# Patient Record
Sex: FEMALE | Race: WHITE | Employment: OTHER | URBAN - METROPOLITAN AREA
[De-identification: names, ages, dates, MRNs, and addresses within clinical notes are randomized per-mention and may not be internally consistent; named-entity substitution may affect disease eponyms.]

---

## 2017-06-07 PROBLEM — G45.1 TIA INVOLVING LEFT INTERNAL CAROTID ARTERY: Status: ACTIVE | Noted: 2017-06-07

## 2017-06-07 PROBLEM — R55 NEAR SYNCOPE: Status: ACTIVE | Noted: 2017-06-07

## 2019-02-12 ENCOUNTER — HOSPITAL ENCOUNTER (EMERGENCY)
Age: 44
Discharge: HOME | End: 2019-02-12
Payer: OTHER GOVERNMENT

## 2019-02-12 VITALS
RESPIRATION RATE: 16 BRPM | BODY MASS INDEX: 53.92 KG/M2 | HEART RATE: 90 BPM | WEIGHT: 293 LBS | SYSTOLIC BLOOD PRESSURE: 163 MMHG | DIASTOLIC BLOOD PRESSURE: 97 MMHG | TEMPERATURE: 99.2 F | OXYGEN SATURATION: 96 % | HEIGHT: 62 IN

## 2019-02-12 DIAGNOSIS — M23.91 INTERNAL DERANGEMENT OF RIGHT KNEE: Primary | ICD-10-CM

## 2019-02-12 DIAGNOSIS — M25.561 ACUTE PAIN OF RIGHT KNEE: ICD-10-CM

## 2019-02-12 PROCEDURE — 6370000000 HC RX 637 (ALT 250 FOR IP): Performed by: NURSE PRACTITIONER

## 2019-02-12 PROCEDURE — 99283 EMERGENCY DEPT VISIT LOW MDM: CPT

## 2019-02-12 RX ORDER — ONDANSETRON 4 MG/1
4 TABLET, ORALLY DISINTEGRATING ORAL ONCE
Status: COMPLETED | OUTPATIENT
Start: 2019-02-12 | End: 2019-02-12

## 2019-02-12 RX ORDER — HYDROCODONE BITARTRATE AND ACETAMINOPHEN 5; 325 MG/1; MG/1
1 TABLET ORAL ONCE
Status: COMPLETED | OUTPATIENT
Start: 2019-02-12 | End: 2019-02-12

## 2019-02-12 RX ADMIN — HYDROCODONE BITARTRATE AND ACETAMINOPHEN 1 TABLET: 5; 325 TABLET ORAL at 22:45

## 2019-02-12 RX ADMIN — ONDANSETRON 4 MG: 4 TABLET, ORALLY DISINTEGRATING ORAL at 22:45

## 2019-02-12 ASSESSMENT — PAIN SCALES - GENERAL
PAINLEVEL_OUTOF10: 8
PAINLEVEL_OUTOF10: 8

## 2019-10-02 ENCOUNTER — HOSPITAL ENCOUNTER (OUTPATIENT)
Age: 44
Discharge: HOME | End: 2019-10-04
Payer: OTHER GOVERNMENT

## 2019-10-02 PROCEDURE — G0123 SCREEN CERV/VAG THIN LAYER: HCPCS

## 2019-10-02 PROCEDURE — 87624 HPV HI-RISK TYP POOLED RSLT: CPT

## 2019-10-08 LAB
HPV SAMPLE: NORMAL
HPV TYPE 16: NOT DETECTED
HPV TYPE 18: NOT DETECTED
HPV, HIGH RISK OTHER: NOT DETECTED
INTERPRETATION: NORMAL
SOURCE: NORMAL

## 2020-07-24 ENCOUNTER — HOSPITAL ENCOUNTER (EMERGENCY)
Age: 45
Discharge: HOME | End: 2020-07-24
Attending: STUDENT IN AN ORGANIZED HEALTH CARE EDUCATION/TRAINING PROGRAM
Payer: OTHER GOVERNMENT

## 2020-07-24 VITALS
WEIGHT: 293 LBS | HEIGHT: 62 IN | RESPIRATION RATE: 18 BRPM | OXYGEN SATURATION: 95 % | BODY MASS INDEX: 53.92 KG/M2 | DIASTOLIC BLOOD PRESSURE: 65 MMHG | SYSTOLIC BLOOD PRESSURE: 142 MMHG | TEMPERATURE: 96.9 F | HEART RATE: 76 BPM

## 2020-07-24 PROCEDURE — 96374 THER/PROPH/DIAG INJ IV PUSH: CPT

## 2020-07-24 PROCEDURE — 6360000002 HC RX W HCPCS: Performed by: STUDENT IN AN ORGANIZED HEALTH CARE EDUCATION/TRAINING PROGRAM

## 2020-07-24 PROCEDURE — 2580000003 HC RX 258: Performed by: STUDENT IN AN ORGANIZED HEALTH CARE EDUCATION/TRAINING PROGRAM

## 2020-07-24 PROCEDURE — 96375 TX/PRO/DX INJ NEW DRUG ADDON: CPT

## 2020-07-24 PROCEDURE — 6370000000 HC RX 637 (ALT 250 FOR IP): Performed by: STUDENT IN AN ORGANIZED HEALTH CARE EDUCATION/TRAINING PROGRAM

## 2020-07-24 PROCEDURE — 99283 EMERGENCY DEPT VISIT LOW MDM: CPT

## 2020-07-24 RX ORDER — ACETAMINOPHEN 325 MG/1
650 TABLET ORAL ONCE
Status: COMPLETED | OUTPATIENT
Start: 2020-07-24 | End: 2020-07-24

## 2020-07-24 RX ORDER — METOCLOPRAMIDE 10 MG/1
10 TABLET ORAL 4 TIMES DAILY
Qty: 20 TABLET | Refills: 0 | Status: SHIPPED | OUTPATIENT
Start: 2020-07-24 | End: 2020-09-15 | Stop reason: ALTCHOICE

## 2020-07-24 RX ORDER — METHYLPREDNISOLONE SODIUM SUCCINATE 125 MG/2ML
80 INJECTION, POWDER, LYOPHILIZED, FOR SOLUTION INTRAMUSCULAR; INTRAVENOUS ONCE
Status: COMPLETED | OUTPATIENT
Start: 2020-07-24 | End: 2020-07-24

## 2020-07-24 RX ORDER — METOCLOPRAMIDE HYDROCHLORIDE 5 MG/ML
10 INJECTION INTRAMUSCULAR; INTRAVENOUS ONCE
Status: COMPLETED | OUTPATIENT
Start: 2020-07-24 | End: 2020-07-24

## 2020-07-24 RX ORDER — 0.9 % SODIUM CHLORIDE 0.9 %
1000 INTRAVENOUS SOLUTION INTRAVENOUS ONCE
Status: COMPLETED | OUTPATIENT
Start: 2020-07-24 | End: 2020-07-24

## 2020-07-24 RX ADMIN — ACETAMINOPHEN 650 MG: 325 TABLET, FILM COATED ORAL at 16:31

## 2020-07-24 RX ADMIN — METOCLOPRAMIDE HYDROCHLORIDE 10 MG: 5 INJECTION INTRAMUSCULAR; INTRAVENOUS at 16:31

## 2020-07-24 RX ADMIN — METHYLPREDNISOLONE SODIUM SUCCINATE 80 MG: 125 INJECTION, POWDER, FOR SOLUTION INTRAMUSCULAR; INTRAVENOUS at 15:42

## 2020-07-24 RX ADMIN — SODIUM CHLORIDE 1000 ML: 9 INJECTION, SOLUTION INTRAVENOUS at 15:41

## 2020-07-24 ASSESSMENT — PAIN SCALES - GENERAL: PAINLEVEL_OUTOF10: 4

## 2020-07-24 NOTE — ED PROVIDER NOTES
HPI:  7/24/20, Time: 3:28 PM EDT         Boby Holguin is a 50 y.o. female presenting to the ED for allergic reaction, beginning earlier today. The complaint has been intermittent, mild in severity, and worsened by nothing. Patient states she recently started Lyrica for fibromyalgia and back pain. This medicine was started on Wednesday. States that today she was at [facility] when she started to have a sensation of her throat closing and some shortness of breath. She became very concerned. She talked to the pharmacist and they recommended she come the emergency department for evaluation. She tried to call her doctor but they closed their office. States that symptoms are passing now that she is in the emergency department. She denies any fevers, chest pain. States that she did have an episode of emesis. She denies any nausea at this time. Patient has no abdominal pain at this time. Vitals are within normal limits. She does not have any other concerns at this time. Patient states that she is allergic to Benadryl and Pepcid. She tried to take an Allegra at the pharmacy but she threw it up. No other complaints. Patient denies fever/chills, cough, congestion, chest pain, shortness of breath, edema, headache, visual disturbances, focal paresthesias, focal weakness, abdominal pain, nausea, vomiting, diarrhea, constipation, dysuria, hematuria, trauma, neck or back pain or other complaints. ROS:   Pertinent positives and negatives are stated within HPI, all other systems reviewed and are negative.      --------------------------------------------- PAST HISTORY ---------------------------------------------  Past Medical History:  has a past medical history of Asthma, Cervical stenosis (uterine cervix), Depression, Fibromyalgia, High cholesterol, Irritable bowel, Nausea & vomiting, Prediabetes, and Spinal stenosis.     Past Surgical History:  has a past surgical history that includes Hysterectomy; laparoscopy; Dilatation, esophagus; and Colonoscopy. Social History:  reports that she has never smoked. She has never used smokeless tobacco. She reports that she does not drink alcohol or use drugs. Family History: family history includes Diabetes in her father; Heart Disease in her mother; High Blood Pressure in her mother. The patients home medications have been reviewed. Allergies: Aspirin; Benadryl [diphenhydramine hcl]; Codeine; Demerol; Famotidine; and Phenergan [promethazine hcl]        ---------------------------------------------------PHYSICAL EXAM--------------------------------------    Constitutional:  Well developed, well nourished, no acute distress, non-toxic appearance   Eyes:  PERRL, conjunctiva normal, EOMI  HENT:  Atraumatic, external ears normal, nose normal, oropharynx moist. Neck- normal range of motion, no tenderness, supple   Respiratory:  No respiratory distress, normal breath sounds, no rales, no wheezing   Cardiovascular:  Normal rate, normal rhythm, no murmurs, no gallops, no rubs. Radial and DP pulses 2+ bilaterally. GI:  Soft, nondistended, normal bowel sounds, nontender, no organomegaly, no mass, no rebound, no guarding   :  No costovertebral angle tenderness   Musculoskeletal:  No edema, no tenderness, no deformities. Back- no tenderness  Integument:  Well hydrated, no rash. Adequate perfusion. Lymphatic:  No lymphadenopathy noted   Neurologic:  Alert & oriented x 3, CN 2-12 normal, normal motor function, normal sensory function, no focal deficits noted. Psychiatric:  Speech and behavior appropriate       -------------------------------------------------- RESULTS -------------------------------------------------  I have personally reviewed all laboratory and imaging results for this patient. Results are listed below. LABS:  No results found for this visit on 07/24/20.     RADIOLOGY:  Interpreted by Radiologist.  No orders to display ------------------------- NURSING NOTES AND VITALS REVIEWED ---------------------------   The nursing notes within the ED encounter and vital signs as below have been reviewed by myself. BP (!) 142/65   Pulse 76   Temp 96.9 °F (36.1 °C) (Infrared)   Resp 18   Ht 5' 2\" (1.575 m)   Wt 299 lb (135.6 kg)   LMP  (LMP Unknown)   SpO2 95%   BMI 54.69 kg/m²   Oxygen Saturation Interpretation: Normal    The patients available past medical records and past encounters were reviewed. ------------------------------ ED COURSE/MEDICAL DECISION MAKING----------------------  Medications   methylPREDNISolone sodium (SOLU-MEDROL) injection 80 mg (80 mg Intravenous Given 7/24/20 1542)   0.9 % sodium chloride bolus (0 mLs Intravenous Stopped 7/24/20 1734)   metoclopramide (REGLAN) injection 10 mg (10 mg Intravenous Given 7/24/20 1631)   acetaminophen (TYLENOL) tablet 650 mg (650 mg Oral Given 7/24/20 1631)         Medical Decision Making:    Patient was observed for several hours in emergency department. She was given steroids. Patient was unable to have any other antiallergy medications due to her allergies. Did not give her Benadryl or Pepcid. Patient did have some nausea while in the department. She was given some fluids as well as Reglan for her symptoms. She states that she had no further concerns for shortness of breath or that her throat was bothering her. States that she felt back to 100% and normal.  Provided her prescription for continued steroid use. Instructed her to follow-up with her primary care physician for reassessment and to return the emergency department symptoms worsen. Also instructed her to not use the medication Lyrica until cleared by her primary care physician. Patient agreed with this plan and was discharged home.     This patient's ED course included: a personal history and physicial examination, re-evaluation prior to disposition, multiple bedside re-evaluations, IV

## 2020-09-08 ENCOUNTER — INITIAL CONSULT (OUTPATIENT)
Dept: NEUROSURGERY | Age: 45
End: 2020-09-08
Payer: OTHER GOVERNMENT

## 2020-09-08 VITALS
DIASTOLIC BLOOD PRESSURE: 93 MMHG | SYSTOLIC BLOOD PRESSURE: 143 MMHG | WEIGHT: 293 LBS | HEART RATE: 86 BPM | HEIGHT: 62 IN | BODY MASS INDEX: 53.92 KG/M2

## 2020-09-08 PROCEDURE — 99203 OFFICE O/P NEW LOW 30 MIN: CPT | Performed by: PHYSICIAN ASSISTANT

## 2020-09-08 ASSESSMENT — ENCOUNTER SYMPTOMS
GASTROINTESTINAL NEGATIVE: 1
RESPIRATORY NEGATIVE: 1
ALLERGIC/IMMUNOLOGIC NEGATIVE: 1
BACK PAIN: 1
EYES NEGATIVE: 1

## 2020-09-08 NOTE — PROGRESS NOTES
Subjective:      Patient ID: Rhonda Thao is a 50 y.o. female. Back Pain   This is a chronic problem. Episode onset: over 25 years. The problem occurs constantly. The problem has been gradually worsening since onset. The pain is present in the lumbar spine (and bilateral leg pain ). The quality of the pain is described as aching. The pain is at a severity of 10/10. The symptoms are aggravated by twisting, standing and bending. Risk factors: fibromyalgia. Treatments tried: gabapentin, motrin, PT. The treatment provided mild relief. Review of Systems   Constitutional: Negative. HENT: Negative. Eyes: Negative. Respiratory: Negative. Cardiovascular: Negative. Gastrointestinal: Negative. Endocrine: Negative. Genitourinary: Negative. Musculoskeletal: Positive for back pain. Skin: Negative. Allergic/Immunologic: Negative. Neurological: Negative. Hematological: Negative. Psychiatric/Behavioral: Negative. Objective:   Physical Exam  Constitutional:       Appearance: Normal appearance. HENT:      Head: Normocephalic and atraumatic. Nose: Nose normal.   Eyes:      Pupils: Pupils are equal, round, and reactive to light. Pulmonary:      Effort: Pulmonary effort is normal.   Abdominal:      General: There is no distension. Musculoskeletal:      Comments: Pain with ROM and palpation of the lumbar spine. Skin:     General: Skin is warm and dry. Neurological:      Mental Status: She is alert. GCS: GCS eye subscore is 4. GCS verbal subscore is 5. GCS motor subscore is 6. Sensory: Sensation is intact. Motor: Motor function is intact. Gait: Gait is intact. Deep Tendon Reflexes: Babinski sign absent on the right side. Babinski sign absent on the left side. Reflex Scores:       Tricep reflexes are 3+ on the right side and 3+ on the left side. Bicep reflexes are 3+ on the right side and 3+ on the left side.        Brachioradialis reflexes are 3+ on the right side and 3+ on the left side. Patellar reflexes are 3+ on the right side and 3+ on the left side. Achilles reflexes are 3+ on the right side and 3+ on the left side. Comments: + hoffmans sign    Psychiatric:         Mood and Affect: Mood normal.         Assessment:      50year old female with low back and bilateral leg pain for over 25 years. She does have a history of fibromyalgia. She does have a myelopathic history and exam.      Plan: We will order lumbar and cervical MRI.         JOE Curiel

## 2020-09-15 ENCOUNTER — HOSPITAL ENCOUNTER (OUTPATIENT)
Age: 45
Discharge: HOME | End: 2020-09-17
Payer: OTHER GOVERNMENT

## 2020-09-15 PROCEDURE — U0003 INFECTIOUS AGENT DETECTION BY NUCLEIC ACID (DNA OR RNA); SEVERE ACUTE RESPIRATORY SYNDROME CORONAVIRUS 2 (SARS-COV-2) (CORONAVIRUS DISEASE [COVID-19]), AMPLIFIED PROBE TECHNIQUE, MAKING USE OF HIGH THROUGHPUT TECHNOLOGIES AS DESCRIBED BY CMS-2020-01-R: HCPCS

## 2020-09-15 RX ORDER — COVID-19 ANTIGEN TEST
KIT MISCELLANEOUS
Status: ON HOLD | COMMUNITY
End: 2020-09-18 | Stop reason: HOSPADM

## 2020-09-15 RX ORDER — ALBUTEROL SULFATE 90 UG/1
2 AEROSOL, METERED RESPIRATORY (INHALATION) EVERY 6 HOURS PRN
COMMUNITY
End: 2022-06-23

## 2020-09-15 RX ORDER — DOCUSATE SODIUM 100 MG/1
100 CAPSULE, LIQUID FILLED ORAL DAILY
COMMUNITY

## 2020-09-15 RX ORDER — M-VIT,TX,IRON,MINS/CALC/FOLIC 27MG-0.4MG
1 TABLET ORAL DAILY
COMMUNITY
End: 2022-06-23

## 2020-09-15 NOTE — PROGRESS NOTES
Have you been tested for COVID  Yes    Tested on 9-15-20 for OR scheduled 9-18-20        Have you been told you were positive for COVID No  Have you had any known exposure to someone that is positive for COVID No  Do you have a cough                   No              Do you have shortness of breath No                 Do you have a sore throat            No                Are you having chills                    No                Are you having muscle aches. No                    Please come to the hospital wearing a mask and have your significant other wear a mask as well. Both of you should check your temperature before leaving to come here,  if it is 100 or higher please call [phone] for instruction. [facility] PRE-ADMISSION TESTING INSTRUCTIONS    The Preadmission Testing patient is instructed accordingly using the following criteria (check applicable):    ARRIVAL INSTRUCTIONS:  [x] Parking the day of Surgery is located in the Main Entrance lot. Upon entering the door, make an immediate right to the surgery reception desk    [] [id number] is available Monday through Friday 6 am to 6 pm    [x] Bring photo ID and insurance card    [] Bring in a copy of Living will or Durable Power of  papers.     [x] Please be sure to arrange for responsible adult to provide transportation to and from the hospital    [x] Please arrange for responsible adult to be with you for the 24 hour period post procedure due to having anesthesia      GENERAL INSTRUCTIONS:    [x] Nothing by mouth after midnight, including gum, candy, mints or water    [x] You may brush your teeth, but do not swallow any water    [x] Take medications as instructed with 1-2 oz of water    [x] Stop herbal supplements and vitamins 5 days prior to procedure    [x] Follow preop dosing of blood thinners per physician instructions    [] Take 1/2 dose of evening insulin, but no insulin after midnight    [] No oral diabetic medications after midnight    [] If diabetic and have low blood sugar or feel symptomatic, take 1-2oz apple juice only    [] Bring inhalers day of surgery    [] Bring C-PAP/ Bi-Pap day of surgery    [] Bring urine specimen day of surgery    [x] Shower or bath with soap, lather and rinse well, AM of Surgery, no lotion, powders or creams to surgical site    [] Follow bowel prep as instructed per surgeon    [x] No tobacco products within 24 hours of surgery     [x] No alcohol or illegal drug use within 24 hours of surgery.     [x] Jewelry, body piercing's, eyeglasses, contact lenses and dentures are not permitted into surgery (bring cases)      [x] Please do not wear any nail polish, make up or hair products on the day of surgery    [x] If not already done, you can expect a call from registration    [x] You can expect a call the business day prior to procedure to notify you if your arrival time changes    [x] If you receive a survey after surgery we would greatly appreciate your comments    [] Parent/guardian of a minor must accompany their child and remain on the premises  the entire time they are under our care     [] Pediatric patients may bring favorite toy, blanket or comfort item with them    [] A caregiver or family member must remain with the patient during their stay if they are mentally handicapped, have dementia, disoriented or unable to use a call light or would be a safety concern if left unattended    [x] Please notify surgeon if you develop any illness between now and time of surgery (cold, cough, sore throat, fever, nausea, vomiting) or any signs of infections  including skin, wounds, and dental.    [x]  The Outpatient Pharmacy is available to fill your prescription here on your day of surgery, ask your preop nurse for details    [x] Other instructions  EDUCATIONAL MATERIALS PROVIDED:    [] PAT Preoperative Education Packet/Booklet     [] Medication List    [] Fluoroscopy

## 2020-09-17 LAB
SARS-COV-2: NOT DETECTED
SOURCE: NORMAL

## 2020-09-18 ENCOUNTER — ANESTHESIA EVENT (OUTPATIENT)
Dept: OPERATING ROOM | Age: 45
End: 2020-09-18
Payer: OTHER GOVERNMENT

## 2020-09-18 ENCOUNTER — HOSPITAL ENCOUNTER (OUTPATIENT)
Age: 45
Setting detail: OUTPATIENT SURGERY
Discharge: HOME | End: 2020-09-18
Attending: OBSTETRICS & GYNECOLOGY | Admitting: OBSTETRICS & GYNECOLOGY
Payer: OTHER GOVERNMENT

## 2020-09-18 ENCOUNTER — ANESTHESIA (OUTPATIENT)
Dept: OPERATING ROOM | Age: 45
End: 2020-09-18
Payer: OTHER GOVERNMENT

## 2020-09-18 VITALS
RESPIRATION RATE: 6 BRPM | OXYGEN SATURATION: 95 % | DIASTOLIC BLOOD PRESSURE: 87 MMHG | SYSTOLIC BLOOD PRESSURE: 195 MMHG | TEMPERATURE: 73.2 F

## 2020-09-18 VITALS
TEMPERATURE: 98.2 F | OXYGEN SATURATION: 94 % | SYSTOLIC BLOOD PRESSURE: 135 MMHG | HEIGHT: 62 IN | RESPIRATION RATE: 14 BRPM | HEART RATE: 76 BPM | WEIGHT: 293 LBS | BODY MASS INDEX: 53.92 KG/M2 | DIASTOLIC BLOOD PRESSURE: 78 MMHG

## 2020-09-18 PROCEDURE — 2500000003 HC RX 250 WO HCPCS: Performed by: NURSE ANESTHETIST, CERTIFIED REGISTERED

## 2020-09-18 PROCEDURE — 2580000003 HC RX 258: Performed by: OBSTETRICS & GYNECOLOGY

## 2020-09-18 PROCEDURE — 7100000001 HC PACU RECOVERY - ADDTL 15 MIN: Performed by: OBSTETRICS & GYNECOLOGY

## 2020-09-18 PROCEDURE — 6360000002 HC RX W HCPCS: Performed by: NURSE ANESTHETIST, CERTIFIED REGISTERED

## 2020-09-18 PROCEDURE — 6360000002 HC RX W HCPCS: Performed by: OBSTETRICS & GYNECOLOGY

## 2020-09-18 PROCEDURE — 3700000001 HC ADD 15 MINUTES (ANESTHESIA): Performed by: OBSTETRICS & GYNECOLOGY

## 2020-09-18 PROCEDURE — 7100000000 HC PACU RECOVERY - FIRST 15 MIN: Performed by: OBSTETRICS & GYNECOLOGY

## 2020-09-18 PROCEDURE — 7100000010 HC PHASE II RECOVERY - FIRST 15 MIN: Performed by: OBSTETRICS & GYNECOLOGY

## 2020-09-18 PROCEDURE — 3600000002 HC SURGERY LEVEL 2 BASE: Performed by: OBSTETRICS & GYNECOLOGY

## 2020-09-18 PROCEDURE — 3600000012 HC SURGERY LEVEL 2 ADDTL 15MIN: Performed by: OBSTETRICS & GYNECOLOGY

## 2020-09-18 PROCEDURE — 7100000011 HC PHASE II RECOVERY - ADDTL 15 MIN: Performed by: OBSTETRICS & GYNECOLOGY

## 2020-09-18 PROCEDURE — 2580000003 HC RX 258: Performed by: NURSE ANESTHETIST, CERTIFIED REGISTERED

## 2020-09-18 PROCEDURE — 3700000000 HC ANESTHESIA ATTENDED CARE: Performed by: OBSTETRICS & GYNECOLOGY

## 2020-09-18 PROCEDURE — 2709999900 HC NON-CHARGEABLE SUPPLY: Performed by: OBSTETRICS & GYNECOLOGY

## 2020-09-18 RX ORDER — SODIUM CHLORIDE 0.9 % (FLUSH) 0.9 %
SYRINGE (ML) INJECTION PRN
Status: DISCONTINUED | OUTPATIENT
Start: 2020-09-18 | End: 2020-09-18 | Stop reason: ALTCHOICE

## 2020-09-18 RX ORDER — PROMETHAZINE HYDROCHLORIDE 25 MG/ML
6.25 INJECTION, SOLUTION INTRAMUSCULAR; INTRAVENOUS
Status: DISCONTINUED | OUTPATIENT
Start: 2020-09-18 | End: 2020-09-18 | Stop reason: HOSPADM

## 2020-09-18 RX ORDER — FENTANYL CITRATE 50 UG/ML
50 INJECTION, SOLUTION INTRAMUSCULAR; INTRAVENOUS EVERY 5 MIN PRN
Status: DISCONTINUED | OUTPATIENT
Start: 2020-09-18 | End: 2020-09-18 | Stop reason: HOSPADM

## 2020-09-18 RX ORDER — FENTANYL CITRATE 50 UG/ML
INJECTION, SOLUTION INTRAMUSCULAR; INTRAVENOUS PRN
Status: DISCONTINUED | OUTPATIENT
Start: 2020-09-18 | End: 2020-09-18 | Stop reason: SDUPTHER

## 2020-09-18 RX ORDER — SODIUM CHLORIDE, SODIUM LACTATE, POTASSIUM CHLORIDE, CALCIUM CHLORIDE 600; 310; 30; 20 MG/100ML; MG/100ML; MG/100ML; MG/100ML
INJECTION, SOLUTION INTRAVENOUS CONTINUOUS PRN
Status: DISCONTINUED | OUTPATIENT
Start: 2020-09-18 | End: 2020-09-18 | Stop reason: SDUPTHER

## 2020-09-18 RX ORDER — ONDANSETRON 2 MG/ML
INJECTION INTRAMUSCULAR; INTRAVENOUS PRN
Status: DISCONTINUED | OUTPATIENT
Start: 2020-09-18 | End: 2020-09-18 | Stop reason: SDUPTHER

## 2020-09-18 RX ORDER — DEXAMETHASONE SODIUM PHOSPHATE 4 MG/ML
INJECTION, SOLUTION INTRA-ARTICULAR; INTRALESIONAL; INTRAMUSCULAR; INTRAVENOUS; SOFT TISSUE PRN
Status: DISCONTINUED | OUTPATIENT
Start: 2020-09-18 | End: 2020-09-18 | Stop reason: SDUPTHER

## 2020-09-18 RX ORDER — LABETALOL HYDROCHLORIDE 5 MG/ML
INJECTION, SOLUTION INTRAVENOUS PRN
Status: DISCONTINUED | OUTPATIENT
Start: 2020-09-18 | End: 2020-09-18 | Stop reason: SDUPTHER

## 2020-09-18 RX ORDER — SODIUM CHLORIDE, SODIUM LACTATE, POTASSIUM CHLORIDE, CALCIUM CHLORIDE 600; 310; 30; 20 MG/100ML; MG/100ML; MG/100ML; MG/100ML
INJECTION, SOLUTION INTRAVENOUS CONTINUOUS
Status: DISCONTINUED | OUTPATIENT
Start: 2020-09-18 | End: 2020-09-18 | Stop reason: HOSPADM

## 2020-09-18 RX ORDER — ICOSAPENT ETHYL 1000 MG/1
1 CAPSULE ORAL DAILY
Status: ON HOLD | COMMUNITY
End: 2021-04-28 | Stop reason: HOSPADM

## 2020-09-18 RX ORDER — ROCURONIUM BROMIDE 10 MG/ML
INJECTION, SOLUTION INTRAVENOUS PRN
Status: DISCONTINUED | OUTPATIENT
Start: 2020-09-18 | End: 2020-09-18 | Stop reason: SDUPTHER

## 2020-09-18 RX ORDER — LIDOCAINE HYDROCHLORIDE 20 MG/ML
INJECTION, SOLUTION EPIDURAL; INFILTRATION; INTRACAUDAL; PERINEURAL PRN
Status: DISCONTINUED | OUTPATIENT
Start: 2020-09-18 | End: 2020-09-18 | Stop reason: SDUPTHER

## 2020-09-18 RX ORDER — MIDAZOLAM HYDROCHLORIDE 1 MG/ML
INJECTION INTRAMUSCULAR; INTRAVENOUS PRN
Status: DISCONTINUED | OUTPATIENT
Start: 2020-09-18 | End: 2020-09-18 | Stop reason: SDUPTHER

## 2020-09-18 RX ORDER — SUCCINYLCHOLINE/SOD CL,ISO/PF 200MG/10ML
SYRINGE (ML) INTRAVENOUS PRN
Status: DISCONTINUED | OUTPATIENT
Start: 2020-09-18 | End: 2020-09-18 | Stop reason: SDUPTHER

## 2020-09-18 RX ORDER — SODIUM CHLORIDE 0.9 % (FLUSH) 0.9 %
10 SYRINGE (ML) INJECTION EVERY 12 HOURS SCHEDULED
Status: DISCONTINUED | OUTPATIENT
Start: 2020-09-18 | End: 2020-09-18 | Stop reason: HOSPADM

## 2020-09-18 RX ORDER — PROPOFOL 10 MG/ML
INJECTION, EMULSION INTRAVENOUS PRN
Status: DISCONTINUED | OUTPATIENT
Start: 2020-09-18 | End: 2020-09-18 | Stop reason: SDUPTHER

## 2020-09-18 RX ORDER — SODIUM CHLORIDE 0.9 % (FLUSH) 0.9 %
10 SYRINGE (ML) INJECTION PRN
Status: DISCONTINUED | OUTPATIENT
Start: 2020-09-18 | End: 2020-09-18 | Stop reason: HOSPADM

## 2020-09-18 RX ADMIN — SODIUM CHLORIDE, POTASSIUM CHLORIDE, SODIUM LACTATE AND CALCIUM CHLORIDE: 600; 310; 30; 20 INJECTION, SOLUTION INTRAVENOUS at 12:06

## 2020-09-18 RX ADMIN — PROPOFOL 200 MG: 10 INJECTION, EMULSION INTRAVENOUS at 12:14

## 2020-09-18 RX ADMIN — ROCURONIUM BROMIDE 30 MG: 10 INJECTION, SOLUTION INTRAVENOUS at 12:19

## 2020-09-18 RX ADMIN — Medication 160 MG: at 12:14

## 2020-09-18 RX ADMIN — CEFAZOLIN 3 G: 10 INJECTION, POWDER, FOR SOLUTION INTRAVENOUS at 12:11

## 2020-09-18 RX ADMIN — MIDAZOLAM 2 MG: 1 INJECTION INTRAMUSCULAR; INTRAVENOUS at 12:08

## 2020-09-18 RX ADMIN — LIDOCAINE HYDROCHLORIDE 80 MG: 20 INJECTION, SOLUTION EPIDURAL; INFILTRATION; INTRACAUDAL; PERINEURAL at 12:14

## 2020-09-18 RX ADMIN — FENTANYL CITRATE 50 MCG: 50 INJECTION, SOLUTION INTRAMUSCULAR; INTRAVENOUS at 12:14

## 2020-09-18 RX ADMIN — DEXAMETHASONE SODIUM PHOSPHATE 10 MG: 4 INJECTION, SOLUTION INTRAMUSCULAR; INTRAVENOUS at 12:19

## 2020-09-18 RX ADMIN — SODIUM CHLORIDE, POTASSIUM CHLORIDE, SODIUM LACTATE AND CALCIUM CHLORIDE: 600; 310; 30; 20 INJECTION, SOLUTION INTRAVENOUS at 10:48

## 2020-09-18 RX ADMIN — ONDANSETRON 4 MG: 2 INJECTION INTRAMUSCULAR; INTRAVENOUS at 12:19

## 2020-09-18 RX ADMIN — SUGAMMADEX 500 MG: 100 INJECTION, SOLUTION INTRAVENOUS at 12:37

## 2020-09-18 RX ADMIN — LABETALOL HYDROCHLORIDE 5 MG: 5 INJECTION INTRAVENOUS at 12:33

## 2020-09-18 ASSESSMENT — PULMONARY FUNCTION TESTS
PIF_VALUE: 40
PIF_VALUE: 15
PIF_VALUE: 29
PIF_VALUE: 3
PIF_VALUE: 27
PIF_VALUE: 19
PIF_VALUE: 3
PIF_VALUE: 28
PIF_VALUE: 31
PIF_VALUE: 38
PIF_VALUE: 18
PIF_VALUE: 27
PIF_VALUE: 30
PIF_VALUE: 39
PIF_VALUE: 18
PIF_VALUE: 18
PIF_VALUE: 26
PIF_VALUE: 28
PIF_VALUE: 38
PIF_VALUE: 36
PIF_VALUE: 0
PIF_VALUE: 5
PIF_VALUE: 5
PIF_VALUE: 2
PIF_VALUE: 27
PIF_VALUE: 5
PIF_VALUE: 8
PIF_VALUE: 41
PIF_VALUE: 27
PIF_VALUE: 1
PIF_VALUE: 28
PIF_VALUE: 27
PIF_VALUE: 27
PIF_VALUE: 2

## 2020-09-18 ASSESSMENT — PAIN SCALES - GENERAL
PAINLEVEL_OUTOF10: 0

## 2020-09-18 ASSESSMENT — LIFESTYLE VARIABLES: SMOKING_STATUS: 0

## 2020-09-18 ASSESSMENT — ENCOUNTER SYMPTOMS: SHORTNESS OF BREATH: 0

## 2020-09-18 ASSESSMENT — PAIN - FUNCTIONAL ASSESSMENT: PAIN_FUNCTIONAL_ASSESSMENT: 0-10

## 2020-09-18 ASSESSMENT — PAIN DESCRIPTION - DESCRIPTORS: DESCRIPTORS: ACHING

## 2020-09-18 NOTE — H&P
Attends Anabaptist service: Not on file     Active member of club or organization: Not on file     Attends meetings of clubs or organizations: Not on file     Relationship status: Not on file    Intimate partner violence     Fear of current or ex partner: Not on file     Emotionally abused: Not on file     Physically abused: Not on file     Forced sexual activity: Not on file   Other Topics Concern    Not on file   Social History Narrative    Not on file     Family History:       Problem Relation Age of Onset    Heart Disease Mother     High Blood Pressure Mother     Diabetes Father      Medications Prior to Admission:  No medications prior to admission. REVIEW OF SYSTEMS:    CONSTITUTIONAL:  negative  RESPIRATORY:  negative  CARDIOVASCULAR:  negative  GASTROINTESTINAL:  negative  ALLERGIC/IMMUNOLOGIC:  negative  NEUROLOGICAL:  negative  BEHAVIOR/PSYCH:  negative       PHYSICAL EXAM:  Vitals:    09/15/20 1213   Weight: (!) 320 lb (145.2 kg)   Height: 5' 2\" (1.575 m)      Neuro:  Awake, alert, oriented to name, place and time. HEENT: NC/AT, no thyromegaly  Cardiac: RRR  Lungs:  CTA b/l  Abdomen:  Soft, non tender    Pelvic: No prolapse     ASSESSMENT:   Refractory over active bladder  Small bladder capacity  Urinary urge incontinence    PLAN:  Cystoscopy with bladder distention  Intra-detrusor Botox injections  Electronically signed by Neeraj Zhong MD on 9/18/2020 at 8:16 AM     Update History & Physical    The patient's History and Physical was reviewed with the patient and there were no significant changes. I examined the patient and there were no significant changes from the previous History and Physical.    Plan: The risk, benefits, expected outcome, and alternative to the recommended procedure have been discussed with the patient. Patient understands and wants to proceed with the procedure.     Electronically signed by Neeraj Zhong MD  on 9/18/2020 at 11:07 AM

## 2020-09-18 NOTE — ANESTHESIA PRE PROCEDURE
Department of Anesthesiology  Preprocedure Note       Name:  Michael Mas   Age:  50 y.o.  :  1975                                          MRN:  [id number]         Date:  2020      Surgeon: Hari Argueta): David Hashimoto, MD    Procedure: Procedure(s):  CYSTOSCOPY WITH BOTOX INJECTION OF BLADDER 100 UNITS, BLADDER DILATION  (PHARMACY NOTIFIED)++LATEX ALLERGY++    Medications prior to admission:   Prior to Admission medications    Medication Sig Start Date End Date Taking?  Authorizing Provider   Icosapent Ethyl (VASCEPA) 1 g CAPS capsule Take 1 capsule by mouth daily   Yes Historical Provider, MD   Multiple Vitamins-Minerals (THERAPEUTIC MULTIVITAMIN-MINERALS) tablet Take 1 tablet by mouth daily   Yes Historical Provider, MD   Naproxen Sodium (ALEVE) 220 MG CAPS Take by mouth LD 20   Yes Historical Provider, MD   albuterol sulfate HFA (VENTOLIN HFA) 108 (90 Base) MCG/ACT inhaler Inhale 2 puffs into the lungs every 6 hours as needed for Wheezing   Yes Historical Provider, MD   mirabegron (MYRBETRIQ) 25 MG TB24 Take 25 mg by mouth daily   Yes Historical Provider, MD   docusate sodium (COLACE) 100 MG capsule Take 100 mg by mouth 2 times daily   Yes Historical Provider, MD   ibuprofen (ADVIL;MOTRIN) 800 MG tablet Take 1 tablet by mouth every 8 hours as needed for Pain 17  Yes Greg Powell MD   DULoxetine (CYMBALTA) 30 MG extended release capsule Take 1 capsule by mouth daily  Patient taking differently: Take 90 mg by mouth daily  17  Yes Greg Powell MD   Cholecalciferol (VITAMIN D) 2000 UNITS CAPS capsule Take by mouth   Yes Historical Provider, MD   Levothyroxine Sodium (SYNTHROID PO) Take 0.05 mg by mouth daily Unsure of dose   Yes Historical Provider, MD   montelukast (SINGULAIR) 10 MG tablet Take 10 mg by mouth daily    Yes Historical Provider, MD       Current medications:    Current Facility-Administered Medications   Medication Dose Route Frequency Provider Last Rate Last Dose    Use    Smoking status: Never Smoker    Smokeless tobacco: Never Used   Substance Use Topics    Alcohol use: No                                Counseling given: Not Answered      Vital Signs (Current):   Vitals:    09/15/20 1213 09/18/20 1016   BP:  (!) 174/92   Pulse:  75   Resp:  20   Temp:  96 °F (35.6 °C)   TempSrc:  Temporal   SpO2:  98%   Weight: (!) 320 lb (145.2 kg) (!) 320 lb (145.2 kg)   Height: 5' 2\" (1.575 m) 5' 2\" (1.575 m)                                              BP Readings from Last 3 Encounters:   09/18/20 (!) 174/92   09/08/20 (!) 143/93   07/24/20 (!) 142/65       NPO Status:                                                                                 BMI:   Wt Readings from Last 3 Encounters:   09/18/20 (!) 320 lb (145.2 kg)   09/08/20 (!) 312 lb (141.5 kg)   07/24/20 299 lb (135.6 kg)     Body mass index is 58.53 kg/m². CBC:   Lab Results   Component Value Date    WBC 8.8 12/07/2017    RBC 4.14 12/07/2017    HGB 13.0 12/07/2017    HCT 39.3 12/07/2017    MCV 94.9 12/07/2017    RDW 14.0 12/07/2017     12/07/2017       CMP:   Lab Results   Component Value Date     12/07/2017    K 3.8 12/07/2017     12/07/2017    CO2 25 12/07/2017    BUN 17 12/07/2017    CREATININE 0.8 12/07/2017    GFRAA >60 12/07/2017    LABGLOM >60 12/07/2017    GLUCOSE 103 12/07/2017    GLUCOSE 116 10/21/2011    PROT 7.6 12/07/2017    CALCIUM 9.7 12/07/2017    BILITOT 0.3 12/07/2017    ALKPHOS 77 12/07/2017    AST 18 12/07/2017    ALT 20 12/07/2017       POC Tests: No results for input(s): POCGLU, POCNA, POCK, POCCL, POCBUN, POCHEMO, POCHCT in the last 72 hours. Coags:   Lab Results   Component Value Date    PROTIME 11.2 05/12/2015    INR 1.0 05/12/2015    APTT 27.4 05/12/2015       HCG (If Applicable):   Lab Results   Component Value Date    PREGTESTUR NEGATIVE 06/08/2017        ABGs: No results found for: PHART, PO2ART, YCN4IPU, EEL9RGV, BEART, H3XCTTJY     Type & Screen (If Applicable):   No results found for: Shaina Tran    Drug/Infectious Status (If Applicable):  No results found for: HIV, HEPCAB    COVID-19 Screening (If Applicable):   Lab Results   Component Value Date    COVID19 Not Detected 09/15/2020         Anesthesia Evaluation  Patient summary reviewed and Nursing notes reviewed   history of anesthetic complications: PONV. Airway: Mallampati: III  TM distance: >3 FB   Neck ROM: full  Mouth opening: > = 3 FB Dental: normal exam         Pulmonary:   (+) sleep apnea:  decreased breath sounds,  asthma:     (-) shortness of breath and not a current smoker                           Cardiovascular:  Exercise tolerance: poor (<4 METS),   (+) valvular problems/murmurs: MVP,         Rhythm: regular  Rate: normal           Beta Blocker:  Not on Beta Blocker         Neuro/Psych:   (+) neuromuscular disease:, TIA, psychiatric history:            GI/Hepatic/Renal:   (+) morbid obesity          Endo/Other:    (+) hypothyroidism: arthritis:., .                 Abdominal:           Vascular: negative vascular ROS. Anesthesia Plan      general     ASA 3       Induction: intravenous. Anesthetic plan and risks discussed with patient and spouse.                     Julio Cesar Grant MD   9/18/2020

## 2020-09-18 NOTE — ANESTHESIA POSTPROCEDURE EVALUATION
Department of Anesthesiology  Postprocedure Note    Patient: Villa Damon  MRN: [id number]  YOB: 1975  Date of evaluation: 9/18/2020  Time:  3:42 PM     Procedure Summary     Date:  09/18/20 Room / Location:  [facility] 09 / [facility]    Anesthesia Start:  1209 Anesthesia Stop:  8546    Procedure:  CYSTOSCOPY WITH BOTOX INJECTION OF BLADDER 100 UNITS, BLADDER DILATION (N/A Bladder) Diagnosis:  (URINARY URGE [address], SMALL BLADDER CAPACITY)    Surgeon:  Neeraj Zhong MD Responsible Provider:  Jaden Maloney MD    Anesthesia Type:  general ASA Status:  3          Anesthesia Type: general    Sosa Phase I: Sosa Score: 10    Sosa Phase II: Sosa Score: 10    Last vitals: Reviewed and per EMR flowsheets.        Anesthesia Post Evaluation    Patient location during evaluation: PACU  Patient participation: complete - patient participated  Level of consciousness: awake and alert  Airway patency: patent  Nausea & Vomiting: no vomiting and no nausea  Complications: no  Cardiovascular status: blood pressure returned to baseline  Respiratory status: acceptable  Hydration status: euvolemic

## 2020-09-18 NOTE — OP NOTE
Operative Note      Patient: Ellen Stewart  YOB: 1975  MRN: [id number]    Date of Procedure: 9/18/2020    Pre-Op Diagnosis: URINARY URGE INCONTINENCE  DECREASED BLADDER CAPACITY    Post-Op Diagnosis: Same       Procedure(s):  CYSTOSCOPY WITH BOTOX INJECTION OF BLADDER 100 UNITS, BLADDER DILATION  (PHARMACY NOTIFIED)++LATEX ALLERGY++    Surgeon(s):   Rajendra Bates MD    Anesthesia: General    Estimated Blood Loss (mL): NONE    Complications: None      Drains: NONE    Findings: Normal bladder trabeculae    Condition:  Stable    Detailed Description of Procedure:   [id number]    Electronically signed by Rajendra Bates MD on 9/18/2020 at 11:07 AM

## 2020-09-18 NOTE — OP NOTE
[address] [address]                                OPERATIVE REPORT    PATIENT NAME: Keely Glynn                   :        1975  MED REC NO:   [id number]                            ROOM:  ACCOUNT NO:   [de-identified]                           ADMIT DATE: 2020  PROVIDER:     Gaurang Sharp MD    DATE OF PROCEDURE:  2020    PREOPERATIVE DIAGNOSES:  1. Urinary urge incontinence. 2.  Decreased bladder capacity. POSTOPERATIVE DIAGNOSES:  1. Urinary urge incontinence. 2.  Decreased bladder capacity. PROCEDURE PERFORMED:  Cystoscopy with Botox injection in the bladder and  bladder dilation. SURGEON:  Gaurang Sharp MD.    ANESTHESIA:  General.    FLUIDS:  IV crystalloid. COMPLICATIONS:  None. ESTIMATED BLOOD LOSS:  None. DRAINS:  None. FINDINGS:  Normal bladder trabeculae. CONDITION:  Stable. DESCRIPTION OF PROCEDURE:  The patient was brought to the main OR. She  was then prepped and draped in the usual fashion in dorsal lithotomy  position. A 30-degree hysteroscope was assembled and inserted. Visualization of the bladder revealed normal bladder mucosa. The  bladder was distended to 300 mL. Injection needle was introduced with  100 units of Botox. These were injected in four rows of 0.5 mL aliquots  making a submucosal bleb. The bladder was then drained. The patient  was transferred to Recovery having tolerated the procedure in stable  condition.         Benito Rea MD    D: 2020 12:47:13       T: 2020 14:00:53     [id number]  Job#: [id number]     Doc#: [id number]    CC:

## 2020-09-29 ENCOUNTER — HOSPITAL ENCOUNTER (OUTPATIENT)
Age: 45
Discharge: HOME | End: 2020-10-01
Payer: OTHER GOVERNMENT

## 2020-09-29 LAB
BACTERIA: ABNORMAL /HPF
BILIRUBIN URINE: NEGATIVE
BLOOD, URINE: ABNORMAL
CLARITY: ABNORMAL
COLOR: YELLOW
EPITHELIAL CELLS, UA: ABNORMAL /HPF
GLUCOSE URINE: NEGATIVE MG/DL
KETONES, URINE: NEGATIVE MG/DL
LEUKOCYTE ESTERASE, URINE: ABNORMAL
NITRITE, URINE: POSITIVE
PH UA: 6 (ref 5–9)
PROTEIN UA: NEGATIVE MG/DL
RBC UA: ABNORMAL /HPF (ref 0–2)
SPECIFIC GRAVITY UA: 1.02 (ref 1–1.03)
UROBILINOGEN, URINE: 0.2 E.U./DL
WBC UA: >20 /HPF (ref 0–5)

## 2020-09-29 PROCEDURE — 81001 URINALYSIS AUTO W/SCOPE: CPT

## 2020-09-29 PROCEDURE — 87088 URINE BACTERIA CULTURE: CPT

## 2020-09-29 PROCEDURE — 87186 SC STD MICRODIL/AGAR DIL: CPT

## 2020-10-01 LAB
ORGANISM: ABNORMAL
URINE CULTURE, ROUTINE: ABNORMAL

## 2021-03-26 ENCOUNTER — IMMUNIZATION (OUTPATIENT)
Dept: PRIMARY CARE CLINIC | Age: 46
End: 2021-03-26
Payer: OTHER GOVERNMENT

## 2021-03-26 PROCEDURE — 0001A COVID-19, PFIZER VACCINE 30MCG/0.3ML DOSE: CPT | Performed by: PHYSICIAN ASSISTANT

## 2021-03-26 PROCEDURE — 91300 COVID-19, PFIZER VACCINE 30MCG/0.3ML DOSE: CPT | Performed by: PHYSICIAN ASSISTANT

## 2021-04-09 ENCOUNTER — HOSPITAL ENCOUNTER (EMERGENCY)
Age: 46
Discharge: HOME | End: 2021-04-10
Attending: EMERGENCY MEDICINE
Payer: OTHER GOVERNMENT

## 2021-04-09 ENCOUNTER — APPOINTMENT (OUTPATIENT)
Dept: GENERAL RADIOLOGY | Age: 46
End: 2021-04-09
Payer: OTHER GOVERNMENT

## 2021-04-09 VITALS
SYSTOLIC BLOOD PRESSURE: 146 MMHG | HEIGHT: 63 IN | HEART RATE: 90 BPM | OXYGEN SATURATION: 97 % | WEIGHT: 293 LBS | DIASTOLIC BLOOD PRESSURE: 90 MMHG | TEMPERATURE: 97.1 F | BODY MASS INDEX: 51.91 KG/M2 | RESPIRATION RATE: 16 BRPM

## 2021-04-09 DIAGNOSIS — S80.12XA CONTUSION OF LEFT LOWER EXTREMITY, INITIAL ENCOUNTER: Primary | ICD-10-CM

## 2021-04-09 DIAGNOSIS — L03.90 CELLULITIS, UNSPECIFIED CELLULITIS SITE: ICD-10-CM

## 2021-04-09 LAB — D DIMER: 304 NG/ML DDU

## 2021-04-09 PROCEDURE — 6360000002 HC RX W HCPCS

## 2021-04-09 PROCEDURE — 6370000000 HC RX 637 (ALT 250 FOR IP): Performed by: EMERGENCY MEDICINE

## 2021-04-09 PROCEDURE — 73590 X-RAY EXAM OF LOWER LEG: CPT

## 2021-04-09 PROCEDURE — 99284 EMERGENCY DEPT VISIT MOD MDM: CPT

## 2021-04-09 PROCEDURE — 36415 COLL VENOUS BLD VENIPUNCTURE: CPT

## 2021-04-09 PROCEDURE — 96372 THER/PROPH/DIAG INJ SC/IM: CPT

## 2021-04-09 PROCEDURE — 85378 FIBRIN DEGRADE SEMIQUANT: CPT

## 2021-04-09 RX ORDER — TRAMADOL HYDROCHLORIDE 50 MG/1
50 TABLET ORAL EVERY 6 HOURS PRN
Qty: 15 TABLET | Refills: 0 | Status: SHIPPED | OUTPATIENT
Start: 2021-04-09 | End: 2021-04-19 | Stop reason: HOSPADM

## 2021-04-09 RX ORDER — CEPHALEXIN 500 MG/1
500 CAPSULE ORAL 3 TIMES DAILY
Qty: 21 CAPSULE | Refills: 0 | Status: ON HOLD | OUTPATIENT
Start: 2021-04-09 | End: 2021-04-15

## 2021-04-09 RX ORDER — CEPHALEXIN 500 MG/1
500 CAPSULE ORAL ONCE
Status: COMPLETED | OUTPATIENT
Start: 2021-04-09 | End: 2021-04-09

## 2021-04-09 RX ORDER — TRAMADOL HYDROCHLORIDE 50 MG/1
100 TABLET ORAL ONCE
Status: COMPLETED | OUTPATIENT
Start: 2021-04-09 | End: 2021-04-09

## 2021-04-09 RX ORDER — BACITRACIN, NEOMYCIN, POLYMYXIN B 400; 3.5; 5 [USP'U]/G; MG/G; [USP'U]/G
OINTMENT TOPICAL
Qty: 30 G | Refills: 0 | Status: ON HOLD | OUTPATIENT
Start: 2021-04-09 | End: 2021-04-19 | Stop reason: HOSPADM

## 2021-04-09 RX ADMIN — ENOXAPARIN SODIUM 150 MG: 120 INJECTION SUBCUTANEOUS at 23:53

## 2021-04-09 RX ADMIN — ENOXAPARIN SODIUM 30 MG: 40 INJECTION SUBCUTANEOUS at 23:54

## 2021-04-09 RX ADMIN — TRAMADOL HYDROCHLORIDE 100 MG: 50 TABLET, FILM COATED ORAL at 22:52

## 2021-04-09 RX ADMIN — CEPHALEXIN 500 MG: 500 CAPSULE ORAL at 22:52

## 2021-04-09 ASSESSMENT — PAIN DESCRIPTION - PAIN TYPE: TYPE: ACUTE PAIN

## 2021-04-10 ENCOUNTER — HOSPITAL ENCOUNTER (OUTPATIENT)
Dept: ULTRASOUND IMAGING | Age: 46
Discharge: HOME | End: 2021-04-12
Payer: OTHER GOVERNMENT

## 2021-04-10 ENCOUNTER — HOSPITAL ENCOUNTER (OUTPATIENT)
Age: 46
Discharge: HOME | End: 2021-04-12
Payer: OTHER GOVERNMENT

## 2021-04-10 DIAGNOSIS — R60.9 SWELLING: ICD-10-CM

## 2021-04-10 DIAGNOSIS — R52 PAIN: ICD-10-CM

## 2021-04-10 PROCEDURE — 93971 EXTREMITY STUDY: CPT | Performed by: RADIOLOGY

## 2021-04-10 PROCEDURE — 93971 EXTREMITY STUDY: CPT

## 2021-04-10 NOTE — ED NOTES
Spoke to pharmacistHitesh at main campus regarding Lovenox. Ok per Hitesh  to give 140 mg which consists of 100mg syringe and 40mg syringe.        Tiffany Hayden RN  04/09/21 7309

## 2021-04-10 NOTE — ED PROVIDER NOTES
smoked. She has never used smokeless tobacco. She reports that she does not drink alcohol or use drugs. Family History: family history includes Diabetes in her father; Heart Disease in her mother; High Blood Pressure in her mother. The patients home medications have been reviewed. Allergies: Latex, Adhesive tape, Aspirin, Benadryl [diphenhydramine hcl], Codeine, Demerol, Famotidine, and Phenergan [promethazine hcl]    -------------------------------------------------- RESULTS -------------------------------------------------  All laboratory and radiology results have been personally reviewed by myself   LABS:  Results for orders placed or performed during the hospital encounter of 04/09/21   D-Dimer, Quantitative   Result Value Ref Range    D-Dimer, Quant 304 ng/mL DDU       RADIOLOGY:  Interpreted by Radiologist.  XR TIBIA FIBULA LEFT (2 VIEWS)   Final Result   No acute bony abnormality.             ------------------------- NURSING NOTES AND VITALS REVIEWED ---------------------------    The nursing notes within the ED encounter and vital signs as below have been reviewed. BP (!) 146/90   Pulse 90   Temp 97.1 °F (36.2 °C) (Temporal)   Resp 16   Ht 5' 3\" (1.6 m)   Wt (!) 324 lb 3.2 oz (147.1 kg)   LMP  (LMP Unknown)   SpO2 97%   BMI 57.43 kg/m²   Oxygen Saturation Interpretation: Normal      ---------------------------------------------------PHYSICAL EXAM--------------------------------------      Constitutional/General: Alert and oriented x3, well appearing, non toxic in NAD  Head: Normocephalic and atraumatic  Eyes: PERRL, EOMI  Mouth: Oropharynx clear, handling secretions, no trismus  Neck: Supple, full ROM, no stridor no dysphonia. Trachea midline  Pulmonary: Lungs clear to auscultation bilaterally, no wheezes, rales, or rhonchi. Not in respiratory distress  Cardiovascular:  Regular rate and rhythm, no murmurs, gallops, or rubs.  2+ distal pulses  Abdomen: Soft, non tender, non distended, if positive she will go to the [facility] ED to be initiated on long-term anticoagulation. Patient does not have any soft tissue gas on her x-ray nor any pain out of proportion to her exam findings to suggest necrotizing fasciitis. She does have a nonpurulent cellulitis and will be started on Keflex for treatment of this. Patient understands she is to get immediate medical attention if any new or worsening symptoms develop. Counseling: The emergency provider has spoken with the patient and discussed todays results, in addition to providing specific details for the plan of care and counseling regarding the diagnosis and prognosis. Questions are answered at this time and they are agreeable with the plan. Controlled Substance Monitoring:  Acute and Chronic Pain Monitoring:   RX Monitoring 6/14/2017   Periodic Controlled Substance Monitoring Existing medication contract       --------------------------------- IMPRESSION AND DISPOSITION ---------------------------------    IMPRESSION  1. Contusion of left lower extremity, initial encounter    2. Cellulitis, unspecified cellulitis site        DISPOSITION  Disposition: Discharge to home  Patient condition is stable      NOTE: This report was transcribed using voice recognition software.  Every effort was made to ensure accuracy; however, inadvertent computerized transcription errors may be present        Jenni Prescott MD  04/09/21 [id number]

## 2021-04-14 ENCOUNTER — HOSPITAL ENCOUNTER (INPATIENT)
Age: 46
LOS: 5 days | Discharge: HOME | DRG: 581 | End: 2021-04-19
Attending: EMERGENCY MEDICINE | Admitting: FAMILY MEDICINE
Payer: OTHER GOVERNMENT

## 2021-04-14 ENCOUNTER — APPOINTMENT (OUTPATIENT)
Dept: GENERAL RADIOLOGY | Age: 46
DRG: 581 | End: 2021-04-14
Payer: OTHER GOVERNMENT

## 2021-04-14 DIAGNOSIS — L08.9 WOUND INFECTION: ICD-10-CM

## 2021-04-14 DIAGNOSIS — T14.8XXA WOUND INFECTION: ICD-10-CM

## 2021-04-14 DIAGNOSIS — L03.116 CELLULITIS OF LEFT LOWER EXTREMITY: Primary | ICD-10-CM

## 2021-04-14 LAB
ALBUMIN SERPL-MCNC: 4.1 G/DL (ref 3.5–5.2)
ALP BLD-CCNC: 90 U/L (ref 35–104)
ALT SERPL-CCNC: 29 U/L (ref 0–32)
ANION GAP SERPL CALCULATED.3IONS-SCNC: 14 MMOL/L (ref 7–16)
ANTISTREPTOLYSIN-O: 24 IU/ML (ref 0–200)
AST SERPL-CCNC: 31 U/L (ref 0–31)
BASOPHILS ABSOLUTE: 0.05 E9/L (ref 0–0.2)
BASOPHILS RELATIVE PERCENT: 0.4 % (ref 0–2)
BILIRUB SERPL-MCNC: 0.3 MG/DL (ref 0–1.2)
BUN BLDV-MCNC: 10 MG/DL (ref 6–20)
CALCIUM SERPL-MCNC: 9.8 MG/DL (ref 8.6–10.2)
CHLORIDE BLD-SCNC: 98 MMOL/L (ref 98–107)
CO2: 29 MMOL/L (ref 22–29)
CREAT SERPL-MCNC: 1 MG/DL (ref 0.5–1)
EOSINOPHILS ABSOLUTE: 0.22 E9/L (ref 0.05–0.5)
EOSINOPHILS RELATIVE PERCENT: 1.8 % (ref 0–6)
GFR AFRICAN AMERICAN: >60
GFR NON-AFRICAN AMERICAN: 59 ML/MIN/1.73
GLUCOSE BLD-MCNC: 102 MG/DL (ref 74–99)
HCT VFR BLD CALC: 42.3 % (ref 34–48)
HEMOGLOBIN: 13.5 G/DL (ref 11.5–15.5)
IMMATURE GRANULOCYTES #: 0.06 E9/L
IMMATURE GRANULOCYTES %: 0.5 % (ref 0–5)
LACTIC ACID: 2.2 MMOL/L (ref 0.5–2.2)
LYMPHOCYTES ABSOLUTE: 3.18 E9/L (ref 1.5–4)
LYMPHOCYTES RELATIVE PERCENT: 25.8 % (ref 20–42)
MCH RBC QN AUTO: 30.3 PG (ref 26–35)
MCHC RBC AUTO-ENTMCNC: 31.9 % (ref 32–34.5)
MCV RBC AUTO: 94.8 FL (ref 80–99.9)
MONOCYTES ABSOLUTE: 0.65 E9/L (ref 0.1–0.95)
MONOCYTES RELATIVE PERCENT: 5.3 % (ref 2–12)
NEUTROPHILS ABSOLUTE: 8.17 E9/L (ref 1.8–7.3)
NEUTROPHILS RELATIVE PERCENT: 66.2 % (ref 43–80)
PDW BLD-RTO: 14.6 FL (ref 11.5–15)
PLATELET # BLD: 346 E9/L (ref 130–450)
PMV BLD AUTO: 9.8 FL (ref 7–12)
POTASSIUM SERPL-SCNC: 3.4 MMOL/L (ref 3.5–5)
RBC # BLD: 4.46 E12/L (ref 3.5–5.5)
SODIUM BLD-SCNC: 141 MMOL/L (ref 132–146)
TOTAL PROTEIN: 8 G/DL (ref 6.4–8.3)
WBC # BLD: 12.3 E9/L (ref 4.5–11.5)

## 2021-04-14 PROCEDURE — 73590 X-RAY EXAM OF LOWER LEG: CPT

## 2021-04-14 PROCEDURE — 85025 COMPLETE CBC W/AUTO DIFF WBC: CPT

## 2021-04-14 PROCEDURE — 6360000002 HC RX W HCPCS: Performed by: EMERGENCY MEDICINE

## 2021-04-14 PROCEDURE — 1200000000 HC SEMI PRIVATE

## 2021-04-14 PROCEDURE — 86060 ANTISTREPTOLYSIN O TITER: CPT

## 2021-04-14 PROCEDURE — 87040 BLOOD CULTURE FOR BACTERIA: CPT

## 2021-04-14 PROCEDURE — 83605 ASSAY OF LACTIC ACID: CPT

## 2021-04-14 PROCEDURE — 87070 CULTURE OTHR SPECIMN AEROBIC: CPT

## 2021-04-14 PROCEDURE — 85651 RBC SED RATE NONAUTOMATED: CPT

## 2021-04-14 PROCEDURE — 80053 COMPREHEN METABOLIC PANEL: CPT

## 2021-04-14 PROCEDURE — 99283 EMERGENCY DEPT VISIT LOW MDM: CPT

## 2021-04-14 PROCEDURE — 2580000003 HC RX 258: Performed by: EMERGENCY MEDICINE

## 2021-04-14 RX ADMIN — PIPERACILLIN SODIUM AND TAZOBACTAM SODIUM 4500 MG: 4; .5 INJECTION, POWDER, LYOPHILIZED, FOR SOLUTION INTRAVENOUS at 23:07

## 2021-04-14 RX ADMIN — VANCOMYCIN HYDROCHLORIDE 2000 MG: 10 INJECTION, POWDER, LYOPHILIZED, FOR SOLUTION INTRAVENOUS at 23:47

## 2021-04-14 ASSESSMENT — PAIN SCALES - GENERAL: PAINLEVEL_OUTOF10: 7

## 2021-04-14 ASSESSMENT — PAIN DESCRIPTION - ORIENTATION: ORIENTATION: LEFT

## 2021-04-14 ASSESSMENT — PAIN DESCRIPTION - PAIN TYPE: TYPE: ACUTE PAIN

## 2021-04-14 ASSESSMENT — PAIN DESCRIPTION - DESCRIPTORS: DESCRIPTORS: ACHING;BURNING;SHARP

## 2021-04-14 ASSESSMENT — PAIN DESCRIPTION - LOCATION: LOCATION: LEG

## 2021-04-15 ENCOUNTER — APPOINTMENT (OUTPATIENT)
Dept: CT IMAGING | Age: 46
DRG: 581 | End: 2021-04-15
Payer: OTHER GOVERNMENT

## 2021-04-15 LAB
ANION GAP SERPL CALCULATED.3IONS-SCNC: 12 MMOL/L (ref 7–16)
BUN BLDV-MCNC: 13 MG/DL (ref 6–20)
C-REACTIVE PROTEIN: 5.5 MG/DL (ref 0–0.4)
CALCIUM SERPL-MCNC: 9.2 MG/DL (ref 8.6–10.2)
CHLORIDE BLD-SCNC: 99 MMOL/L (ref 98–107)
CO2: 25 MMOL/L (ref 22–29)
CREAT SERPL-MCNC: 1 MG/DL (ref 0.5–1)
GFR AFRICAN AMERICAN: >60
GFR NON-AFRICAN AMERICAN: 59 ML/MIN/1.73
GLUCOSE BLD-MCNC: 103 MG/DL (ref 74–99)
HCT VFR BLD CALC: 41.1 % (ref 34–48)
HEMOGLOBIN: 13 G/DL (ref 11.5–15.5)
MCH RBC QN AUTO: 30 PG (ref 26–35)
MCHC RBC AUTO-ENTMCNC: 31.6 % (ref 32–34.5)
MCV RBC AUTO: 94.9 FL (ref 80–99.9)
PDW BLD-RTO: 14.6 FL (ref 11.5–15)
PLATELET # BLD: 324 E9/L (ref 130–450)
PMV BLD AUTO: 9.6 FL (ref 7–12)
POTASSIUM SERPL-SCNC: 3.3 MMOL/L (ref 3.5–5)
RBC # BLD: 4.33 E12/L (ref 3.5–5.5)
SEDIMENTATION RATE, ERYTHROCYTE: 40 MM/HR (ref 0–20)
SEDIMENTATION RATE, ERYTHROCYTE: 51 MM/HR (ref 0–20)
SODIUM BLD-SCNC: 136 MMOL/L (ref 132–146)
WBC # BLD: 10.5 E9/L (ref 4.5–11.5)

## 2021-04-15 PROCEDURE — 2580000003 HC RX 258: Performed by: FAMILY MEDICINE

## 2021-04-15 PROCEDURE — 99253 IP/OBS CNSLTJ NEW/EST LOW 45: CPT | Performed by: SURGERY

## 2021-04-15 PROCEDURE — 2500000003 HC RX 250 WO HCPCS

## 2021-04-15 PROCEDURE — 6360000002 HC RX W HCPCS: Performed by: FAMILY MEDICINE

## 2021-04-15 PROCEDURE — 85027 COMPLETE CBC AUTOMATED: CPT

## 2021-04-15 PROCEDURE — 1200000000 HC SEMI PRIVATE

## 2021-04-15 PROCEDURE — 86140 C-REACTIVE PROTEIN: CPT

## 2021-04-15 PROCEDURE — 87070 CULTURE OTHR SPECIMN AEROBIC: CPT

## 2021-04-15 PROCEDURE — 36415 COLL VENOUS BLD VENIPUNCTURE: CPT

## 2021-04-15 PROCEDURE — 6370000000 HC RX 637 (ALT 250 FOR IP): Performed by: FAMILY MEDICINE

## 2021-04-15 PROCEDURE — 0J9P0ZZ DRAINAGE OF LEFT LOWER LEG SUBCUTANEOUS TISSUE AND FASCIA, OPEN APPROACH: ICD-10-PCS | Performed by: STUDENT IN AN ORGANIZED HEALTH CARE EDUCATION/TRAINING PROGRAM

## 2021-04-15 PROCEDURE — 80048 BASIC METABOLIC PNL TOTAL CA: CPT

## 2021-04-15 PROCEDURE — 85651 RBC SED RATE NONAUTOMATED: CPT

## 2021-04-15 PROCEDURE — 73700 CT LOWER EXTREMITY W/O DYE: CPT

## 2021-04-15 RX ORDER — MONTELUKAST SODIUM 10 MG/1
10 TABLET ORAL DAILY
Status: DISCONTINUED | OUTPATIENT
Start: 2021-04-15 | End: 2021-04-19 | Stop reason: HOSPADM

## 2021-04-15 RX ORDER — M-VIT,TX,IRON,MINS/CALC/FOLIC 27MG-0.4MG
1 TABLET ORAL DAILY
Status: DISCONTINUED | OUTPATIENT
Start: 2021-04-15 | End: 2021-04-19 | Stop reason: HOSPADM

## 2021-04-15 RX ORDER — DULOXETIN HYDROCHLORIDE 30 MG/1
30 CAPSULE, DELAYED RELEASE ORAL DAILY
Status: DISCONTINUED | OUTPATIENT
Start: 2021-04-15 | End: 2021-04-15

## 2021-04-15 RX ORDER — DOCUSATE SODIUM 100 MG/1
100 CAPSULE, LIQUID FILLED ORAL 2 TIMES DAILY
Status: DISCONTINUED | OUTPATIENT
Start: 2021-04-15 | End: 2021-04-19 | Stop reason: HOSPADM

## 2021-04-15 RX ORDER — HYDROCHLOROTHIAZIDE 25 MG/1
25 TABLET ORAL DAILY
COMMUNITY
End: 2022-03-08

## 2021-04-15 RX ORDER — LEVOTHYROXINE SODIUM 0.05 MG/1
50 TABLET ORAL DAILY
Status: DISCONTINUED | OUTPATIENT
Start: 2021-04-15 | End: 2021-04-19 | Stop reason: HOSPADM

## 2021-04-15 RX ORDER — DULOXETIN HYDROCHLORIDE 60 MG/1
60 CAPSULE, DELAYED RELEASE ORAL DAILY
Status: DISCONTINUED | OUTPATIENT
Start: 2021-04-16 | End: 2021-04-19 | Stop reason: HOSPADM

## 2021-04-15 RX ORDER — TRAMADOL HYDROCHLORIDE 50 MG/1
50 TABLET ORAL EVERY 6 HOURS PRN
Status: DISCONTINUED | OUTPATIENT
Start: 2021-04-15 | End: 2021-04-19 | Stop reason: HOSPADM

## 2021-04-15 RX ORDER — IPRATROPIUM BROMIDE AND ALBUTEROL SULFATE 2.5; .5 MG/3ML; MG/3ML
1 SOLUTION RESPIRATORY (INHALATION) EVERY 4 HOURS PRN
Status: DISCONTINUED | OUTPATIENT
Start: 2021-04-15 | End: 2021-04-19 | Stop reason: HOSPADM

## 2021-04-15 RX ORDER — ACETAMINOPHEN 650 MG
TABLET, EXTENDED RELEASE ORAL
Status: COMPLETED
Start: 2021-04-15 | End: 2021-04-15

## 2021-04-15 RX ORDER — LIDOCAINE HYDROCHLORIDE 10 MG/ML
INJECTION, SOLUTION INFILTRATION; PERINEURAL
Status: COMPLETED
Start: 2021-04-15 | End: 2021-04-15

## 2021-04-15 RX ORDER — ICOSAPENT ETHYL 1000 MG/1
1 CAPSULE ORAL DAILY
Status: DISCONTINUED | OUTPATIENT
Start: 2021-04-15 | End: 2021-04-15

## 2021-04-15 RX ADMIN — DOCUSATE SODIUM 100 MG: 100 CAPSULE, LIQUID FILLED ORAL at 09:27

## 2021-04-15 RX ADMIN — Medication 1 TABLET: at 09:26

## 2021-04-15 RX ADMIN — VANCOMYCIN HYDROCHLORIDE 1250 MG: 10 INJECTION, POWDER, LYOPHILIZED, FOR SOLUTION INTRAVENOUS at 12:23

## 2021-04-15 RX ADMIN — ENOXAPARIN SODIUM 40 MG: 40 INJECTION SUBCUTANEOUS at 09:27

## 2021-04-15 RX ADMIN — VANCOMYCIN HYDROCHLORIDE 1250 MG: 10 INJECTION, POWDER, LYOPHILIZED, FOR SOLUTION INTRAVENOUS at 23:55

## 2021-04-15 RX ADMIN — LEVOTHYROXINE SODIUM 50 MCG: 0.05 TABLET ORAL at 06:52

## 2021-04-15 RX ADMIN — DOCUSATE SODIUM 100 MG: 100 CAPSULE, LIQUID FILLED ORAL at 20:53

## 2021-04-15 RX ADMIN — LIDOCAINE HYDROCHLORIDE: 10 INJECTION, SOLUTION INFILTRATION; PERINEURAL at 18:56

## 2021-04-15 RX ADMIN — Medication: at 18:56

## 2021-04-15 RX ADMIN — PIPERACILLIN AND TAZOBACTAM 3375 MG: 3; .375 INJECTION, POWDER, LYOPHILIZED, FOR SOLUTION INTRAVENOUS at 17:14

## 2021-04-15 RX ADMIN — TRAMADOL HYDROCHLORIDE 50 MG: 50 TABLET, COATED ORAL at 15:00

## 2021-04-15 RX ADMIN — TRAMADOL HYDROCHLORIDE 50 MG: 50 TABLET, COATED ORAL at 20:53

## 2021-04-15 RX ADMIN — PIPERACILLIN AND TAZOBACTAM 3375 MG: 3; .375 INJECTION, POWDER, LYOPHILIZED, FOR SOLUTION INTRAVENOUS at 08:05

## 2021-04-15 RX ADMIN — DULOXETINE HYDROCHLORIDE 30 MG: 30 CAPSULE, DELAYED RELEASE ORAL at 14:00

## 2021-04-15 RX ADMIN — MONTELUKAST SODIUM 10 MG: 10 TABLET ORAL at 09:26

## 2021-04-15 RX ADMIN — TRAMADOL HYDROCHLORIDE 50 MG: 50 TABLET, COATED ORAL at 01:16

## 2021-04-15 ASSESSMENT — PAIN DESCRIPTION - PAIN TYPE
TYPE: ACUTE PAIN
TYPE: ACUTE PAIN

## 2021-04-15 ASSESSMENT — PAIN SCALES - GENERAL
PAINLEVEL_OUTOF10: 2
PAINLEVEL_OUTOF10: 0
PAINLEVEL_OUTOF10: 7
PAINLEVEL_OUTOF10: 5
PAINLEVEL_OUTOF10: 7
PAINLEVEL_OUTOF10: 5
PAINLEVEL_OUTOF10: 2
PAINLEVEL_OUTOF10: 5
PAINLEVEL_OUTOF10: 7
PAINLEVEL_OUTOF10: 0

## 2021-04-15 ASSESSMENT — ENCOUNTER SYMPTOMS
SHORTNESS OF BREATH: 0
ABDOMINAL PAIN: 0

## 2021-04-15 ASSESSMENT — PAIN DESCRIPTION - ONSET
ONSET: ON-GOING
ONSET: ON-GOING

## 2021-04-15 ASSESSMENT — PAIN DESCRIPTION - DESCRIPTORS
DESCRIPTORS: ACHING;CONSTANT;STABBING
DESCRIPTORS: ACHING;CONSTANT

## 2021-04-15 ASSESSMENT — PAIN DESCRIPTION - ORIENTATION
ORIENTATION: LEFT;LOWER
ORIENTATION: LEFT;LOWER
ORIENTATION: LEFT

## 2021-04-15 ASSESSMENT — PAIN DESCRIPTION - LOCATION
LOCATION: LEG

## 2021-04-15 ASSESSMENT — PAIN DESCRIPTION - FREQUENCY
FREQUENCY: CONTINUOUS
FREQUENCY: CONTINUOUS

## 2021-04-15 NOTE — ED NOTES
FIRST PROVIDER CONTACT ASSESSMENT NOTE      Department of Emergency Medicine   4/14/21  8:31 PM EDT    Chief Complaint: No chief complaint on file. History of Present Illness:   Allie Butt is a 48 y.o. female who presents to the ED for    Medical History:  has a past medical history of Asthma, Bladder leak, Depression, Fibromyalgia, High cholesterol, Irritable bowel, Mitral valve prolapse, PONV (postoperative nausea and vomiting), Prediabetes, and Spinal stenosis. Surgical History:  has a past surgical history that includes Hysterectomy; laparoscopy; Colonoscopy; Dilatation, esophagus; Endoscopy, colon, diagnostic; and Cystoscopy (N/A, 9/18/2020). Social History:  reports that she has never smoked. She has never used smokeless tobacco. She reports that she does not drink alcohol or use drugs. Family History: family history includes Diabetes in her father; Heart Disease in her mother; High Blood Pressure in her mother.     *ALLERGIES*     Latex, Adhesive tape, Aspirin, Benadryl [diphenhydramine hcl], Codeine, Demerol, Famotidine, and Phenergan [promethazine hcl]     Physical Exam:      VS:  Pulse 102   Temp 97.1 °F (36.2 °C) (Temporal)   Resp 16   LMP  (LMP Unknown)   SpO2 97%      Initial Plan of Care:  Initiate Treatment-Testing, Proceed toTreatment Area When Bed Available for ED Attending/MLP to Continue Care    -----------------END OF FIRST PROVIDER CONTACT ASSESSMENT NOTE--------------  Electronically signed by SHANIQUA Crump CNP   DD: 4/14/21             SHANIQUA Villagran CNP  04/14/21 2031

## 2021-04-15 NOTE — ED PROVIDER NOTES
HPI:  4/14/21, Time: 10:41 PM EDT         Fernando Ramachandran is a 48 y.o. female presenting to the ED for left leg wound, beginning Saturday 5 days ago after cutting it on the lawn . She pipe to [facility] ER and was given Keflex TID. It got worse and then went to PCP's office and antibiotics were changes to Augmenin BID and doxcycline BID as the redness was giveting laerge in diameter and darker in the center and she was having more pain. She presents here today because now the center looks like a giant blood blister and the redness is much larger. The complaint has been persistent, moderate in severity, and worsened by nothing. She reports cmpliance with medications. Patient denies fever/chills, sore throat, cough, congestion, chest pain, shortness of breath, edema, headache, visual disturbances, focal paresthesias, focal weakness, abdominal pain, nausea, vomiting, diarrhea, constipation, dysuria, hematuria, trauma, neck or back pain, rash or other complaints. ROS:   A complete review of systems was performed and all pertinent positives and negatives are stated within HPI, all other systems reviewed and are negative.      --------------------------------------------- PAST HISTORY ---------------------------------------------  Past Medical History:  has a past medical history of Asthma, Bladder leak, Depression, Fibromyalgia, High cholesterol, Irritable bowel, Mitral valve prolapse, PONV (postoperative nausea and vomiting), Prediabetes, and Spinal stenosis. Past Surgical History:  has a past surgical history that includes Hysterectomy; laparoscopy; Colonoscopy; Dilatation, esophagus; Endoscopy, colon, diagnostic; and Cystoscopy (N/A, 9/18/2020). Social History:  reports that she has never smoked. She has never used smokeless tobacco. She reports that she does not drink alcohol or use drugs.     Family History: family history includes Diabetes in her father; Heart Disease in her mother; High

## 2021-04-15 NOTE — PROGRESS NOTES
Vincent Rosario was ordered Vascepa which is a nonformulary medication. The patient will need to have their home supply of this medication brought in to the hospital for inpatient use. If the medication has not been administered by 1400 on the following day from the time the order was placed, a pharmacist will follow-up with the nurse of the patient to assess the capability of the patient to bring in the medication. If it is determined that the patient cannot supply the medication and it is not available to be dispensed from the pharmacy, a call will be placed to the ordering provider to discuss alternative options.

## 2021-04-15 NOTE — CONSULTS
[address] [place] [address] [facility]    Consultation Note     Admit Date: 4/14/2021  9:17 PM    Reason for Consult:  Left lower extremity cellulitis/abscess    Attending Physician:  Jon Palma MD     Chief Complaint: left leg hurts    HISTORY OF PRESENT ILLNESS:   The patient is a 48 y.o.  female known to the Infectious Diseases service. The patient has the below past med hx. States that she fell down last week and received bruise/cut on LLE. This progressed in terms of erythema and swelling  Presented to ED with LLE wound    She was apparently treated with atb on outpt bases.    She now presents with LLE abscess  visiable purulence     Past Medical History:        Diagnosis Date    Asthma     Bladder leak     for OR 9-18-20     Depression     Fibromyalgia     High cholesterol     Irritable bowel     Mitral valve prolapse     f/u w/ PCP only, no issues, no treatment     PONV (postoperative nausea and vomiting)     Prediabetes     Spinal stenosis      Past Surgical History:        Procedure Laterality Date    COLONOSCOPY      CYSTOSCOPY N/A 9/18/2020    CYSTOSCOPY WITH BOTOX INJECTION OF BLADDER 100 UNITS, BLADDER DILATION performed by Chico Kennedy MD at [address] [place] [address], ESOPHAGUS      ENDOSCOPY, COLON, DIAGNOSTIC      HYSTERECTOMY      partial    LAPAROSCOPY       Current Medications:   Scheduled Meds:   docusate sodium  100 mg Oral BID    DULoxetine  90 mg Oral Daily    levothyroxine  50 mcg Oral Daily    montelukast  10 mg Oral Daily    therapeutic multivitamin-minerals  1 tablet Oral Daily    piperacillin-tazobactam  3,375 mg Intravenous Q8H    enoxaparin  40 mg Subcutaneous Daily    vancomycin  1,250 mg Intravenous Q12H    piperacillin-tazobactam  3,375 mg Intravenous Q8H     Continuous Infusions:  PRN Meds:ipratropium-albuterol, traMADol    Allergies:  Latex, Adhesive tape, Aspirin, Benadryl [diphenhydramine hcl], Codeine, Demerol, Famotidine, and Phenergan [promethazine hcl]    Social History:   Social History     Socioeconomic History    Marital status:      Spouse name: None    Number of children: None    Years of education: None    Highest education level: None   Occupational History    None   Social Needs    Financial resource strain: None    Food insecurity     Worry: None     Inability: None    Transportation needs     Medical: None     Non-medical: None   Tobacco Use    Smoking status: Never Smoker    Smokeless tobacco: Never Used   Substance and Sexual Activity    Alcohol use: No    Drug use: No    Sexual activity: None   Lifestyle    Physical activity     Days per week: None     Minutes per session: None    Stress: None   Relationships    Social connections     Talks on phone: None     Gets together: None     Attends Worship service: None     Active member of club or organization: None     Attends meetings of clubs or organizations: None     Relationship status: None    Intimate partner violence     Fear of current or ex partner: None     Emotionally abused: None     Physically abused: None     Forced sexual activity: None   Other Topics Concern    None   Social History Narrative    None     Tobacco: No  Alcohol: No  Pets: No  Travel: No    Family History:       Problem Relation Age of Onset    Heart Disease Mother     High Blood Pressure Mother     Diabetes Father    . Otherwise non-pertinent to the chief complaint. REVIEW OF SYSTEMS:    CONSTITUTIONAL:  No chills, fevers or night sweats. No loss of weight. EYES:  No double vision or drainage from eyes, ears or throat. HEENT:  No neck stiffness. No dysphagia. No drainage from eyes, ears or throat  RESPIRATORY:  No cough, productive sputum or hemoptysis. CARDIOVASCULAR:  No chest pain, palpitations, orthopnea or dyspnea on exertion.   GASTROINTESTINAL:  No nausea, vomiting, diarrhea or constipation or hematochezia   GENITOURINARY:  No frequency burning dysuria or hematuria. INTEGUMENT/BREAST:  No rash or breast masses. HEMATOLOGIC/LYMPHATIC:  No lymphadenopathy or blood dyscrasics. ALLERGIC/IMMUNOLOGIC:  No anaphylaxis. ENDOCRINE:  No polyuria or polydipsia or temperature intolerance. MUSCULOSKELETAL:  No myalgia or arthralgia. Full ROM. NEUROLOGICAL:  No focal motor sensory deficit. BEHAVIOR/PSYCH:  No psychosis. PHYSICAL EXAM:    Vitals:    /68   Pulse 85   Temp 96.8 °F (36 °C) (Temporal)   Resp 20   Ht 5' 1\" (1.549 m)   Wt (!) 320 lb (145.2 kg)   LMP  (LMP Unknown)   SpO2 96%   BMI 60.46 kg/m²   Constitutional: The patient is awake, alert, and oriented. Skin: Warm and dry. No rashes were noted. HEENT: Eyes show round, and reactive pupils. No jaundice. Moist mucous membranes, no ulcerations, no thrush. Neck: Supple to movements. No lymphadenopathy. Chest: No use of accessory muscles to breathe. Symmetrical expansion. Auscultation reveals no wheezing, crackles, or rhonchi. Cardiovascular: S1 and S2 are rhythmic and regular. No murmurs appreciated. Abdomen: Positive bowel sounds to auscultation. Benign to palpation. No masses felt. No hepatosplenomegaly. Extremities: No clubbing, no cyanosis, no edema.   Lines: peripheral      CBC+dif:  Recent Labs     04/14/21  2121 04/15/21  0514   WBC 12.3* 10.5   HGB 13.5 13.0   HCT 42.3 41.1   MCV 94.8 94.9    324   NEUTROABS 8.17*  --      Lab Results   Component Value Date    CRP 5.5 (H) 04/15/2021     No results found for: CRP  Lab Results   Component Value Date    SEDRATE 51 (H) 04/15/2021    SEDRATE 40 (H) 04/14/2021     Lab Results   Component Value Date    ALT 29 04/14/2021    AST 31 04/14/2021    ALKPHOS 90 04/14/2021    BILITOT 0.3 04/14/2021     Lab Results   Component Value Date     04/15/2021    K 3.3 04/15/2021    CL 99 04/15/2021    CO2 25 04/15/2021    BUN 13 04/15/2021    CREATININE 1.0 04/15/2021    GFRAA >60 04/15/2021    LABGLOM 59 04/15/2021    GLUCOSE 103 04/15/2021    GLUCOSE 116 10/21/2011    PROT 8.0 04/14/2021    LABALBU 4.1 04/14/2021    LABALBU 4.6 10/21/2011    CALCIUM 9.2 04/15/2021    BILITOT 0.3 04/14/2021    ALKPHOS 90 04/14/2021    AST 31 04/14/2021    ALT 29 04/14/2021       Lab Results   Component Value Date    PROTIME 11.2 05/12/2015    INR 1.0 05/12/2015       Lab Results   Component Value Date    TSH 2.640 06/06/2017       Lab Results   Component Value Date    COLORU Yellow 09/29/2020    PHUR 6.0 09/29/2020    WBCUA >20 09/29/2020    WBCUA NONE 04/23/2011    RBCUA 5-10 09/29/2020    RBCUA NONE 08/03/2013    BACTERIA MODERATE 09/29/2020    CLARITYU CLOUDY 09/29/2020    SPECGRAV 1.025 09/29/2020    LEUKOCYTESUR LARGE 09/29/2020    UROBILINOGEN 0.2 09/29/2020    BILIRUBINUR Negative 09/29/2020    BILIRUBINUR NEGATIVE 04/23/2011    BLOODU TRACE-INTACT 09/29/2020    GLUCOSEU Negative 09/29/2020    GLUCOSEU NEGATIVE 04/23/2011       No results found for: LVD5ULX, BEART, G7LCDPXP, PHART, THGBART, KJT7YSA, PO2ART, GER1FVC  Radiology:  XR TIBIA FIBULA LEFT (2 VIEWS)   Final Result   Persistent soft tissue stranding in the lateral and anterior proximal to mid   left shin. No underlying osseous abnormality seen. CT TIBIA FIBULA LEFT WO CONTRAST    (Results Pending)       Microbiology:  Pending  No results for input(s): BC in the last 72 hours. No results for input(s): ORG in the last 72 hours. No results for input(s): Satnam Hamilton in the last 72 hours. No results for input(s): STREPNEUMAGU in the last 72 hours. No results for input(s): LP1UAG in the last 72 hours. Recent Labs     04/14/21  2248   ASO 24     No results for input(s): CULTRESP in the last 72 hours.     Assessment:  · LLE cellulitis/abscess  · CT of LLE  Rule out abscess  · Consult surgery for poss I and D  · Cultures pending  · I will call micro  I dont see a gram stain     Plan:    · Cont vanco and zosyn  · She denies animal exposure  · Check cultures  · Baseline ESR, CRP  · Monitor labs  · Will follow with you    Thank you for having us see this patient in consultation. I will be discussing this case with the treating physicians.       Electronically signed by Ny Stark MD on 4/15/2021 at 11:23 AM

## 2021-04-15 NOTE — PLAN OF CARE
MESSAGED DR BYRD THAT PHARMACIST STATES PT VERBALIZED THAT SHE NO LONGER TAKES VASCEPA SO CAN IT BE DISCONTINUED.  SEE ORDERS

## 2021-04-15 NOTE — CONSULTS
neomycin-bacitracin-polymyxin (NEOSPORIN) 400-5-5000 ointment Apply topically 2 times daily.  4/9/21 4/19/21 Yes Mat Marques MD   Multiple Vitamins-Minerals (THERAPEUTIC MULTIVITAMIN-MINERALS) tablet Take 1 tablet by mouth daily   Yes Historical Provider, MD   albuterol sulfate HFA (VENTOLIN HFA) 108 (90 Base) MCG/ACT inhaler Inhale 2 puffs into the lungs every 6 hours as needed for Wheezing   Yes Historical Provider, MD   docusate sodium (COLACE) 100 MG capsule Take 100 mg by mouth daily    Yes Historical Provider, MD   ibuprofen (ADVIL;MOTRIN) 800 MG tablet Take 1 tablet by mouth every 8 hours as needed for Pain 6/9/17  Yes Nanette Chaves MD   DULoxetine (CYMBALTA) 30 MG extended release capsule Take 1 capsule by mouth daily  Patient taking differently: Take 60 mg by mouth daily  5/23/17  Yes Cecil Bradshaw MD   montelukast (SINGULAIR) 10 MG tablet Take 10 mg by mouth daily    Yes Historical Provider, MD   Icosapent Ethyl (VASCEPA) 1 g CAPS capsule Take 1 capsule by mouth daily    Historical Provider, MD   Cholecalciferol (VITAMIN D) 2000 UNITS CAPS capsule Take 5,000 Units by mouth     Historical Provider, MD   Levothyroxine Sodium (SYNTHROID PO) Take 50 mcg by mouth daily     Historical Provider, MD       Allergies   Allergen Reactions    Latex Hives    Adhesive Tape Hives    Aspirin Other (See Comments)     Not sure    Benadryl [Diphenhydramine Hcl] Hives    Codeine Hives    Demerol Itching    Famotidine Other (See Comments)     Not sure    Phenergan [Promethazine Hcl] Nausea And Vomiting       Family History   Problem Relation Age of Onset    Heart Disease Mother     High Blood Pressure Mother     Diabetes Father        Social History     Tobacco Use    Smoking status: Never Smoker    Smokeless tobacco: Never Used   Substance Use Topics    Alcohol use: No    Drug use: No         Review of Systems   General ROS: positive for  - occasional fever  Hematological and Lymphatic ROS: Not on home anticoagulation  Respiratory ROS: no cough, shortness of breath, or wheezing  Cardiovascular ROS: no chest pain or dyspnea on exertion  Gastrointestinal ROS: Positive for nausea but no vomiting  Genito-Urinary ROS: no dysuria, trouble voiding, or hematuria  Musculoskeletal ROS: Story of polymyalgia rheumatica      PHYSICAL EXAM:    Vitals:    04/15/21 1200   BP: 128/74   Pulse: 80   Resp: 18   Temp: 97.4 °F (36.3 °C)   SpO2: 98%       General Appearance:  awake, alert, oriented, in no acute distress  Skin: Left lower extremity with large swath of erythema with central edema, induration, superficial necrosis. Head/face:  NCAT  Eyes:  No gross abnormalities. Lungs:  Breathing Pattern: regular, no distress  Heart:  Heart regular rate and normotensive  Abdomen: Obese. soft, non-tender, nondistended. No guarding rigidity  Extremities:  Left lower extremity with large swath of erythema with central edema, induration, superficial necrosis. LABS:    CBC  Recent Labs     04/15/21  0514   WBC 10.5   HGB 13.0   HCT 41.1        BMP  Recent Labs     04/15/21  0514      K 3.3*   CL 99   CO2 25   BUN 13   CREATININE 1.0   CALCIUM 9.2     Liver Function  Recent Labs     04/14/21  2112   BILITOT 0.3   AST 31   ALT 29   ALKPHOS 90   PROT 8.0   LABALBU 4.1     No results for input(s): LACTATE in the last 72 hours. No results for input(s): INR, PTT in the last 72 hours. Invalid input(s): PT    RADIOLOGY    Xr Tibia Fibula Left (2 Views)    Result Date: 4/14/2021  EXAMINATION: 2 XRAY VIEWS OF THE LEFT TIBIA AND FIBULA 4/14/2021 9:14 pm COMPARISON: Left tibia/fibula series from April 9, 2021 HISTORY: ORDERING SYSTEM PROVIDED HISTORY: Mid shin wound TECHNOLOGIST PROVIDED HISTORY: Reason for exam:->Mid shin wound What reading provider will be dictating this exam?->CRC FINDINGS: Radiographs of the left tibia and fibula demonstrate no fracture with preserved alignment. No abnormal periosteal elevation.   No evidence lower extremity cellulitis and abscess    PLAN:    -ABX per ID  -Bedside I&D tonight on 4/15  -Overall care per primary    Plan discussed with Dr. Bk Michele    Electronically signed by Luisa Jacome DO on 4/15/21 at 3:54 PM EDT

## 2021-04-15 NOTE — H&P
MULTIVITAMIN-MINERALS) tablet Take 1 tablet by mouth daily   Yes Historical Provider, MD   albuterol sulfate HFA (VENTOLIN HFA) 108 (90 Base) MCG/ACT inhaler Inhale 2 puffs into the lungs every 6 hours as needed for Wheezing   Yes Historical Provider, MD   docusate sodium (COLACE) 100 MG capsule Take 100 mg by mouth daily    Yes Historical Provider, MD   ibuprofen (ADVIL;MOTRIN) 800 MG tablet Take 1 tablet by mouth every 8 hours as needed for Pain 6/9/17  Yes Ag La MD   DULoxetine (CYMBALTA) 30 MG extended release capsule Take 1 capsule by mouth daily  Patient taking differently: Take 60 mg by mouth daily  5/23/17  Yes Cecil Bradshaw MD   montelukast (SINGULAIR) 10 MG tablet Take 10 mg by mouth daily    Yes Historical Provider, MD   Icosapent Ethyl (VASCEPA) 1 g CAPS capsule Take 1 capsule by mouth daily    Historical Provider, MD   Cholecalciferol (VITAMIN D) 2000 UNITS CAPS capsule Take 5,000 Units by mouth     Historical Provider, MD   Levothyroxine Sodium (SYNTHROID PO) Take 50 mcg by mouth daily     Historical Provider, MD        Allergies   Latex, Adhesive tape, Aspirin, Benadryl [diphenhydramine hcl], Codeine, Demerol, Famotidine, and Phenergan [promethazine hcl]    Social History     Social History     Tobacco History     Smoking Status  Never Smoker    Smokeless Tobacco Use  Never Used          Alcohol History     Alcohol Use Status  No          Drug Use     Drug Use Status  No          Sexual Activity     Sexually Active  Not Asked                Family History     Family History   Problem Relation Age of Onset    Heart Disease Mother     High Blood Pressure Mother     Diabetes Father        Review of Systems   Review of Systems   Constitutional: Positive for activity change. Negative for fever. HENT: Negative for congestion. Respiratory: Negative for shortness of breath. Cardiovascular: Negative for chest pain. Gastrointestinal: Negative for abdominal pain.    Genitourinary: Negative for difficulty urinating. Musculoskeletal: Negative for arthralgias. Skin: Positive for rash and wound. Neurological: Negative for dizziness. Physical Exam   BP (!) 145/69   Pulse 104   Temp 97.3 °F (36.3 °C) (Temporal)   Resp 18   Ht 5' 1\" (1.549 m)   Wt (!) 320 lb (145.2 kg)   LMP  (LMP Unknown)   SpO2 98%   BMI 60.46 kg/m²     Physical Exam  Vitals signs reviewed. HENT:      Head: Normocephalic. Mouth/Throat:      Mouth: Mucous membranes are moist.   Eyes:      Pupils: Pupils are equal, round, and reactive to light. Cardiovascular:      Rate and Rhythm: Normal rate and regular rhythm. Pulses: Normal pulses. Heart sounds: Normal heart sounds. Pulmonary:      Effort: Pulmonary effort is normal. No respiratory distress. Breath sounds: Normal breath sounds. No wheezing. Abdominal:      General: Abdomen is flat. Bowel sounds are normal. There is no distension. Tenderness: There is no abdominal tenderness. Skin:     Capillary Refill: Capillary refill takes less than 2 seconds. Comments: LLE large erythema with central wound with purulent material   Neurological:      General: No focal deficit present. Mental Status: She is alert and oriented to person, place, and time.    Psychiatric:         Mood and Affect: Mood normal.         Behavior: Behavior normal.         Labs      Recent Results (from the past 24 hour(s))   Comprehensive Metabolic Panel    Collection Time: 04/14/21  9:12 PM   Result Value Ref Range    Sodium 141 132 - 146 mmol/L    Potassium 3.4 (L) 3.5 - 5.0 mmol/L    Chloride 98 98 - 107 mmol/L    CO2 29 22 - 29 mmol/L    Anion Gap 14 7 - 16 mmol/L    Glucose 102 (H) 74 - 99 mg/dL    BUN 10 6 - 20 mg/dL    CREATININE 1.0 0.5 - 1.0 mg/dL    GFR Non-African American 59 >=60 mL/min/1.73    GFR African American >60     Calcium 9.8 8.6 - 10.2 mg/dL    Total Protein 8.0 6.4 - 8.3 g/dL    Albumin 4.1 3.5 - 5.2 g/dL    Total Bilirubin 0.3 0.0 - 1.2 mg/dL    Alkaline Phosphatase 90 35 - 104 U/L    ALT 29 0 - 32 U/L    AST 31 0 - 31 U/L   CBC Auto Differential    Collection Time: 04/14/21  9:21 PM   Result Value Ref Range    WBC 12.3 (H) 4.5 - 11.5 E9/L    RBC 4.46 3.50 - 5.50 E12/L    Hemoglobin 13.5 11.5 - 15.5 g/dL    Hematocrit 42.3 34.0 - 48.0 %    MCV 94.8 80.0 - 99.9 fL    MCH 30.3 26.0 - 35.0 pg    MCHC 31.9 (L) 32.0 - 34.5 %    RDW 14.6 11.5 - 15.0 fL    Platelets 946 863 - 664 E9/L    MPV 9.8 7.0 - 12.0 fL    Neutrophils % 66.2 43.0 - 80.0 %    Immature Granulocytes % 0.5 0.0 - 5.0 %    Lymphocytes % 25.8 20.0 - 42.0 %    Monocytes % 5.3 2.0 - 12.0 %    Eosinophils % 1.8 0.0 - 6.0 %    Basophils % 0.4 0.0 - 2.0 %    Neutrophils Absolute 8.17 (H) 1.80 - 7.30 E9/L    Immature Granulocytes # 0.06 E9/L    Lymphocytes Absolute 3.18 1.50 - 4.00 E9/L    Monocytes Absolute 0.65 0.10 - 0.95 E9/L    Eosinophils Absolute 0.22 0.05 - 0.50 E9/L    Basophils Absolute 0.05 0.00 - 0.20 E9/L   Lactic Acid, Plasma    Collection Time: 04/14/21  9:21 PM   Result Value Ref Range    Lactic Acid 2.2 0.5 - 2.2 mmol/L   SEDIMENTATION RATE    Collection Time: 04/14/21 10:48 PM   Result Value Ref Range    Sed Rate 40 (H) 0 - 20 mm/Hr   ANTISTREPTOLYSIN O TITER    Collection Time: 04/14/21 10:48 PM   Result Value Ref Range    ASO 24 0 - 200 IU/mL   CBC    Collection Time: 04/15/21  5:14 AM   Result Value Ref Range    WBC 10.5 4.5 - 11.5 E9/L    RBC 4.33 3.50 - 5.50 E12/L    Hemoglobin 13.0 11.5 - 15.5 g/dL    Hematocrit 41.1 34.0 - 48.0 %    MCV 94.9 80.0 - 99.9 fL    MCH 30.0 26.0 - 35.0 pg    MCHC 31.6 (L) 32.0 - 34.5 %    RDW 14.6 11.5 - 15.0 fL    Platelets 510 435 - 518 E9/L    MPV 9.6 7.0 - 12.0 fL   Basic metabolic panel    Collection Time: 04/15/21  5:14 AM   Result Value Ref Range    Sodium 136 132 - 146 mmol/L    Potassium 3.3 (L) 3.5 - 5.0 mmol/L    Chloride 99 98 - 107 mmol/L    CO2 25 22 - 29 mmol/L    Anion Gap 12 7 - 16 mmol/L    Glucose 103 (H) 74 - 99 mg/dL BUN 13 6 - 20 mg/dL    CREATININE 1.0 0.5 - 1.0 mg/dL    GFR Non-African American 59 >=60 mL/min/1.73    GFR African American >60     Calcium 9.2 8.6 - 10.2 mg/dL   C-REACTIVE PROTEIN    Collection Time: 04/15/21  5:14 AM   Result Value Ref Range    CRP 5.5 (H) 0.0 - 0.4 mg/dL        Imaging/Diagnostics Last 24 Hours   Xr Tibia Fibula Left (2 Views)    Result Date: 4/14/2021  EXAMINATION: 2 XRAY VIEWS OF THE LEFT TIBIA AND FIBULA 4/14/2021 9:14 pm COMPARISON: Left tibia/fibula series from April 9, 2021 HISTORY: ORDERING SYSTEM PROVIDED HISTORY: Mid shin wound TECHNOLOGIST PROVIDED HISTORY: Reason for exam:->Mid shin wound What reading provider will be dictating this exam?->CRC FINDINGS: Radiographs of the left tibia and fibula demonstrate no fracture with preserved alignment. No abnormal periosteal elevation. No evidence of osseous destruction. Limited evaluation of the left knee and ankle joints are unremarkable. Osseous mineralization is normal. There is superficial soft tissue stranding along the anterolateral aspect of the left lower leg. No definite evidence of subcutaneous air. Persistent soft tissue stranding in the lateral and anterior proximal to mid left shin. No underlying osseous abnormality seen. Assessment      Hospital Problems           Last Modified POA    Left leg cellulitis 4/14/2021 Yes      Asthma  Hypothyroidsm  Hyperlipidemia    Plan   Started on IV vanc and zosyn. ID to see. Monitor cultures and labs. ESR, CRP. Continue rest of home meds.     Consultations Ordered:  IP CONSULT TO HOSPITALIST  IP CONSULT TO PHARMACY  IP CONSULT TO INFECTIOUS DISEASES    Electronically signed by Jon Palma MD on 4/15/21 at 6:50 AM EDT

## 2021-04-15 NOTE — PROGRESS NOTES
Pharmacy Consultation Note  (Antibiotic Dosing and Monitoring)    Initial consult date: 4/15/21  Consulting physician: Dr. Bar Carlos   Drug: Vancomycin  Indication: Cellulitis of left lower extremity     Age/  Gender Height Weight IBW Dosing weight  Allergy Information   46 y.o./female 5' 1\" (154.9 cm) (!) 320 lb (145.2 kg)     Ideal body weight: 47.8 kg (105 lb 6.1 oz)  Adjusted ideal body weight: 86.7 kg (191 lb 3.6 oz)  86.7 kg  Latex, Adhesive tape, Aspirin, Benadryl [diphenhydramine hcl], Codeine, Demerol, Famotidine, and Phenergan [promethazine hcl]      Temp (24hrs), Av.1 °F (36.2 °C), Min:97.1 °F (36.2 °C), Max:97.1 °F (36.2 °C)          Date  WBC BUN SCr CrCl  (mL/min) Drug/Dose Time   Given Level(s)   (Time) Comments    12.3 10 1.0 94 Vancomycin 2000 mg IV x1 2347     4/15     Vancomycin 1250 mg IV q12h <1200>                               No intake or output data in the 24 hours ending 04/15/21 0024    Historical Cultures:  Organism   Date Value Ref Range Status   2020 Escherichia coli (A)  Final     No results for input(s): BC in the last 72 hours. Cultures:  available culture and sensitivity results were reviewed in [facility]    Assessment:  · 48 y.o. female has been initiated vancomycin and Zosyn for cellulitis of left lower extremity after cutting it on the . At [facility] she was given cephalexin 500 mg TID. Wound got worse and her PCP changed the antibiotic to Augmenin 875-125 mg BID.     · Estimated CrCl = 94 mL/min  · Goal trough level = 15-20 mcg/mL    Plan:  · Will initiate vancomycin at a dose of 1250 mg IV q12h  · Monitor renal function   · Clinical pharmacy to follow      Tamie Treviño, [address] [place] [address] 4/15/2021 12:24 AM

## 2021-04-15 NOTE — CARE COORDINATION
4/15/2021 social work transition of care planning  Pt is from home with family and had been independent. Pt pcp is Michael Rosado and pharmacy is [facility] [address]. Explained sw role in transition of care planning. Pt plan is home, family will transport at discharge. Pt has no preference for hhc provider or dme provider if needed. Await final atb plan. Referal made to [facility].  Electronically signed by JENNY Butler on 4/15/2021 at 2:47 PM

## 2021-04-15 NOTE — PROCEDURES
Vincent Rosario is a 48 y.o. female patient. 1. Cellulitis of left lower extremity    2. Wound infection      Past Medical History:   Diagnosis Date    Asthma     Bladder leak     for OR 9-18-20     Depression     Fibromyalgia     High cholesterol     Irritable bowel     Mitral valve prolapse     f/u w/ PCP only, no issues, no treatment     PONV (postoperative nausea and vomiting)     Prediabetes     Spinal stenosis      Blood pressure 128/74, pulse 88, temperature 97.1 °F (36.2 °C), temperature source Temporal, resp. rate 16, height 5' 1\" (1.549 m), weight (!) 320 lb (145.2 kg), SpO2 93 %, not currently breastfeeding. Procedures      Incision and Drainage Procedure Note    Indication: Left lower extremity abscess    Procedure: The patient was positioned appropriately and the skin over the incision site was prepped with betadine and draped in a sterile fashion. Local anesthesia was obtained by infiltration using 20 cc of 1% Lidocaine with epinephrine. An transvers incision was then made over the apex of the lesion and approximately 8 cc of bloody material was expressed. A vertical incision was then made forming a cruciate incision. The four corners of skin edge were injected with anaesthetic and cut away with scissors. Loculations were not present. The drainage cavity was then irrigated, packed with sterile gauze and dressed with gauze and tape. The patient tolerated the procedure well.     Complications: None      Becca Nam DO  4/15/2021

## 2021-04-16 LAB
ANION GAP SERPL CALCULATED.3IONS-SCNC: 12 MMOL/L (ref 7–16)
BUN BLDV-MCNC: 10 MG/DL (ref 6–20)
CALCIUM SERPL-MCNC: 8.9 MG/DL (ref 8.6–10.2)
CHLORIDE BLD-SCNC: 98 MMOL/L (ref 98–107)
CO2: 27 MMOL/L (ref 22–29)
CREAT SERPL-MCNC: 0.9 MG/DL (ref 0.5–1)
GFR AFRICAN AMERICAN: >60
GFR NON-AFRICAN AMERICAN: >60 ML/MIN/1.73
GLUCOSE BLD-MCNC: 99 MG/DL (ref 74–99)
HCT VFR BLD CALC: 37.9 % (ref 34–48)
HEMOGLOBIN: 12.3 G/DL (ref 11.5–15.5)
MCH RBC QN AUTO: 30.6 PG (ref 26–35)
MCHC RBC AUTO-ENTMCNC: 32.5 % (ref 32–34.5)
MCV RBC AUTO: 94.3 FL (ref 80–99.9)
ORGANISM: ABNORMAL
PDW BLD-RTO: 14.4 FL (ref 11.5–15)
PLATELET # BLD: 295 E9/L (ref 130–450)
PMV BLD AUTO: 9.8 FL (ref 7–12)
POTASSIUM SERPL-SCNC: 3 MMOL/L (ref 3.5–5)
RBC # BLD: 4.02 E12/L (ref 3.5–5.5)
SODIUM BLD-SCNC: 137 MMOL/L (ref 132–146)
WBC # BLD: 8.7 E9/L (ref 4.5–11.5)
WOUND/ABSCESS: ABNORMAL

## 2021-04-16 PROCEDURE — 85027 COMPLETE CBC AUTOMATED: CPT

## 2021-04-16 PROCEDURE — 1200000000 HC SEMI PRIVATE

## 2021-04-16 PROCEDURE — 36415 COLL VENOUS BLD VENIPUNCTURE: CPT

## 2021-04-16 PROCEDURE — 6360000002 HC RX W HCPCS: Performed by: FAMILY MEDICINE

## 2021-04-16 PROCEDURE — 99232 SBSQ HOSP IP/OBS MODERATE 35: CPT | Performed by: SURGERY

## 2021-04-16 PROCEDURE — 6370000000 HC RX 637 (ALT 250 FOR IP): Performed by: FAMILY MEDICINE

## 2021-04-16 PROCEDURE — 80048 BASIC METABOLIC PNL TOTAL CA: CPT

## 2021-04-16 PROCEDURE — 2580000003 HC RX 258: Performed by: FAMILY MEDICINE

## 2021-04-16 RX ORDER — MECOBALAMIN 5000 MCG
5 TABLET,DISINTEGRATING ORAL NIGHTLY
Status: DISCONTINUED | OUTPATIENT
Start: 2021-04-16 | End: 2021-04-19 | Stop reason: HOSPADM

## 2021-04-16 RX ADMIN — PIPERACILLIN AND TAZOBACTAM 3375 MG: 3; .375 INJECTION, POWDER, LYOPHILIZED, FOR SOLUTION INTRAVENOUS at 16:50

## 2021-04-16 RX ADMIN — Medication 1 TABLET: at 08:47

## 2021-04-16 RX ADMIN — TRAMADOL HYDROCHLORIDE 50 MG: 50 TABLET, COATED ORAL at 13:52

## 2021-04-16 RX ADMIN — VANCOMYCIN HYDROCHLORIDE 1250 MG: 10 INJECTION, POWDER, LYOPHILIZED, FOR SOLUTION INTRAVENOUS at 13:14

## 2021-04-16 RX ADMIN — TRAMADOL HYDROCHLORIDE 50 MG: 50 TABLET, COATED ORAL at 20:29

## 2021-04-16 RX ADMIN — DOCUSATE SODIUM 100 MG: 100 CAPSULE, LIQUID FILLED ORAL at 08:46

## 2021-04-16 RX ADMIN — PIPERACILLIN AND TAZOBACTAM 3375 MG: 3; .375 INJECTION, POWDER, LYOPHILIZED, FOR SOLUTION INTRAVENOUS at 02:25

## 2021-04-16 RX ADMIN — PIPERACILLIN AND TAZOBACTAM 3375 MG: 3; .375 INJECTION, POWDER, LYOPHILIZED, FOR SOLUTION INTRAVENOUS at 09:35

## 2021-04-16 RX ADMIN — MONTELUKAST SODIUM 10 MG: 10 TABLET ORAL at 08:45

## 2021-04-16 RX ADMIN — LEVOTHYROXINE SODIUM 50 MCG: 0.05 TABLET ORAL at 05:44

## 2021-04-16 RX ADMIN — DOCUSATE SODIUM 100 MG: 100 CAPSULE, LIQUID FILLED ORAL at 20:29

## 2021-04-16 RX ADMIN — Medication 5 MG: at 20:29

## 2021-04-16 RX ADMIN — TRAMADOL HYDROCHLORIDE 50 MG: 50 TABLET, COATED ORAL at 05:44

## 2021-04-16 RX ADMIN — ENOXAPARIN SODIUM 40 MG: 40 INJECTION SUBCUTANEOUS at 09:02

## 2021-04-16 RX ADMIN — DULOXETINE HYDROCHLORIDE 60 MG: 60 CAPSULE, DELAYED RELEASE ORAL at 08:44

## 2021-04-16 ASSESSMENT — PAIN DESCRIPTION - LOCATION
LOCATION: LEG

## 2021-04-16 ASSESSMENT — PAIN SCALES - GENERAL
PAINLEVEL_OUTOF10: 3
PAINLEVEL_OUTOF10: 9
PAINLEVEL_OUTOF10: 3
PAINLEVEL_OUTOF10: 5
PAINLEVEL_OUTOF10: 6
PAINLEVEL_OUTOF10: 3
PAINLEVEL_OUTOF10: 0
PAINLEVEL_OUTOF10: 0

## 2021-04-16 ASSESSMENT — PAIN DESCRIPTION - PAIN TYPE
TYPE: SURGICAL PAIN
TYPE: ACUTE PAIN

## 2021-04-16 ASSESSMENT — PAIN DESCRIPTION - FREQUENCY
FREQUENCY: INTERMITTENT
FREQUENCY: CONTINUOUS

## 2021-04-16 ASSESSMENT — PAIN DESCRIPTION - ONSET
ONSET: GRADUAL
ONSET: ON-GOING

## 2021-04-16 ASSESSMENT — PAIN DESCRIPTION - PROGRESSION
CLINICAL_PROGRESSION: NOT CHANGED
CLINICAL_PROGRESSION: GRADUALLY WORSENING

## 2021-04-16 ASSESSMENT — PAIN DESCRIPTION - DESCRIPTORS
DESCRIPTORS: THROBBING;DISCOMFORT;DULL
DESCRIPTORS: ACHING;DISCOMFORT
DESCRIPTORS: ACHING;DISCOMFORT;SORE

## 2021-04-16 ASSESSMENT — PAIN - FUNCTIONAL ASSESSMENT
PAIN_FUNCTIONAL_ASSESSMENT: PREVENTS OR INTERFERES SOME ACTIVE ACTIVITIES AND ADLS
PAIN_FUNCTIONAL_ASSESSMENT: PREVENTS OR INTERFERES SOME ACTIVE ACTIVITIES AND ADLS

## 2021-04-16 ASSESSMENT — ENCOUNTER SYMPTOMS
SHORTNESS OF BREATH: 0
ABDOMINAL PAIN: 0

## 2021-04-16 ASSESSMENT — PAIN DESCRIPTION - ORIENTATION
ORIENTATION: LEFT

## 2021-04-16 NOTE — HOME CARE
Larry Saldana    Ordered therapy at time of quote: VANCO 1.25GM Q12H + ZOSYN 3.375GM IV Q8H  Coverage: 80%  Total pt responsibility: $18.29/DAY

## 2021-04-16 NOTE — PROGRESS NOTES
Patient:  Nabil Link 48 y.o. female MRN: [id number]     Date of Service: 4/16/2021     CC: left leg abscess, cellulitis    Subjective       States left leg pain improving. Packing removed this am per resident. Denies n/v/d. No fevers/chills overnight. Objective     Physical Exam:  · Vitals: /73   Pulse 83   Temp 97.3 °F (36.3 °C) (Temporal)   Resp 18   Ht 5' 1\" (1.549 m)   Wt (!) 320 lb (145.2 kg)   LMP  (LMP Unknown)   SpO2 95%   BMI 60.46 kg/m²     · I & O - 24hr: No intake/output data recorded. · General Appearance: alert, appears stated age and cooperative. Lying in bed in NAD  · HEENT:  Head: Normocephalic, no lesions, without obvious abnormality. Pupils equal/round. Mucous membranes pink, moist  · Neck: supple, symmetrical, trachea midline  · Lung: clear to auscultation bilaterally. Respirations even and unlabored on RA  · Heart: regular rate and rhythm, S1, S2 normal, no murmur, click, rub or gallop  · Abdomen: soft, non-tender; bowel sounds normal; no masses,  no organomegaly  · Extremities:  + edema LLE. Wound packed, surrounding erythema little better.  Less tender   · Neurologic: Mental status: Alert, oriented, thought content appropriate    Pertinent Labs & Imaging Studies     CBC with Differential:    Lab Results   Component Value Date    WBC 10.5 04/15/2021    RBC 4.33 04/15/2021    HGB 13.0 04/15/2021    HCT 41.1 04/15/2021     04/15/2021    MCV 94.9 04/15/2021    MCH 30.0 04/15/2021    MCHC 31.6 04/15/2021    RDW 14.6 04/15/2021    SEGSPCT 60 08/03/2013    LYMPHOPCT 25.8 04/14/2021    MONOPCT 5.3 04/14/2021    BASOPCT 0.4 04/14/2021    MONOSABS 0.65 04/14/2021    LYMPHSABS 3.18 04/14/2021    EOSABS 0.22 04/14/2021    BASOSABS 0.05 04/14/2021     CMP:    Lab Results   Component Value Date     04/15/2021    K 3.3 04/15/2021    CL 99 04/15/2021    CO2 25 04/15/2021    BUN 13 04/15/2021    CREATININE 1.0 04/15/2021    GFRAA >60 04/15/2021    LABGLOM 59 04/15/2021

## 2021-04-16 NOTE — PROGRESS NOTES
Infectious Disease  Progress Note  [facility]    Chief Complaint:  Left leg infection    Subjective: painful left leg     Scheduled Meds:   melatonin  5 mg Oral Nightly    docusate sodium  100 mg Oral BID    levothyroxine  50 mcg Oral Daily    montelukast  10 mg Oral Daily    therapeutic multivitamin-minerals  1 tablet Oral Daily    piperacillin-tazobactam  3,375 mg Intravenous Q8H    enoxaparin  40 mg Subcutaneous Daily    vancomycin  1,250 mg Intravenous Q12H    DULoxetine  60 mg Oral Daily     Continuous Infusions:  PRN Meds:ipratropium-albuterol, traMADol    Patient Vitals for the past 24 hrs:   BP Temp Temp src Pulse Resp SpO2   04/16/21 0745 130/63 98.4 °F (36.9 °C) Oral 82 17 96 %   04/16/21 0045 126/73 97.3 °F (36.3 °C) Temporal 83 18 95 %   04/15/21 1622  97.1 °F (36.2 °C) Temporal 88 16 93 %   04/15/21 1200 128/74 97.4 °F (36.3 °C) Temporal 80 18 98 %       CBC with Differential:    Lab Results   Component Value Date    WBC 8.7 04/16/2021    RBC 4.02 04/16/2021    HGB 12.3 04/16/2021    HCT 37.9 04/16/2021     04/16/2021    MCV 94.3 04/16/2021    MCH 30.6 04/16/2021    MCHC 32.5 04/16/2021    RDW 14.4 04/16/2021    SEGSPCT 60 08/03/2013    LYMPHOPCT 25.8 04/14/2021    MONOPCT 5.3 04/14/2021    BASOPCT 0.4 04/14/2021    MONOSABS 0.65 04/14/2021    LYMPHSABS 3.18 04/14/2021    EOSABS 0.22 04/14/2021    BASOSABS 0.05 04/14/2021     CMP:    Lab Results   Component Value Date     04/16/2021    K 3.0 04/16/2021    CL 98 04/16/2021    CO2 27 04/16/2021    BUN 10 04/16/2021    CREATININE 0.9 04/16/2021    GFRAA >60 04/16/2021    LABGLOM >60 04/16/2021    GLUCOSE 99 04/16/2021    GLUCOSE 116 10/21/2011    PROT 8.0 04/14/2021    LABALBU 4.1 04/14/2021    LABALBU 4.6 10/21/2011    CALCIUM 8.9 04/16/2021    BILITOT 0.3 04/14/2021    ALKPHOS 90 04/14/2021    AST 31 04/14/2021    ALT 29 04/14/2021       /63   Pulse 82   Temp 98.4 °F (36.9 °C) (Oral)   Resp 17   Ht 5' 1\" (1.549 m)   Wt (!) 320 lb (145.2 kg)   LMP  (LMP Unknown)   SpO2 96%   BMI 60.46 kg/m²     Physical Exam  Const/Neuro- unchanged, no signs of acute distress, Alert  ENMT- Within Normal Limits, Normocephalic, mucous membranes pink/moist, No thrush  Neck: Neck supple  Heart- Regular, Rate, Rhythm- no murmur appreciated. Lungs- clear to ascultation. Respirations even and nonlabored. Abdomen- Soft, bowel sounds positive, non tender  Musculo/Extremities-  Equal and symmetrical, no edema. No tenderness. Skin:  Warm and dry, free from rashes. Cultures reviewed    Radiology reviewed  CT TIBIA FIBULA LEFT WO CONTRAST   Preliminary Result   Subcutaneous edema along the anteromedial aspect of the lower leg suggesting   cellulitis. No deep soft tissue infection, soft tissue gas or abscess. No acute bone or joint abnormality. XR TIBIA FIBULA LEFT (2 VIEWS)   Final Result   Persistent soft tissue stranding in the lateral and anterior proximal to mid   left shin. No underlying osseous abnormality seen.              Assessment  Left leg cellulitis/abscess  Surgery help appreciated  I and D  Cultures pending   WBC 8700     Plan  Continue antibiotics for now   All notes noted      Electronically signed by Chrissy Ragsdale MD on 4/16/2021 at 11:28 AM

## 2021-04-16 NOTE — CARE COORDINATION
4/16/2021 social work transition of are planning  Pt plan is home,awaiting final plan for atb. GLENDY Langley following.   Electronically signed by JENNY Espinosa on 4/16/2021 at 10:18 AM

## 2021-04-16 NOTE — PROGRESS NOTES
Pharmacy Consultation Note  (Antibiotic Dosing and Monitoring)    Initial consult date: 4/15/21  Consulting physician: Dr. Veronika Bales   Drug: Vancomycin  Indication: Cellulitis of left lower extremity     Age/  Gender Height Weight IBW Dosing weight  Allergy Information   46 y.o./female 5' 1\" (154.9 cm) (!) 320 lb (145.2 kg)     Ideal body weight: 47.8 kg (105 lb 6.1 oz)  Adjusted ideal body weight: 86.7 kg (191 lb 3.6 oz)  86.7 kg  Latex, Adhesive tape, Aspirin, Benadryl [diphenhydramine hcl], Codeine, Demerol, Famotidine, and Phenergan [promethazine hcl]      Temp (24hrs), Av.6 °F (36.4 °C), Min:97.1 °F (36.2 °C), Max:98.4 °F (36.9 °C)          Date  WBC BUN SCr CrCl  (mL/min) Drug/Dose Time   Given Level(s)   (Time) Comments    12.3 10 1 94 Vancomycin 2000 mg IV x1 2347     4/15 10.5 13 1 94 Vancomycin 1250 mg IV q12h 1223  2355      8.7 10 0.9 >100 Vancomycin 1250 mg IV q12h 1314            Vanco trough @0030          Intake/Output Summary (Last 24 hours) at 2021 1616  Last data filed at 2021 1520  Gross per 24 hour   Intake 1130 ml   Output    Net 1130 ml       Historical Cultures:  Organism   Date Value Ref Range Status   2021 Corynebacterium species (A)  Final     Recent Labs     21  2112   BC 24 Hours no growth       Cultures:  available culture and sensitivity results were reviewed in [facility]    Assessment:  · 48 y.o. female has been initiated vancomycin and Zosyn for cellulitis of left lower extremity after cutting it on the . At [facility] she was given cephalexin 500 mg TID. Wound got worse and her PCP changed the antibiotic to Augmenin 875-125 mg BID.     · ID following  · Estimated CrCl >100 mL/min  · Goal trough level = 15-20 mcg/mL    Plan:  · Continue vancomycin at a dose of 1250 mg IV q12h  · A vanco trough level will be obtained with the 1am dose tomorrow and dose will be adjusted if needed  · Pharmacist will follow and monitor/adjust dosing as necessary John Hendricks, PharmD, BCPS, BCCCP  4/16/2021  4:19 PM  Pager: [phone]

## 2021-04-16 NOTE — PROGRESS NOTES
Progress Note  Date:2021       [facility]:[id number]  Patient Brittani Soler     YOB: 1975     Age:46 y.o. Patient says leg pain is fair today. Subjective    Subjective:  Symptoms:  Stable. No shortness of breath or chest pain. Diet:  Adequate intake. Activity level: Impaired due to pain. Pain:  She complains of pain that is mild. Review of Systems   Constitutional: Negative for activity change and fever. HENT: Negative for congestion. Respiratory: Negative for shortness of breath. Cardiovascular: Negative for chest pain. Gastrointestinal: Negative for abdominal pain. Genitourinary: Negative for difficulty urinating. Skin: Positive for wound. Neurological: Negative for dizziness. Objective         Vitals Last 24 Hours:  TEMPERATURE:  Temp  Av.2 °F (36.2 °C)  Min: 96.8 °F (36 °C)  Max: 97.4 °F (36.3 °C)  RESPIRATIONS RANGE: Resp  Av  Min: 16  Max: 20  PULSE OXIMETRY RANGE: SpO2  Av.5 %  Min: 93 %  Max: 98 %  PULSE RANGE: Pulse  Av  Min: 80  Max: 88  BLOOD PRESSURE RANGE: Systolic (99FDF), BUO:784 , Min:124 , BJT:873   ; Diastolic (17XNM), HFP:56, Min:68, Max:74    I/O (24Hr): Intake/Output Summary (Last 24 hours) at 2021 0642  Last data filed at 4/15/2021 2050  Gross per 24 hour   Intake 1140 ml   Output    Net 1140 ml     Objective:  General Appearance:  Comfortable. Vital signs: (most recent): Blood pressure 126/73, pulse 83, temperature 97.3 °F (36.3 °C), temperature source Temporal, resp. rate 18, height 5' 1\" (1.549 m), weight (!) 320 lb (145.2 kg), SpO2 95 %, not currently breastfeeding. No fever. Lungs:  Normal effort and normal respiratory rate. Breath sounds clear to auscultation. Heart: Normal rate. Regular rhythm.   S1 normal.    Skin:  (LLE wound packed)    Labs/Imaging/Diagnostics    Labs:  CBC:  Recent Labs     04/14/21  2121 04/15/21  0514 21  0526   WBC 12.3* 10.5 8.7   RBC 4.46 4.33 4.02   HGB 13.5 13.0 12.3   HCT 42.3 41.1 37.9   MCV 94.8 94.9 94.3   RDW 14.6 14.6 14.4    324 295     CHEMISTRIES:  Recent Labs     04/14/21  2112 04/15/21  0514    136   K 3.4* 3.3*   CL 98 99   CO2 29 25   BUN 10 13   CREATININE 1.0 1.0   GLUCOSE 102* 103*     PT/INR:No results for input(s): PROTIME, INR in the last 72 hours. APTT:No results for input(s): APTT in the last 72 hours. LIVER PROFILE:  Recent Labs     04/14/21 2112   AST 31   ALT 29   BILITOT 0.3   ALKPHOS 90       Imaging Last 24 Hours:  Xr Tibia Fibula Left (2 Views)    Result Date: 4/14/2021  EXAMINATION: 2 XRAY VIEWS OF THE LEFT TIBIA AND FIBULA 4/14/2021 9:14 pm COMPARISON: Left tibia/fibula series from April 9, 2021 HISTORY: ORDERING SYSTEM PROVIDED HISTORY: Mid shin wound TECHNOLOGIST PROVIDED HISTORY: Reason for exam:->Mid shin wound What reading provider will be dictating this exam?->CRC FINDINGS: Radiographs of the left tibia and fibula demonstrate no fracture with preserved alignment. No abnormal periosteal elevation. No evidence of osseous destruction. Limited evaluation of the left knee and ankle joints are unremarkable. Osseous mineralization is normal. There is superficial soft tissue stranding along the anterolateral aspect of the left lower leg. No definite evidence of subcutaneous air. Persistent soft tissue stranding in the lateral and anterior proximal to mid left shin. No underlying osseous abnormality seen. Ct Tibia Fibula Left Wo Contrast    Result Date: 4/15/2021  EXAMINATION: CT OF THE LEFT TIBIA AND FIBULA WITHOUT CONTRAST 4/15/2021 12:52 pm TECHNIQUE: CT of the left tibia and fibula was performed without the administration of intravenous contrast.  Multiplanar reformatted images are provided for review. Dose modulation, iterative reconstruction, and/or weight based adjustment of the mA/kV was utilized to reduce the radiation dose to as low as reasonably achievable.  COMPARISON: None HISTORY ORDERING SYSTEM PROVIDED HISTORY: rule out abscess TECHNOLOGIST PROVIDED HISTORY: Reason for exam:->rule out abscess What reading provider will be dictating this exam?->CRC FINDINGS: Bones: No acute osseous abnormality. No osseous erosion or cortical destruction. No evidence of periostitis. No evidence of periosteal edema. Soft Tissue: There is skin thickening and subcutaneous edema along the anteromedial aspect of the lower leg suggesting cellulitis. No evidence of soft tissue gas. No fluid along the muscular fascial planes or definite intramuscular edema. No evidence of discrete drainable fluid collection to suggest an abscess. Joint:  Limited images of the knee demonstrate no joint effusion. Mild tricompartmental osteoarthritis. Limited images of the ankle demonstrate no acute abnormality. Subcutaneous edema along the anteromedial aspect of the lower leg suggesting cellulitis. No deep soft tissue infection, soft tissue gas or abscess. No acute bone or joint abnormality. Assessment//Plan           Hospital Problems           Last Modified POA    Cellulitis of left lower extremity 4/15/2021 Yes        Assessment:  (Left leg cellulitis 4/14/2021 Yes     Asthma  Hypothyroidsm  Hyperlipidemia     ). Plan:   (Vanc and zosyn per ID. Stable after I&D  Monitor labs and cultures. Continue rest of meds. ).        Electronically signed by Herb Keita MD on 4/16/21 at 6:42 AM EDT

## 2021-04-17 LAB
ANION GAP SERPL CALCULATED.3IONS-SCNC: 11 MMOL/L (ref 7–16)
BUN BLDV-MCNC: 13 MG/DL (ref 6–20)
CALCIUM SERPL-MCNC: 9.1 MG/DL (ref 8.6–10.2)
CHLORIDE BLD-SCNC: 101 MMOL/L (ref 98–107)
CO2: 31 MMOL/L (ref 22–29)
CREAT SERPL-MCNC: 1 MG/DL (ref 0.5–1)
GFR AFRICAN AMERICAN: >60
GFR NON-AFRICAN AMERICAN: 59 ML/MIN/1.73
GLUCOSE BLD-MCNC: 104 MG/DL (ref 74–99)
HCT VFR BLD CALC: 38.1 % (ref 34–48)
HEMOGLOBIN: 11.9 G/DL (ref 11.5–15.5)
MCH RBC QN AUTO: 29.9 PG (ref 26–35)
MCHC RBC AUTO-ENTMCNC: 31.2 % (ref 32–34.5)
MCV RBC AUTO: 95.7 FL (ref 80–99.9)
PDW BLD-RTO: 14.4 FL (ref 11.5–15)
PLATELET # BLD: 323 E9/L (ref 130–450)
PMV BLD AUTO: 9.7 FL (ref 7–12)
POTASSIUM SERPL-SCNC: 3.7 MMOL/L (ref 3.5–5)
RBC # BLD: 3.98 E12/L (ref 3.5–5.5)
SODIUM BLD-SCNC: 143 MMOL/L (ref 132–146)
VANCOMYCIN TROUGH: 11.3 MCG/ML (ref 5–16)
WBC # BLD: 8.4 E9/L (ref 4.5–11.5)

## 2021-04-17 PROCEDURE — 36415 COLL VENOUS BLD VENIPUNCTURE: CPT

## 2021-04-17 PROCEDURE — 1200000000 HC SEMI PRIVATE

## 2021-04-17 PROCEDURE — 2580000003 HC RX 258: Performed by: FAMILY MEDICINE

## 2021-04-17 PROCEDURE — 6370000000 HC RX 637 (ALT 250 FOR IP): Performed by: FAMILY MEDICINE

## 2021-04-17 PROCEDURE — 80202 ASSAY OF VANCOMYCIN: CPT

## 2021-04-17 PROCEDURE — 2580000003 HC RX 258

## 2021-04-17 PROCEDURE — 80048 BASIC METABOLIC PNL TOTAL CA: CPT

## 2021-04-17 PROCEDURE — 6360000002 HC RX W HCPCS: Performed by: FAMILY MEDICINE

## 2021-04-17 PROCEDURE — 85027 COMPLETE CBC AUTOMATED: CPT

## 2021-04-17 RX ORDER — SODIUM CHLORIDE 0.9 % (FLUSH) 0.9 %
SYRINGE (ML) INJECTION
Status: COMPLETED
Start: 2021-04-17 | End: 2021-04-17

## 2021-04-17 RX ORDER — ACETAMINOPHEN 325 MG/1
650 TABLET ORAL EVERY 6 HOURS PRN
Status: DISCONTINUED | OUTPATIENT
Start: 2021-04-17 | End: 2021-04-19 | Stop reason: HOSPADM

## 2021-04-17 RX ORDER — SODIUM CHLORIDE 0.9 % (FLUSH) 0.9 %
SYRINGE (ML) INJECTION
Status: COMPLETED
Start: 2021-04-17 | End: 2021-04-18

## 2021-04-17 RX ADMIN — DOCUSATE SODIUM 100 MG: 100 CAPSULE, LIQUID FILLED ORAL at 08:57

## 2021-04-17 RX ADMIN — DULOXETINE HYDROCHLORIDE 60 MG: 60 CAPSULE, DELAYED RELEASE ORAL at 08:58

## 2021-04-17 RX ADMIN — Medication: at 01:00

## 2021-04-17 RX ADMIN — VANCOMYCIN HYDROCHLORIDE 1250 MG: 10 INJECTION, POWDER, LYOPHILIZED, FOR SOLUTION INTRAVENOUS at 01:00

## 2021-04-17 RX ADMIN — TRAMADOL HYDROCHLORIDE 50 MG: 50 TABLET, COATED ORAL at 09:06

## 2021-04-17 RX ADMIN — PIPERACILLIN AND TAZOBACTAM 3375 MG: 3; .375 INJECTION, POWDER, LYOPHILIZED, FOR SOLUTION INTRAVENOUS at 01:00

## 2021-04-17 RX ADMIN — ACETAMINOPHEN 650 MG: 325 TABLET ORAL at 20:20

## 2021-04-17 RX ADMIN — MONTELUKAST SODIUM 10 MG: 10 TABLET ORAL at 08:58

## 2021-04-17 RX ADMIN — VANCOMYCIN HYDROCHLORIDE 1250 MG: 10 INJECTION, POWDER, LYOPHILIZED, FOR SOLUTION INTRAVENOUS at 14:22

## 2021-04-17 RX ADMIN — Medication 1 TABLET: at 08:57

## 2021-04-17 RX ADMIN — ENOXAPARIN SODIUM 40 MG: 40 INJECTION SUBCUTANEOUS at 08:57

## 2021-04-17 RX ADMIN — Medication 5 MG: at 20:20

## 2021-04-17 RX ADMIN — PIPERACILLIN AND TAZOBACTAM 3375 MG: 3; .375 INJECTION, POWDER, LYOPHILIZED, FOR SOLUTION INTRAVENOUS at 17:39

## 2021-04-17 RX ADMIN — LEVOTHYROXINE SODIUM 50 MCG: 0.05 TABLET ORAL at 08:58

## 2021-04-17 RX ADMIN — DOCUSATE SODIUM 100 MG: 100 CAPSULE, LIQUID FILLED ORAL at 20:20

## 2021-04-17 RX ADMIN — PIPERACILLIN AND TAZOBACTAM 3375 MG: 3; .375 INJECTION, POWDER, LYOPHILIZED, FOR SOLUTION INTRAVENOUS at 08:58

## 2021-04-17 ASSESSMENT — PAIN SCALES - GENERAL
PAINLEVEL_OUTOF10: 0
PAINLEVEL_OUTOF10: 4
PAINLEVEL_OUTOF10: 3
PAINLEVEL_OUTOF10: 8
PAINLEVEL_OUTOF10: 0
PAINLEVEL_OUTOF10: 2
PAINLEVEL_OUTOF10: 4

## 2021-04-17 ASSESSMENT — PAIN DESCRIPTION - ORIENTATION: ORIENTATION: LEFT

## 2021-04-17 ASSESSMENT — PAIN DESCRIPTION - FREQUENCY: FREQUENCY: CONTINUOUS

## 2021-04-17 ASSESSMENT — PAIN DESCRIPTION - LOCATION: LOCATION: LEG

## 2021-04-17 ASSESSMENT — PAIN DESCRIPTION - ONSET: ONSET: ON-GOING

## 2021-04-17 ASSESSMENT — PAIN DESCRIPTION - PAIN TYPE: TYPE: ACUTE PAIN

## 2021-04-17 ASSESSMENT — PAIN DESCRIPTION - DESCRIPTORS: DESCRIPTORS: ACHING;CONSTANT;DISCOMFORT

## 2021-04-17 ASSESSMENT — ENCOUNTER SYMPTOMS
ABDOMINAL PAIN: 0
SHORTNESS OF BREATH: 0

## 2021-04-17 ASSESSMENT — PAIN DESCRIPTION - PROGRESSION: CLINICAL_PROGRESSION: NOT CHANGED

## 2021-04-17 NOTE — PROGRESS NOTES
Pharmacy Consultation Note  (Antibiotic Dosing and Monitoring)    Initial consult date: 4/15/21  Consulting physician: Dr. Chet Mattson   Drug: Vancomycin  Indication: Cellulitis of left lower extremity     Age/  Gender Height Weight IBW Dosing weight  Allergy Information   46 y.o./female 5' 1\" (154.9 cm) (!) 320 lb (145.2 kg)     Ideal body weight: 47.8 kg (105 lb 6.1 oz)  Adjusted ideal body weight: 86.7 kg (191 lb 3.6 oz)  86.7 kg  Latex, Adhesive tape, Aspirin, Benadryl [diphenhydramine hcl], Codeine, Demerol, Famotidine, and Phenergan [promethazine hcl]      Temp (24hrs), Av.7 °F (36.5 °C), Min:97.4 °F (36.3 °C), Max:97.9 °F (36.6 °C)          Date  WBC BUN SCr CrCl  (mL/min) Drug/Dose Time   Given Level(s)   (Time) Comments    12.3 10 1 94 Vancomycin 2000 mg IV x1 2347     4/15 10.5 13 1 94 Vancomycin 1250 mg IV q12h 1223  2355      8.7 10 0.9 >100 Vancomycin 1250 mg IV q12h 1314      8.4 13 1 94 Vancomycin 1250 mg IV q12h 0100 Trough: 11.3 mcg/mL (0001)                                           Intake/Output Summary (Last 24 hours) at 2021 0837  Last data filed at 2021 8488  Gross per 24 hour   Intake 1750.05 ml   Output    Net 1750.05 ml       Historical Cultures:  Organism   Date Value Ref Range Status   2021 Corynebacterium species (A)  Final     Recent Labs     21  2112   BC 24 Hours no growth       Cultures:  available culture and sensitivity results were reviewed in EPIC    Assessment:  · 48 y.o. female has been initiated vancomycin and Zosyn for cellulitis of left lower extremity after cutting it on the . At [facility] she was given cephalexin 500 mg TID. Wound got worse and her PCP changed the antibiotic to Augmentin 875-125 mg BID.     · ID following  · Estimated CrCl = 94 mL/min  · Trough level from 0001 on  was 11.3 mcg/mL; estimated AUC/ERICA 486  · Goal trough level = 15-20 mcg/mL    Plan:  · Continue vancomycin at a dose of 1250 mg IV q12h  · Will recheck levels as needed  · Pharmacist will follow and monitor/adjust dosing as necessary    Yvette Dawkins, PharmD, [address]  4/17/2021  8:38 AM

## 2021-04-17 NOTE — PROGRESS NOTES
Infectious Disease  Progress Note  [facility]    Chief Complaint:  Left leg infection    Subjective: painful left leg     Scheduled Meds:   melatonin  5 mg Oral Nightly    docusate sodium  100 mg Oral BID    levothyroxine  50 mcg Oral Daily    montelukast  10 mg Oral Daily    therapeutic multivitamin-minerals  1 tablet Oral Daily    piperacillin-tazobactam  3,375 mg Intravenous Q8H    enoxaparin  40 mg Subcutaneous Daily    vancomycin  1,250 mg Intravenous Q12H    DULoxetine  60 mg Oral Daily     Continuous Infusions:  PRN Meds:ipratropium-albuterol, traMADol    Patient Vitals for the past 24 hrs:   BP Temp Temp src Pulse Resp SpO2   04/16/21 1515 134/75 97.4 °F (36.3 °C) Temporal 94 19 95 %       CBC with Differential:    Lab Results   Component Value Date    WBC 8.4 04/17/2021    RBC 3.98 04/17/2021    HGB 11.9 04/17/2021    HCT 38.1 04/17/2021     04/17/2021    MCV 95.7 04/17/2021    MCH 29.9 04/17/2021    MCHC 31.2 04/17/2021    RDW 14.4 04/17/2021    SEGSPCT 60 08/03/2013    LYMPHOPCT 25.8 04/14/2021    MONOPCT 5.3 04/14/2021    BASOPCT 0.4 04/14/2021    MONOSABS 0.65 04/14/2021    LYMPHSABS 3.18 04/14/2021    EOSABS 0.22 04/14/2021    BASOSABS 0.05 04/14/2021     CMP:    Lab Results   Component Value Date     04/17/2021    K 3.7 04/17/2021     04/17/2021    CO2 31 04/17/2021    BUN 13 04/17/2021    CREATININE 1.0 04/17/2021    GFRAA >60 04/17/2021    LABGLOM 59 04/17/2021    GLUCOSE 104 04/17/2021    GLUCOSE 116 10/21/2011    PROT 8.0 04/14/2021    LABALBU 4.1 04/14/2021    LABALBU 4.6 10/21/2011    CALCIUM 9.1 04/17/2021    BILITOT 0.3 04/14/2021    ALKPHOS 90 04/14/2021    AST 31 04/14/2021    ALT 29 04/14/2021       /75   Pulse 94   Temp 97.4 °F (36.3 °C) (Temporal)   Resp 19   Ht 5' 1\" (1.549 m)   Wt (!) 320 lb (145.2 kg)   LMP  (LMP Unknown)   SpO2 95%   BMI 60.46 kg/m²     Physical Exam  Const/Neuro- unchanged, no signs of acute distress, Alert  ENMT- Within Normal Limits, Normocephalic, mucous membranes pink/moist, No thrush  Neck: Neck supple  Heart- Regular, Rate, Rhythm- no murmur appreciated. Lungs- clear to ascultation. Respirations even and nonlabored. Abdomen- Soft, bowel sounds positive, non tender  Musculo/Extremities-  Equal and symmetrical, no edema. No tenderness. Skin:  Warm and dry, free from rashes. Cultures reviewed    Radiology reviewed  CT TIBIA FIBULA LEFT WO CONTRAST   Preliminary Result   Subcutaneous edema along the anteromedial aspect of the lower leg suggesting   cellulitis. No deep soft tissue infection, soft tissue gas or abscess. No acute bone or joint abnormality. XR TIBIA FIBULA LEFT (2 VIEWS)   Final Result   Persistent soft tissue stranding in the lateral and anterior proximal to mid   left shin. No underlying osseous abnormality seen.              Assessment  Left leg cellulitis/abscess  Surgery help appreciated  I and D  Cultures pending   WBC 8700   afebrile    Plan  Continue antibiotics for now   All notes noted  Still waiting for final cutlures       Electronically signed by Chrissy Ragsdale MD on 4/17/2021 at 7:54 AM

## 2021-04-17 NOTE — PROGRESS NOTES
Old dressing and packing removed from wound LLE, no drainage or odor noted. Wound cleansed with normal saline, iodoform packing applied and 4x4 guaze applied. Patient tolerated procedure well.

## 2021-04-17 NOTE — PROGRESS NOTES
Progress Note  Date:2021       ECBY:[id number]  Patient Liborio Rm     YOB: 1975     Age:46 y.o. Patient says leg pain is fair today. Subjective    Subjective:  Symptoms:  Stable. No shortness of breath or chest pain. Diet:  Adequate intake. Activity level: Impaired due to pain. Pain:  She complains of pain that is mild. Review of Systems   Constitutional: Negative for activity change and fever. HENT: Negative for congestion. Respiratory: Negative for shortness of breath. Cardiovascular: Negative for chest pain. Gastrointestinal: Negative for abdominal pain. Genitourinary: Negative for difficulty urinating. Skin: Positive for wound. Neurological: Negative for dizziness. Objective         Vitals Last 24 Hours:  TEMPERATURE:  Temp  Av.9 °F (36.6 °C)  Min: 97.4 °F (36.3 °C)  Max: 98.4 °F (36.9 °C)  RESPIRATIONS RANGE: Resp  Av  Min: 17  Max: 19  PULSE OXIMETRY RANGE: SpO2  Av.5 %  Min: 95 %  Max: 96 %  PULSE RANGE: Pulse  Av  Min: 82  Max: 94  BLOOD PRESSURE RANGE: Systolic (88EXB), CQI:564 , Min:130 , DAF:147   ; Diastolic (36GXJ), SEV:80, Min:63, Max:75    I/O (24Hr): Intake/Output Summary (Last 24 hours) at 2021 0706  Last data filed at 2021 0832  Gross per 24 hour   Intake 1750.05 ml   Output    Net 1750.05 ml     Objective:  General Appearance:  Comfortable. Vital signs: (most recent): Blood pressure 134/75, pulse 94, temperature 97.4 °F (36.3 °C), temperature source Temporal, resp. rate 19, height 5' 1\" (1.549 m), weight (!) 320 lb (145.2 kg), SpO2 95 %, not currently breastfeeding. No fever. Lungs:  Normal effort and normal respiratory rate. Breath sounds clear to auscultation. Heart: Normal rate. Regular rhythm.   S1 normal.    Skin:  (LLE wound packed)    Labs/Imaging/Diagnostics    Labs:  CBC:  Recent Labs     04/15/21  0514 21  0526 21  0538   WBC 10.5 8.7 8.4   RBC 4.33 4.02 3.98 HGB 13.0 12.3 11.9   HCT 41.1 37.9 38.1   MCV 94.9 94.3 95.7   RDW 14.6 14.4 14.4    295 323     CHEMISTRIES:  Recent Labs     04/15/21  0514 04/16/21  0526 04/17/21  0538    137 143   K 3.3* 3.0* 3.7   CL 99 98 101   CO2 25 27 31*   BUN 13 10 13   CREATININE 1.0 0.9 1.0   GLUCOSE 103* 99 104*     PT/INR:No results for input(s): PROTIME, INR in the last 72 hours. APTT:No results for input(s): APTT in the last 72 hours. LIVER PROFILE:  Recent Labs     04/14/21 2112   AST 31   ALT 29   BILITOT 0.3   ALKPHOS 90       Imaging Last 24 Hours:  Xr Tibia Fibula Left (2 Views)    Result Date: 4/14/2021  EXAMINATION: 2 XRAY VIEWS OF THE LEFT TIBIA AND FIBULA 4/14/2021 9:14 pm COMPARISON: Left tibia/fibula series from April 9, 2021 HISTORY: ORDERING SYSTEM PROVIDED HISTORY: Mid shin wound TECHNOLOGIST PROVIDED HISTORY: Reason for exam:->Mid shin wound What reading provider will be dictating this exam?->CRC FINDINGS: Radiographs of the left tibia and fibula demonstrate no fracture with preserved alignment. No abnormal periosteal elevation. No evidence of osseous destruction. Limited evaluation of the left knee and ankle joints are unremarkable. Osseous mineralization is normal. There is superficial soft tissue stranding along the anterolateral aspect of the left lower leg. No definite evidence of subcutaneous air. Persistent soft tissue stranding in the lateral and anterior proximal to mid left shin. No underlying osseous abnormality seen. Ct Tibia Fibula Left Wo Contrast    Result Date: 4/15/2021  EXAMINATION: CT OF THE LEFT TIBIA AND FIBULA WITHOUT CONTRAST 4/15/2021 12:52 pm TECHNIQUE: CT of the left tibia and fibula was performed without the administration of intravenous contrast.  Multiplanar reformatted images are provided for review.  Dose modulation, iterative reconstruction, and/or weight based adjustment of the mA/kV was utilized to reduce the radiation dose to as low as reasonably achievable. COMPARISON: None HISTORY ORDERING SYSTEM PROVIDED HISTORY: rule out abscess TECHNOLOGIST PROVIDED HISTORY: Reason for exam:->rule out abscess What reading provider will be dictating this exam?->CRC FINDINGS: Bones: No acute osseous abnormality. No osseous erosion or cortical destruction. No evidence of periostitis. No evidence of periosteal edema. Soft Tissue: There is skin thickening and subcutaneous edema along the anteromedial aspect of the lower leg suggesting cellulitis. No evidence of soft tissue gas. No fluid along the muscular fascial planes or definite intramuscular edema. No evidence of discrete drainable fluid collection to suggest an abscess. Joint:  Limited images of the knee demonstrate no joint effusion. Mild tricompartmental osteoarthritis. Limited images of the ankle demonstrate no acute abnormality. Subcutaneous edema along the anteromedial aspect of the lower leg suggesting cellulitis. No deep soft tissue infection, soft tissue gas or abscess. No acute bone or joint abnormality. Assessment//Plan           Hospital Problems           Last Modified POA    Cellulitis of left lower extremity 4/15/2021 Yes        Assessment:  (Left leg cellulitis 4/14/2021 Yes     Asthma  Hypothyroidsm  Hyperlipidemia     ). Plan:   (Vanc and zosyn per ID. Stable after I&D  Monitor labs and cultures. Continue rest of meds. ).

## 2021-04-17 NOTE — PROGRESS NOTES
[facility]  ATTENDING PHYSICIAN PROGRESS NOTE     I have examined the patient and  reviewed the record. I have reviewed all relevant labs and imaging data. The following summarizes my clinical findings and independent assessment. CC: LLE abscess    S. Pt underwent bedside I&D of LLE    O.  Vitals:    04/16/21 1515   BP: 134/75   Pulse: 94   Resp: 19   Temp: 97.4 °F (36.3 °C)   SpO2: 95%     NAD  Abdomen soft nt nd  LLE--cellulitis noted, left lower extremity dressed    ASSESSMENT:  Active Problems:    Cellulitis of left lower extremity  Resolved Problems:    * No resolved hospital problems. *       PLAN:   LLE hematoma  S/p I&D  Daily dressing changes  DVT Proph: SCDS/ july Herrera MD, FACS  4/16/2021  9:20 PM    NOTE: This report was transcribed using voice recognition software. Every effort was made to ensure accuracy; however, inadvertent computerized transcription errors may be present.

## 2021-04-18 LAB
ANION GAP SERPL CALCULATED.3IONS-SCNC: 14 MMOL/L (ref 7–16)
BUN BLDV-MCNC: 13 MG/DL (ref 6–20)
CALCIUM SERPL-MCNC: 9 MG/DL (ref 8.6–10.2)
CHLORIDE BLD-SCNC: 101 MMOL/L (ref 98–107)
CO2: 27 MMOL/L (ref 22–29)
CREAT SERPL-MCNC: 1 MG/DL (ref 0.5–1)
GFR AFRICAN AMERICAN: >60
GFR NON-AFRICAN AMERICAN: 59 ML/MIN/1.73
GLUCOSE BLD-MCNC: 96 MG/DL (ref 74–99)
HCT VFR BLD CALC: 37 % (ref 34–48)
HEMOGLOBIN: 11.7 G/DL (ref 11.5–15.5)
MCH RBC QN AUTO: 30.1 PG (ref 26–35)
MCHC RBC AUTO-ENTMCNC: 31.6 % (ref 32–34.5)
MCV RBC AUTO: 95.1 FL (ref 80–99.9)
PDW BLD-RTO: 14.2 FL (ref 11.5–15)
PLATELET # BLD: 309 E9/L (ref 130–450)
PMV BLD AUTO: 9.9 FL (ref 7–12)
POTASSIUM SERPL-SCNC: 3.7 MMOL/L (ref 3.5–5)
RBC # BLD: 3.89 E12/L (ref 3.5–5.5)
SODIUM BLD-SCNC: 142 MMOL/L (ref 132–146)
WBC # BLD: 6.9 E9/L (ref 4.5–11.5)
WOUND/ABSCESS: NORMAL

## 2021-04-18 PROCEDURE — 2580000003 HC RX 258: Performed by: FAMILY MEDICINE

## 2021-04-18 PROCEDURE — 85027 COMPLETE CBC AUTOMATED: CPT

## 2021-04-18 PROCEDURE — 2580000003 HC RX 258

## 2021-04-18 PROCEDURE — 02HV33Z INSERTION OF INFUSION DEVICE INTO SUPERIOR VENA CAVA, PERCUTANEOUS APPROACH: ICD-10-PCS | Performed by: FAMILY MEDICINE

## 2021-04-18 PROCEDURE — 36415 COLL VENOUS BLD VENIPUNCTURE: CPT

## 2021-04-18 PROCEDURE — 80048 BASIC METABOLIC PNL TOTAL CA: CPT

## 2021-04-18 PROCEDURE — 6370000000 HC RX 637 (ALT 250 FOR IP): Performed by: FAMILY MEDICINE

## 2021-04-18 PROCEDURE — 6360000002 HC RX W HCPCS: Performed by: INTERNAL MEDICINE

## 2021-04-18 PROCEDURE — 2580000003 HC RX 258: Performed by: INTERNAL MEDICINE

## 2021-04-18 PROCEDURE — 36569 INSJ PICC 5 YR+ W/O IMAGING: CPT

## 2021-04-18 PROCEDURE — 76937 US GUIDE VASCULAR ACCESS: CPT

## 2021-04-18 PROCEDURE — C1751 CATH, INF, PER/CENT/MIDLINE: HCPCS

## 2021-04-18 PROCEDURE — 1200000000 HC SEMI PRIVATE

## 2021-04-18 PROCEDURE — 6360000002 HC RX W HCPCS: Performed by: FAMILY MEDICINE

## 2021-04-18 RX ORDER — SODIUM CHLORIDE 9 MG/ML
25 INJECTION, SOLUTION INTRAVENOUS PRN
Status: DISCONTINUED | OUTPATIENT
Start: 2021-04-18 | End: 2021-04-19 | Stop reason: HOSPADM

## 2021-04-18 RX ORDER — HEPARIN SODIUM (PORCINE) LOCK FLUSH IV SOLN 100 UNIT/ML 100 UNIT/ML
3 SOLUTION INTRAVENOUS PRN
Status: DISCONTINUED | OUTPATIENT
Start: 2021-04-18 | End: 2021-04-19 | Stop reason: HOSPADM

## 2021-04-18 RX ORDER — SODIUM CHLORIDE 0.9 % (FLUSH) 0.9 %
5-40 SYRINGE (ML) INJECTION EVERY 12 HOURS SCHEDULED
Status: DISCONTINUED | OUTPATIENT
Start: 2021-04-18 | End: 2021-04-19 | Stop reason: HOSPADM

## 2021-04-18 RX ORDER — LIDOCAINE HYDROCHLORIDE 10 MG/ML
5 INJECTION, SOLUTION INFILTRATION; PERINEURAL ONCE
Status: DISCONTINUED | OUTPATIENT
Start: 2021-04-18 | End: 2021-04-19 | Stop reason: HOSPADM

## 2021-04-18 RX ORDER — HEPARIN SODIUM (PORCINE) LOCK FLUSH IV SOLN 100 UNIT/ML 100 UNIT/ML
3 SOLUTION INTRAVENOUS EVERY 12 HOURS SCHEDULED
Status: DISCONTINUED | OUTPATIENT
Start: 2021-04-18 | End: 2021-04-19 | Stop reason: HOSPADM

## 2021-04-18 RX ORDER — SODIUM CHLORIDE 0.9 % (FLUSH) 0.9 %
5-40 SYRINGE (ML) INJECTION PRN
Status: DISCONTINUED | OUTPATIENT
Start: 2021-04-18 | End: 2021-04-19 | Stop reason: HOSPADM

## 2021-04-18 RX ADMIN — PIPERACILLIN AND TAZOBACTAM 3375 MG: 3; .375 INJECTION, POWDER, LYOPHILIZED, FOR SOLUTION INTRAVENOUS at 16:19

## 2021-04-18 RX ADMIN — TRAMADOL HYDROCHLORIDE 50 MG: 50 TABLET, COATED ORAL at 21:05

## 2021-04-18 RX ADMIN — SODIUM CHLORIDE, PRESERVATIVE FREE 10 ML: 5 INJECTION INTRAVENOUS at 00:21

## 2021-04-18 RX ADMIN — PIPERACILLIN AND TAZOBACTAM 3375 MG: 3; .375 INJECTION, POWDER, LYOPHILIZED, FOR SOLUTION INTRAVENOUS at 23:34

## 2021-04-18 RX ADMIN — VANCOMYCIN HYDROCHLORIDE 1250 MG: 10 INJECTION, POWDER, LYOPHILIZED, FOR SOLUTION INTRAVENOUS at 13:25

## 2021-04-18 RX ADMIN — ENOXAPARIN SODIUM 40 MG: 40 INJECTION SUBCUTANEOUS at 08:46

## 2021-04-18 RX ADMIN — Medication 5 MG: at 21:05

## 2021-04-18 RX ADMIN — PIPERACILLIN AND TAZOBACTAM 3375 MG: 3; .375 INJECTION, POWDER, LYOPHILIZED, FOR SOLUTION INTRAVENOUS at 00:21

## 2021-04-18 RX ADMIN — SODIUM CHLORIDE, PRESERVATIVE FREE 10 ML: 5 INJECTION INTRAVENOUS at 20:40

## 2021-04-18 RX ADMIN — VANCOMYCIN HYDROCHLORIDE 1250 MG: 10 INJECTION, POWDER, LYOPHILIZED, FOR SOLUTION INTRAVENOUS at 00:21

## 2021-04-18 RX ADMIN — LEVOTHYROXINE SODIUM 50 MCG: 0.05 TABLET ORAL at 05:52

## 2021-04-18 RX ADMIN — DOCUSATE SODIUM 100 MG: 100 CAPSULE, LIQUID FILLED ORAL at 08:47

## 2021-04-18 RX ADMIN — VANCOMYCIN HYDROCHLORIDE 1250 MG: 10 INJECTION, POWDER, LYOPHILIZED, FOR SOLUTION INTRAVENOUS at 23:34

## 2021-04-18 RX ADMIN — DULOXETINE HYDROCHLORIDE 60 MG: 60 CAPSULE, DELAYED RELEASE ORAL at 08:47

## 2021-04-18 RX ADMIN — Medication 1 TABLET: at 08:47

## 2021-04-18 RX ADMIN — TRAMADOL HYDROCHLORIDE 50 MG: 50 TABLET, COATED ORAL at 00:24

## 2021-04-18 RX ADMIN — HEPARIN 300 UNITS: 100 SYRINGE at 20:40

## 2021-04-18 RX ADMIN — MONTELUKAST SODIUM 10 MG: 10 TABLET ORAL at 08:47

## 2021-04-18 RX ADMIN — TRAMADOL HYDROCHLORIDE 50 MG: 50 TABLET, COATED ORAL at 15:11

## 2021-04-18 RX ADMIN — PIPERACILLIN AND TAZOBACTAM 3375 MG: 3; .375 INJECTION, POWDER, LYOPHILIZED, FOR SOLUTION INTRAVENOUS at 08:47

## 2021-04-18 RX ADMIN — DOCUSATE SODIUM 100 MG: 100 CAPSULE, LIQUID FILLED ORAL at 21:05

## 2021-04-18 ASSESSMENT — PAIN DESCRIPTION - FREQUENCY
FREQUENCY: CONTINUOUS

## 2021-04-18 ASSESSMENT — PAIN DESCRIPTION - ONSET
ONSET: ON-GOING

## 2021-04-18 ASSESSMENT — PAIN DESCRIPTION - LOCATION
LOCATION: LEG

## 2021-04-18 ASSESSMENT — PAIN DESCRIPTION - ORIENTATION
ORIENTATION: LEFT

## 2021-04-18 ASSESSMENT — PAIN SCALES - GENERAL
PAINLEVEL_OUTOF10: 0
PAINLEVEL_OUTOF10: 9
PAINLEVEL_OUTOF10: 0
PAINLEVEL_OUTOF10: 4
PAINLEVEL_OUTOF10: 0
PAINLEVEL_OUTOF10: 4
PAINLEVEL_OUTOF10: 8

## 2021-04-18 ASSESSMENT — PAIN DESCRIPTION - DESCRIPTORS
DESCRIPTORS: ACHING;DISCOMFORT;THROBBING
DESCRIPTORS: ACHING;DISCOMFORT;NAGGING
DESCRIPTORS: ACHING;BURNING;THROBBING

## 2021-04-18 ASSESSMENT — PAIN DESCRIPTION - PAIN TYPE
TYPE: ACUTE PAIN
TYPE: ACUTE PAIN;SURGICAL PAIN
TYPE: ACUTE PAIN

## 2021-04-18 ASSESSMENT — PAIN DESCRIPTION - PROGRESSION
CLINICAL_PROGRESSION: NOT CHANGED
CLINICAL_PROGRESSION: NOT CHANGED

## 2021-04-18 ASSESSMENT — PAIN - FUNCTIONAL ASSESSMENT: PAIN_FUNCTIONAL_ASSESSMENT: PREVENTS OR INTERFERES SOME ACTIVE ACTIVITIES AND ADLS

## 2021-04-18 ASSESSMENT — ENCOUNTER SYMPTOMS
ABDOMINAL PAIN: 0
SHORTNESS OF BREATH: 0

## 2021-04-18 NOTE — PLAN OF CARE
Problem: Pain:  Goal: Control of acute pain  Description: Control of acute pain  Outcome: Met This Shift     Problem: Pain:  Goal: Pain level will decrease  Description: Pain level will decrease  Outcome: Ongoing  Goal: Control of chronic pain  Description: Control of chronic pain  Outcome: Ongoing     Problem: Skin Integrity:  Goal: Will show no infection signs and symptoms  Description: Will show no infection signs and symptoms  Outcome: Ongoing  Goal: Absence of new skin breakdown  Description: Absence of new skin breakdown  Outcome: Ongoing

## 2021-04-18 NOTE — PROGRESS NOTES
Vascular Access Procedure Note    Procedure Date:   4/18/2021    Pre-procedure Verification/Time-Out:  The proposed risks versus benefits of this procedure were discussed in detail by the physician with patient. written consent was obtained from the patient. Relevant documentation was reviewed prior to procedure including signed consent form and medications. All necessary equipment for procedure is available at time of procedure yes. An audible time out was done at 1400PM by team members, correctly identifying patients name, medical record number, correct side, correct site, and correct procedure to be performed with registered nurse members of the procedure team all in agreement.         Indication for Procedure:   Reason for Insertion: intravenous antibiotics    ASA Assessment (Required for Moderate & Deep Sedation):      Procedure:   Reason for Consultation: power PICC line    Clinician Performing Procedure:   Charles Ann rn    Assistant:  none    Sedation:   Analgesia Used: lidocaine 1%    Procedure Details/Findings:  Catheter [place] Size: 5.5 2 lumen  Lot Number: [id number]  Product #: [id number]  Expiration Date: 12/31/2021  Maximum Barrier Precautions: cap, eye shield, full body drape, gloves, gown, handwashing and mask  Skin Prep: chlorhexidine  Technique: modified seldinger and ultrasound guided with VPS  Attempts: 2  Exposed (cm): 0  Total (cm): 40  Placement Site: right brachial vein  Vessel Size: 0.55  Dressing: securement device, transparent dressing and biopatch  Blood Return: Yes   Ultrasound Guidance: Yes   Arm Circumference Mid-Bicep (cm): 49  Chest X-Ray Ordered: VasoNova VPS  End Placement: caj    Complications:   none     Post-operative Condition:  stable  Patient Tolerated Procedure: well     Comments/Post-operative Education:   Post Procedure Interventions: no blood pressure sign placed above bed    Mack Slay  04/18/21  3:56 PM

## 2021-04-18 NOTE — PROGRESS NOTES
Infectious Disease  Progress Note  [facility]    Chief Complaint:  Left leg infection    Subjective: painful left leg     Scheduled Meds:   melatonin  5 mg Oral Nightly    docusate sodium  100 mg Oral BID    levothyroxine  50 mcg Oral Daily    montelukast  10 mg Oral Daily    therapeutic multivitamin-minerals  1 tablet Oral Daily    piperacillin-tazobactam  3,375 mg Intravenous Q8H    enoxaparin  40 mg Subcutaneous Daily    vancomycin  1,250 mg Intravenous Q12H    DULoxetine  60 mg Oral Daily     Continuous Infusions:  PRN Meds:acetaminophen, ipratropium-albuterol, traMADol    Patient Vitals for the past 24 hrs:   BP Temp Temp src Pulse Resp SpO2   04/17/21 2020 134/67 98.6 °F (37 °C) Temporal 81 16 96 %   04/17/21 1501 130/84 98.3 °F (36.8 °C) Temporal 78 15 96 %       CBC with Differential:    Lab Results   Component Value Date    WBC 6.9 04/18/2021    RBC 3.89 04/18/2021    HGB 11.7 04/18/2021    HCT 37.0 04/18/2021     04/18/2021    MCV 95.1 04/18/2021    MCH 30.1 04/18/2021    MCHC 31.6 04/18/2021    RDW 14.2 04/18/2021    SEGSPCT 60 08/03/2013    LYMPHOPCT 25.8 04/14/2021    MONOPCT 5.3 04/14/2021    BASOPCT 0.4 04/14/2021    MONOSABS 0.65 04/14/2021    LYMPHSABS 3.18 04/14/2021    EOSABS 0.22 04/14/2021    BASOSABS 0.05 04/14/2021     CMP:    Lab Results   Component Value Date     04/18/2021    K 3.7 04/18/2021     04/18/2021    CO2 27 04/18/2021    BUN 13 04/18/2021    CREATININE 1.0 04/18/2021    GFRAA >60 04/18/2021    LABGLOM 59 04/18/2021    GLUCOSE 96 04/18/2021    GLUCOSE 116 10/21/2011    PROT 8.0 04/14/2021    LABALBU 4.1 04/14/2021    LABALBU 4.6 10/21/2011    CALCIUM 9.0 04/18/2021    BILITOT 0.3 04/14/2021    ALKPHOS 90 04/14/2021    AST 31 04/14/2021    ALT 29 04/14/2021       /67   Pulse 81   Temp 98.6 °F (37 °C) (Temporal)   Resp 16   Ht 5' 1\" (1.549 m)   Wt (!) 320 lb (145.2 kg)   LMP  (LMP Unknown)   SpO2 96%   BMI 60.46 kg/m²     Physical Exam Const/Neuro- unchanged, no signs of acute distress, Alert  ENMT- Within Normal Limits, Normocephalic, mucous membranes pink/moist, No thrush  Neck: Neck supple  Heart- Regular, Rate, Rhythm- no murmur appreciated. Lungs- clear to ascultation. Respirations even and nonlabored. Abdomen- Soft, bowel sounds positive, non tender  Musculo/Extremities-  Equal and symmetrical, no edema. No tenderness. Skin:  Warm and dry, free from rashes. Cultures reviewed    Radiology reviewed  CT TIBIA FIBULA LEFT WO CONTRAST   Preliminary Result   Subcutaneous edema along the anteromedial aspect of the lower leg suggesting   cellulitis. No deep soft tissue infection, soft tissue gas or abscess. No acute bone or joint abnormality. XR TIBIA FIBULA LEFT (2 VIEWS)   Final Result   Persistent soft tissue stranding in the lateral and anterior proximal to mid   left shin. No underlying osseous abnormality seen. Assessment  Left leg cellulitis/abscess  Surgery help appreciated  I and D  Cultures pending   WBC 8700   Afebrile  I am going to talk to wound care   ?  More debriding  One colony of corynebacteria    Plan  Continue antibiotics for now   All notes noted        Electronically signed by Terese Darnell MD on 4/18/2021 at 8:19 AM

## 2021-04-18 NOTE — PROGRESS NOTES
Progress Note  Date:2021       LLEM:[id number]  Patient Tarik Silva     YOB: 1975     Age:46 y.o. Patient says leg pain is fair today. Subjective    Subjective:  Symptoms:  Stable. No shortness of breath or chest pain. Diet:  Adequate intake. Activity level: Impaired due to pain. Pain:  She complains of pain that is mild. Review of Systems   Constitutional: Negative for activity change and fever. HENT: Negative for congestion. Respiratory: Negative for shortness of breath. Cardiovascular: Negative for chest pain. Gastrointestinal: Negative for abdominal pain. Genitourinary: Negative for difficulty urinating. Skin: Positive for wound. Neurological: Negative for dizziness. Objective         Vitals Last 24 Hours:  TEMPERATURE:  Temp  Av.3 °F (36.8 °C)  Min: 97.9 °F (36.6 °C)  Max: 98.6 °F (37 °C)  RESPIRATIONS RANGE: Resp  Av.3  Min: 15  Max: 18  PULSE OXIMETRY RANGE: SpO2  Av.3 %  Min: 96 %  Max: 97 %  PULSE RANGE: Pulse  Av.3  Min: 73  Max: 81  BLOOD PRESSURE RANGE: Systolic (11MER), HYQ:462 , Min:129 , VBQ:539   ; Diastolic (01GQV), XDV:23, Min:64, Max:84    I/O (24Hr): Intake/Output Summary (Last 24 hours) at 2021 0708  Last data filed at 2021 0546  Gross per 24 hour   Intake 1030 ml   Output    Net 1030 ml     Objective:  General Appearance:  Comfortable. Vital signs: (most recent): Blood pressure 134/67, pulse 81, temperature 98.6 °F (37 °C), temperature source Temporal, resp. rate 16, height 5' 1\" (1.549 m), weight (!) 320 lb (145.2 kg), SpO2 96 %, not currently breastfeeding. No fever. Lungs:  Normal effort and normal respiratory rate. Breath sounds clear to auscultation. Heart: Normal rate. Regular rhythm.   S1 normal.    Skin:  (LLE wound packed)    Labs/Imaging/Diagnostics    Labs:  CBC:  Recent Labs     21  0526 21  0538 21  0536   WBC 8.7 8.4 6.9   RBC 4.02 3.98 3.89   HGB 12.3 11.9 11.7   HCT 37.9 38.1 37.0   MCV 94.3 95.7 95.1   RDW 14.4 14.4 14.2    323 309     CHEMISTRIES:  Recent Labs     04/16/21  0526 04/17/21  0538    143   K 3.0* 3.7   CL 98 101   CO2 27 31*   BUN 10 13   CREATININE 0.9 1.0   GLUCOSE 99 104*     PT/INR:No results for input(s): PROTIME, INR in the last 72 hours. APTT:No results for input(s): APTT in the last 72 hours. LIVER PROFILE:  No results for input(s): AST, ALT, BILIDIR, BILITOT, ALKPHOS in the last 72 hours. Imaging Last 24 Hours:  Xr Tibia Fibula Left (2 Views)    Result Date: 4/14/2021  EXAMINATION: 2 XRAY VIEWS OF THE LEFT TIBIA AND FIBULA 4/14/2021 9:14 pm COMPARISON: Left tibia/fibula series from April 9, 2021 HISTORY: ORDERING SYSTEM PROVIDED HISTORY: Mid shin wound TECHNOLOGIST PROVIDED HISTORY: Reason for exam:->Mid shin wound What reading provider will be dictating this exam?->CRC FINDINGS: Radiographs of the left tibia and fibula demonstrate no fracture with preserved alignment. No abnormal periosteal elevation. No evidence of osseous destruction. Limited evaluation of the left knee and ankle joints are unremarkable. Osseous mineralization is normal. There is superficial soft tissue stranding along the anterolateral aspect of the left lower leg. No definite evidence of subcutaneous air. Persistent soft tissue stranding in the lateral and anterior proximal to mid left shin. No underlying osseous abnormality seen. Ct Tibia Fibula Left Wo Contrast    Result Date: 4/15/2021  EXAMINATION: CT OF THE LEFT TIBIA AND FIBULA WITHOUT CONTRAST 4/15/2021 12:52 pm TECHNIQUE: CT of the left tibia and fibula was performed without the administration of intravenous contrast.  Multiplanar reformatted images are provided for review. Dose modulation, iterative reconstruction, and/or weight based adjustment of the mA/kV was utilized to reduce the radiation dose to as low as reasonably achievable.  COMPARISON: None HISTORY ORDERING SYSTEM PROVIDED HISTORY: rule out abscess TECHNOLOGIST PROVIDED HISTORY: Reason for exam:->rule out abscess What reading provider will be dictating this exam?->CRC FINDINGS: Bones: No acute osseous abnormality. No osseous erosion or cortical destruction. No evidence of periostitis. No evidence of periosteal edema. Soft Tissue: There is skin thickening and subcutaneous edema along the anteromedial aspect of the lower leg suggesting cellulitis. No evidence of soft tissue gas. No fluid along the muscular fascial planes or definite intramuscular edema. No evidence of discrete drainable fluid collection to suggest an abscess. Joint:  Limited images of the knee demonstrate no joint effusion. Mild tricompartmental osteoarthritis. Limited images of the ankle demonstrate no acute abnormality. Subcutaneous edema along the anteromedial aspect of the lower leg suggesting cellulitis. No deep soft tissue infection, soft tissue gas or abscess. No acute bone or joint abnormality. Assessment//Plan           Hospital Problems           Last Modified POA    Cellulitis of left lower extremity 4/15/2021 Yes        Assessment:  (Left leg cellulitis 4/14/2021 Yes     Asthma  Hypothyroidsm  Hyperlipidemia     ). Plan:   (Vanc and zosyn per ID. Stable after I&D  Monitor labs and cultures. Continue rest of meds. ).

## 2021-04-19 VITALS
HEIGHT: 61 IN | TEMPERATURE: 97.8 F | DIASTOLIC BLOOD PRESSURE: 85 MMHG | HEART RATE: 79 BPM | RESPIRATION RATE: 18 BRPM | SYSTOLIC BLOOD PRESSURE: 165 MMHG | BODY MASS INDEX: 55.32 KG/M2 | OXYGEN SATURATION: 90 % | WEIGHT: 293 LBS

## 2021-04-19 LAB
BLOOD CULTURE, ROUTINE: NORMAL
CULTURE, BLOOD 2: NORMAL

## 2021-04-19 PROCEDURE — 2580000003 HC RX 258: Performed by: FAMILY MEDICINE

## 2021-04-19 PROCEDURE — 6370000000 HC RX 637 (ALT 250 FOR IP): Performed by: FAMILY MEDICINE

## 2021-04-19 PROCEDURE — 6360000002 HC RX W HCPCS: Performed by: FAMILY MEDICINE

## 2021-04-19 PROCEDURE — 6360000002 HC RX W HCPCS: Performed by: INTERNAL MEDICINE

## 2021-04-19 PROCEDURE — 2580000003 HC RX 258: Performed by: INTERNAL MEDICINE

## 2021-04-19 RX ORDER — MECOBALAMIN 5000 MCG
5 TABLET,DISINTEGRATING ORAL NIGHTLY
Qty: 30 TABLET | Refills: 0 | Status: SHIPPED | OUTPATIENT
Start: 2021-04-19 | End: 2021-08-23

## 2021-04-19 RX ORDER — DULOXETIN HYDROCHLORIDE 60 MG/1
60 CAPSULE, DELAYED RELEASE ORAL DAILY
Qty: 30 CAPSULE | Refills: 3 | Status: SHIPPED | OUTPATIENT
Start: 2021-04-20 | End: 2021-08-23

## 2021-04-19 RX ADMIN — MONTELUKAST SODIUM 10 MG: 10 TABLET ORAL at 08:05

## 2021-04-19 RX ADMIN — LEVOTHYROXINE SODIUM 50 MCG: 0.05 TABLET ORAL at 06:50

## 2021-04-19 RX ADMIN — SODIUM CHLORIDE, PRESERVATIVE FREE 10 ML: 5 INJECTION INTRAVENOUS at 08:02

## 2021-04-19 RX ADMIN — VANCOMYCIN HYDROCHLORIDE 1250 MG: 10 INJECTION, POWDER, LYOPHILIZED, FOR SOLUTION INTRAVENOUS at 13:38

## 2021-04-19 RX ADMIN — TRAMADOL HYDROCHLORIDE 50 MG: 50 TABLET, COATED ORAL at 13:05

## 2021-04-19 RX ADMIN — DOCUSATE SODIUM 100 MG: 100 CAPSULE, LIQUID FILLED ORAL at 08:09

## 2021-04-19 RX ADMIN — Medication 1 TABLET: at 08:05

## 2021-04-19 RX ADMIN — HEPARIN 300 UNITS: 100 SYRINGE at 08:04

## 2021-04-19 RX ADMIN — DULOXETINE HYDROCHLORIDE 60 MG: 60 CAPSULE, DELAYED RELEASE ORAL at 08:04

## 2021-04-19 RX ADMIN — PIPERACILLIN AND TAZOBACTAM 3375 MG: 3; .375 INJECTION, POWDER, LYOPHILIZED, FOR SOLUTION INTRAVENOUS at 08:02

## 2021-04-19 RX ADMIN — ENOXAPARIN SODIUM 40 MG: 40 INJECTION SUBCUTANEOUS at 08:07

## 2021-04-19 ASSESSMENT — PAIN SCALES - GENERAL
PAINLEVEL_OUTOF10: 0
PAINLEVEL_OUTOF10: 2
PAINLEVEL_OUTOF10: 4
PAINLEVEL_OUTOF10: 0

## 2021-04-19 ASSESSMENT — PAIN DESCRIPTION - DESCRIPTORS: DESCRIPTORS: ACHING;THROBBING

## 2021-04-19 ASSESSMENT — PAIN DESCRIPTION - LOCATION: LOCATION: LEG

## 2021-04-19 ASSESSMENT — ENCOUNTER SYMPTOMS
ABDOMINAL PAIN: 0
SHORTNESS OF BREATH: 0

## 2021-04-19 ASSESSMENT — PAIN DESCRIPTION - PAIN TYPE: TYPE: ACUTE PAIN

## 2021-04-19 ASSESSMENT — PAIN DESCRIPTION - ORIENTATION: ORIENTATION: LEFT

## 2021-04-19 NOTE — CARE COORDINATION
4/19/2021 social work transition of care planning  Pt plan remains for [facility], [facility] following. Awaiting Final atb plan.   Electronically signed by JENNY Lackey on 4/19/2021 at 9:46 AM

## 2021-04-19 NOTE — PROGRESS NOTES
Progress Note  Date:2021       HQXE:[id number]  Patient Candi Leal     YOB: 1975     Age:46 y.o. Patient says leg pain is fair today. Subjective    Subjective:  Symptoms:  Stable. No shortness of breath or chest pain. Diet:  Adequate intake. Activity level: Impaired due to pain. Pain:  She complains of pain that is mild. Review of Systems   Constitutional: Negative for activity change and fever. HENT: Negative for congestion. Respiratory: Negative for shortness of breath. Cardiovascular: Negative for chest pain. Gastrointestinal: Negative for abdominal pain. Genitourinary: Negative for difficulty urinating. Skin: Positive for wound. Neurological: Negative for dizziness. Objective         Vitals Last 24 Hours:  TEMPERATURE:  Temp  Av.8 °F (36.6 °C)  Min: 97.5 °F (36.4 °C)  Max: 98.2 °F (36.8 °C)  RESPIRATIONS RANGE: Resp  Av.5  Min: 16  Max: 17  PULSE OXIMETRY RANGE: SpO2  Av.5 %  Min: 94 %  Max: 99 %  PULSE RANGE: Pulse  Av.5  Min: 64  Max: 88  BLOOD PRESSURE RANGE: Systolic (16ZWB), UFS:184 , Min:133 , VOI:848   ; Diastolic (50GGS), JRA:56, Min:70, Max:96    I/O (24Hr): Intake/Output Summary (Last 24 hours) at 2021 0658  Last data filed at 2021 2250  Gross per 24 hour   Intake 760 ml   Output    Net 760 ml     Objective:  General Appearance:  Comfortable. Vital signs: (most recent): Blood pressure (!) 150/78, pulse 74, temperature 97.5 °F (36.4 °C), resp. rate 17, height 5' 1\" (1.549 m), weight (!) 320 lb (145.2 kg), SpO2 99 %, not currently breastfeeding. No fever. Lungs:  Normal effort and normal respiratory rate. Breath sounds clear to auscultation. Heart: Normal rate. Regular rhythm.   S1 normal.    Skin:  (LLE wound packed)    Labs/Imaging/Diagnostics    Labs:  CBC:  Recent Labs     21  0538 21  0536   WBC 8.4 6.9   RBC 3.98 3.89   HGB 11.9 11.7   HCT 38.1 37.0   MCV 95.7 95.1   RDW 14.4 14.2    309     CHEMISTRIES:  Recent Labs     04/17/21  0538 04/18/21  0536    142   K 3.7 3.7    101   CO2 31* 27   BUN 13 13   CREATININE 1.0 1.0   GLUCOSE 104* 96     PT/INR:No results for input(s): PROTIME, INR in the last 72 hours. APTT:No results for input(s): APTT in the last 72 hours. LIVER PROFILE:  No results for input(s): AST, ALT, BILIDIR, BILITOT, ALKPHOS in the last 72 hours. Imaging Last 24 Hours:  Xr Tibia Fibula Left (2 Views)    Result Date: 4/14/2021  EXAMINATION: 2 XRAY VIEWS OF THE LEFT TIBIA AND FIBULA 4/14/2021 9:14 pm COMPARISON: Left tibia/fibula series from April 9, 2021 HISTORY: ORDERING SYSTEM PROVIDED HISTORY: Mid shin wound TECHNOLOGIST PROVIDED HISTORY: Reason for exam:->Mid shin wound What reading provider will be dictating this exam?->CRC FINDINGS: Radiographs of the left tibia and fibula demonstrate no fracture with preserved alignment. No abnormal periosteal elevation. No evidence of osseous destruction. Limited evaluation of the left knee and ankle joints are unremarkable. Osseous mineralization is normal. There is superficial soft tissue stranding along the anterolateral aspect of the left lower leg. No definite evidence of subcutaneous air. Persistent soft tissue stranding in the lateral and anterior proximal to mid left shin. No underlying osseous abnormality seen. Ct Tibia Fibula Left Wo Contrast    Result Date: 4/15/2021  EXAMINATION: CT OF THE LEFT TIBIA AND FIBULA WITHOUT CONTRAST 4/15/2021 12:52 pm TECHNIQUE: CT of the left tibia and fibula was performed without the administration of intravenous contrast.  Multiplanar reformatted images are provided for review. Dose modulation, iterative reconstruction, and/or weight based adjustment of the mA/kV was utilized to reduce the radiation dose to as low as reasonably achievable.  COMPARISON: None HISTORY ORDERING SYSTEM PROVIDED HISTORY: rule out abscess TECHNOLOGIST PROVIDED HISTORY: Reason for exam:->rule out abscess What reading provider will be dictating this exam?->CRC FINDINGS: Bones: No acute osseous abnormality. No osseous erosion or cortical destruction. No evidence of periostitis. No evidence of periosteal edema. Soft Tissue: There is skin thickening and subcutaneous edema along the anteromedial aspect of the lower leg suggesting cellulitis. No evidence of soft tissue gas. No fluid along the muscular fascial planes or definite intramuscular edema. No evidence of discrete drainable fluid collection to suggest an abscess. Joint:  Limited images of the knee demonstrate no joint effusion. Mild tricompartmental osteoarthritis. Limited images of the ankle demonstrate no acute abnormality. Subcutaneous edema along the anteromedial aspect of the lower leg suggesting cellulitis. No deep soft tissue infection, soft tissue gas or abscess. No acute bone or joint abnormality. Assessment//Plan           Hospital Problems           Last Modified POA    Cellulitis of left lower extremity 4/15/2021 Yes        Assessment:  (Left leg cellulitis 4/14/2021 Yes     Asthma  Hypothyroidsm  Hyperlipidemia     ). Plan:   (Vanc and zosyn per ID. PICC line placed  Monitor labs and cultures. Continue rest of meds. ).

## 2021-04-19 NOTE — CARE COORDINATION
4/19/2021 social work transition of care planning  Sw followed up with pt plan remains for home. Sw notified [facility] of possible discharge today. If pt able to discharge today Will need afternoon dose of atb here and [facility] can start Iv therapy schedule in the morning. Will need [facility] orders for wound care and iv therapy. Rx faxed to [facility] x [phone].   Electronically signed by JENNY Mckinney on 4/19/2021 at 12:15 PM

## 2021-04-19 NOTE — PLAN OF CARE
Problem: Pain:  Goal: Pain level will decrease  Description: Pain level will decrease  4/19/2021 1612 by Matthew Carpenter  Outcome: Met This Shift  4/19/2021 1011 by Matthew Carpenter  Outcome: Met This Shift  Goal: Control of acute pain  Description: Control of acute pain  4/19/2021 1612 by Matthew Carpenter  Outcome: Met This Shift  4/19/2021 1011 by Matthew Carpenter  Outcome: Met This Shift  Goal: Control of chronic pain  Description: Control of chronic pain  4/19/2021 1612 by Matthew Carpenter  Outcome: Met This Shift  4/19/2021 1011 by Matthew Carpenter  Outcome: Met This Shift     Problem: Skin Integrity:  Goal: Will show no infection signs and symptoms  Description: Will show no infection signs and symptoms  4/19/2021 1612 by Matthew Carpenter  Outcome: Met This Shift  4/19/2021 1011 by Matthew Carpenter  Outcome: Met This Shift  Goal: Absence of new skin breakdown  Description: Absence of new skin breakdown  4/19/2021 1612 by Matthew Carpenter  Outcome: Met This Shift  4/19/2021 1011 by Matthew Carpenter  Outcome: Met This Shift     Problem: Falls - Risk of:  Goal: Will remain free from falls  Description: Will remain free from falls  4/19/2021 1612 by Matthew Carpenter  Outcome: Met This Shift  4/19/2021 1011 by Matthew Carpenter  Outcome: Met This Shift  Goal: Absence of physical injury  Description: Absence of physical injury  4/19/2021 1612 by Matthew Carpenter  Outcome: Met This Shift  4/19/2021 1011 by Matthew Carpenter  Outcome: Met This Shift

## 2021-04-19 NOTE — PROGRESS NOTES
Infectious Disease  Progress Note  [facility]    Chief Complaint:  Left leg infection    Subjective: painful left leg     Scheduled Meds:   lidocaine  5 mL Intradermal Once    sodium chloride flush  5-40 mL Intravenous 2 times per day    heparin flush  3 mL Intravenous 2 times per day    melatonin  5 mg Oral Nightly    docusate sodium  100 mg Oral BID    levothyroxine  50 mcg Oral Daily    montelukast  10 mg Oral Daily    therapeutic multivitamin-minerals  1 tablet Oral Daily    piperacillin-tazobactam  3,375 mg Intravenous Q8H    enoxaparin  40 mg Subcutaneous Daily    vancomycin  1,250 mg Intravenous Q12H    DULoxetine  60 mg Oral Daily     Continuous Infusions:   sodium chloride       PRN Meds:sodium chloride flush, sodium chloride, heparin flush, acetaminophen, ipratropium-albuterol, traMADol    Patient Vitals for the past 24 hrs:   BP Temp Temp src Pulse Resp SpO2   04/19/21 0745 131/62 97.8 °F (36.6 °C) Temporal 75 18 97 %   04/19/21 0009 (!) 150/78 97.5 °F (36.4 °C)  74 17 99 %   04/18/21 2100 (!) 133/96 98.2 °F (36.8 °C) Temporal 84 17 94 %   04/18/21 1530 134/88 97.8 °F (36.6 °C) Temporal 88 16 97 %       CBC with Differential:    Lab Results   Component Value Date    WBC 6.9 04/18/2021    RBC 3.89 04/18/2021    HGB 11.7 04/18/2021    HCT 37.0 04/18/2021     04/18/2021    MCV 95.1 04/18/2021    MCH 30.1 04/18/2021    MCHC 31.6 04/18/2021    RDW 14.2 04/18/2021    SEGSPCT 60 08/03/2013    LYMPHOPCT 25.8 04/14/2021    MONOPCT 5.3 04/14/2021    BASOPCT 0.4 04/14/2021    MONOSABS 0.65 04/14/2021    LYMPHSABS 3.18 04/14/2021    EOSABS 0.22 04/14/2021    BASOSABS 0.05 04/14/2021     CMP:    Lab Results   Component Value Date     04/18/2021    K 3.7 04/18/2021     04/18/2021    CO2 27 04/18/2021    BUN 13 04/18/2021    CREATININE 1.0 04/18/2021    GFRAA >60 04/18/2021    LABGLOM 59 04/18/2021    GLUCOSE 96 04/18/2021    GLUCOSE 116 10/21/2011    PROT 8.0 04/14/2021    LABALBU 4.1 04/14/2021    LABALBU 4.6 10/21/2011    CALCIUM 9.0 04/18/2021    BILITOT 0.3 04/14/2021    ALKPHOS 90 04/14/2021    AST 31 04/14/2021    ALT 29 04/14/2021       /62   Pulse 75   Temp 97.8 °F (36.6 °C) (Temporal)   Resp 18   Ht 5' 1\" (1.549 m)   Wt (!) 320 lb (145.2 kg)   LMP  (LMP Unknown)   SpO2 97%   BMI 60.46 kg/m²     Physical Exam  Const/Neuro- unchanged, no signs of acute distress, Alert  ENMT- Within Normal Limits, Normocephalic, mucous membranes pink/moist, No thrush  Neck: Neck supple  Heart- Regular, Rate, Rhythm- no murmur appreciated. Lungs- clear to ascultation. Respirations even and nonlabored. Abdomen- Soft, bowel sounds positive, non tender  Musculo/Extremities-  Equal and symmetrical, no edema. No tenderness. Skin:  Warm and dry, free from rashes. Cultures reviewed    Radiology reviewed  CT TIBIA FIBULA LEFT WO CONTRAST   Preliminary Result   Subcutaneous edema along the anteromedial aspect of the lower leg suggesting   cellulitis. No deep soft tissue infection, soft tissue gas or abscess. No acute bone or joint abnormality. XR TIBIA FIBULA LEFT (2 VIEWS)   Final Result   Persistent soft tissue stranding in the lateral and anterior proximal to mid   left shin. No underlying osseous abnormality seen. Assessment  Left leg cellulitis/abscess  Surgery help appreciated  I and D  Cultures pending   WBC 8700   Afebrile  I am going to talk to wound care   ?  More debriding  One colony of corynebacteria    Plan  Continue antibiotics for now  Will reconsult surgery to be sure no more debriding needed  picc line in place  Will reconcile 14 days of vancomycin  All notes noted        Electronically signed by Kevin Go MD on 4/19/2021 at 11:52 AM

## 2021-04-19 NOTE — DISCHARGE SUMMARY
Discharge Summary    Date: 4/19/2021  Patient Name: Kerrin Eisenmenger YOB: 1975 Age: 48 y.o. Admit Date: 4/14/2021  Discharge Date: 4/19/2021  Discharge Condition: Stable    Admission Diagnosis  Left leg cellulitis (L03.116)     Discharge Diagnosis  Active Problems: Cellulitis of left lower extremityResolved Problems: * No resolved hospital problems. [facility] Stay  Narrative of Hospital Course:  Patient admitted for left lower ext cellulitis after failing oral Ab. Patient on vancc and zosyn per ID. Surgery did I&D  Dicharged wih PICC on vanc. Consultants:  [facility] CONSULT TO PHARMACYIP CONSULT TO INFECTIOUS DISEASESIP CONSULT TO GENERAL SURGERYIP CONSULT TO GENERAL SURGERY    Surgeries/procedures Performed:       Treatments:           Discharge Plan/Disposition:  Home    Hospital/Incidental Findings Requiring Follow Up:    Patient Instructions:    Diet: Cardiac Diet    Activity:Activity as Tolerated  For number of days (if applicable): Other Instructions:    Provider Follow-Up:   No follow-ups on file.      Significant Diagnostic Studies:    Recent Labs:  Admission on 04/14/2021WBC                                         Date: 04/14/2021Value: 12.3*       Ref range: 4.5 - 11.5 E9/L    Status: FinalRBC                                           Date: 04/14/2021Value: 4.46        Ref range: 3.50 - 5.50 E12/L  Status: FinalHemoglobin                                    Date: 04/14/2021Value: 13.5        Ref range: 11.5 - 15.5 g/dL   Status: FinalHematocrit                                    Date: 04/14/2021Value: 42.3        Ref range: 34.0 - 48.0 %      Status: FinalMCV                                           Date: 04/14/2021Value: 94.8        Ref range: 80.0 - 99.9 fL     Status: [address]                                           Date: 04/14/2021Value: 30.3        Ref range: 26.0 - 35.0 pg     Status: [address] [facility]                                          Date: Ref range: 35 - 104 U/L       Status: FinalALT                                           Date: 04/14/2021Value: 29          Ref range: 0 - 32 U/L         Status: FinalAST                                           Date: 04/14/2021Value: 31          Ref range: 0 - 31 U/L         Status: FinalLactic Acid                                   Date: 04/14/2021Value: 2.2         Ref range: 0.5 - 2.2 mmol/L   Status: FinalBlood Culture, Routine                        Date: 04/14/2021Value: 24 Hours no growth                     Status: PreliminaryCulture, Blood 2                              Date: 04/14/2021Value: 24 Hours no growth                     Status: PreliminaryOrganism                                      Date: 04/14/2021Value: Corynebacterium species                     Status: FinalWOUND/ABSCESS                                 Date: 04/14/2021Value: One colony    Status: FinalSed Rate                                      Date: 04/14/2021Value: 40*         Ref range: 0 - 20 mm/Hr       Status: FinalASO                                           Date: 04/14/2021Value: 24          Ref range: 0 - 200 IU/mL      Status: Final              Comment: EXPECTED ASO VALUESNormal adults:  <200 IU/mLPediatrics:     <150 IU/mLWBC                                           Date: 04/15/2021Value: 10.5        Ref range: 4.5 - 11.5 E9/L    Status: FinalRBC                                           Date: 04/15/2021Value: 4.33        Ref range: 3.50 - 5.50 E12/L  Status: FinalHemoglobin                                    Date: 04/15/2021Value: 13.0        Ref range: 11.5 - 15.5 g/dL   Status: FinalHematocrit                                    Date: 04/15/2021Value: 41.1        Ref range: 34.0 - 48.0 %      Status: FinalMCV                                           Date: 04/15/2021Value: 94.9        Ref range: 80.0 - 99.9 fL     Status: [address]                                           Date: 04/15/2021Value: 30.0        Ref range: 26.0 - 35.0 pg     Status: [address]                                          Date: 04/15/2021Value: 31.6*       Ref range: 32.0 - 34.5 %      Status: FinalRDW                                           Date: 04/15/2021Value: 14.6        Ref range: 11.5 - 15.0 fL     Status: FinalPlatelets                                     Date: 04/15/2021Value: 324         Ref range: 130 - 450 E9/L     Status: FinalMPV                                           Date: 04/15/2021Value: 9.6         Ref range: 7.0 - 12.0 fL      Status: FinalSodium                                        Date: 04/15/2021Value: 136         Ref range: 132 - 146 mmol/L   Status: FinalPotassium                                     Date: 04/15/2021Value: 3.3*        Ref range: 3.5 - 5.0 mmol/L   Status: FinalChloride                                      Date: 04/15/2021Value: 99          Ref range: 98 - 107 mmol/L    Status: FinalCO2                                           Date: 04/15/2021Value: 25          Ref range: 22 - 29 mmol/L     Status: FinalAnion Gap                                     Date: 04/15/2021Value: 12          Ref range: 7 - 16 mmol/L      Status: FinalGlucose                                       Date: 04/15/2021Value: 103*        Ref range: 74 - 99 mg/dL      Status: FinalBUN                                           Date: 04/15/2021Value: 13          Ref range: 6 - 20 mg/dL       Status: FinalCREATININE                                    Date: 04/15/2021Value: 1.0         Ref range: 0.5 - 1.0 mg/dL    Status: FinalGFR Non-                      Date: 04/15/2021Value: 59          Ref range: >=60 mL/min/1.73   Status: Final              Comment: Chronic Kidney Disease: less than 60 ml/min/1.73 sq.m. Kidney Failure: less than 15 ml/min/1.73 sq. m. Results valid for patients 18 years and older. GFR                           Date: 04/15/2021Value: >60           Status: Radha Rodriguez Date: 04/15/2021Value: 9.2         Ref range: 8.6 - 10.2 mg/dL   Status: FinalSed Rate                                      Date: 04/15/2021Value: 51*         Ref range: 0 - 20 mm/Hr       Status: FinalCRP                                           Date: 04/15/2021Value: 5.5*        Ref range: 0.0 - 0.4 mg/dL    Status: [address]                                           Date: 04/16/2021Value: 8.7         Ref range: 4.5 - 11.5 E9/L    Status: FinalRBC                                           Date: 04/16/2021Value: 4.02        Ref range: 3.50 - 5.50 E12/L  Status: FinalHemoglobin                                    Date: 04/16/2021Value: 12.3        Ref range: 11.5 - 15.5 g/dL   Status: FinalHematocrit                                    Date: 04/16/2021Value: 37.9        Ref range: 34.0 - 48.0 %      Status: FinalMCV                                           Date: 04/16/2021Value: 94.3        Ref range: 80.0 - 99.9 fL     Status: [address]                                           Date: 04/16/2021Value: 30.6        Ref range: 26.0 - 35.0 pg     Status: [address]                                          Date: 04/16/2021Value: 32.5        Ref range: 32.0 - 34.5 %      Status: FinalRDW                                           Date: 04/16/2021Value: 14.4        Ref range: 11.5 - 15.0 fL     Status: FinalPlatelets                                     Date: 04/16/2021Value: 295         Ref range: 130 - 450 E9/L     Status: FinalMPV                                           Date: 04/16/2021Value: 9.8         Ref range: 7.0 - 12.0 fL      Status: FinalSodium                                        Date: 04/16/2021Value: 137         Ref range: 132 - 146 mmol/L   Status: FinalPotassium                                     Date: 04/16/2021Value: 3.0*        Ref range: 3.5 - 5.0 mmol/L   Status: FinalChloride                                      Date: 04/16/2021Value: 98          Ref range: 98 - 107 mmol/L    Status: [facility] Date: 04/16/2021Value: 27          Ref range: 22 - 29 mmol/L     Status: FinalAnion Gap                                     Date: 04/16/2021Value: 12          Ref range: 7 - 16 mmol/L      Status: Corrected              Comment: Corrected result; previously reported as 13 on 04/16/2021 at 07:03 by I/AUTGlucose                                       Date: 04/16/2021Value: 99          Ref range: 74 - 99 mg/dL      Status: FinalBUN                                           Date: 04/16/2021Value: 10          Ref range: 6 - 20 mg/dL       Status: FinalCREATININE                                    Date: 04/16/2021Value: 0.9         Ref range: 0.5 - 1.0 mg/dL    Status: FinalGFR Non-                      Date: 04/16/2021Value: >60         Ref range: >=60 mL/min/1.73   Status: Final              Comment: Chronic Kidney Disease: less than 60 ml/min/1.73 sq.m. Kidney Failure: less than 15 ml/min/1.73 sq. m. Results valid for patients 18 years and older. GFR                           Date: 04/16/2021Value: >60           Status: FinalCalcium                                       Date: 04/16/2021Value: 8.9         Ref range: 8.6 - 10.2 mg/dL   Status: FinalWOUND/ABSCESS                                 Date: 04/15/2021Value: Growth not present                     Status: FinalVancomycin Tr                                 Date: 04/17/2021Value: 11.3        Ref range: 5.0 - 16.0 mcg/mL  Status: [address] [facility]                                           Date: 04/17/2021Value: 8.4         Ref range: 4.5 - 11.5 E9/L    Status: FinalRBC                                           Date: 04/17/2021Value: 3.98        Ref range: 3.50 - 5.50 E12/L  Status: FinalHemoglobin                                    Date: 04/17/2021Value: 11.9        Ref range: 11.5 - 15.5 g/dL   Status: FinalHematocrit                                    Date: 04/17/2021Value: 38.1        Ref range: 34.0 - 48.0 %      Status: FinalMCV                                           Date: 04/17/2021Value: 95.7        Ref range: 80.0 - 99.9 fL     Status: [facility]                                           Date: 04/17/2021Value: 29.9        Ref range: 26.0 - 35.0 pg     Status: [address]                                          Date: 04/17/2021Value: 31.2*       Ref range: 32.0 - 34.5 %      Status: FinalRDW                                           Date: 04/17/2021Value: 14.4        Ref range: 11.5 - 15.0 fL     Status: FinalPlatelets                                     Date: 04/17/2021Value: 323         Ref range: 130 - 450 E9/L     Status: FinalMPV                                           Date: 04/17/2021Value: 9.7         Ref range: 7.0 - 12.0 fL      Status: FinalSodium                                        Date: 04/17/2021Value: 143         Ref range: 132 - 146 mmol/L   Status: FinalPotassium                                     Date: 04/17/2021Value: 3.7         Ref range: 3.5 - 5.0 mmol/L   Status: FinalChloride                                      Date: 04/17/2021Value: 101         Ref range: 98 - 107 mmol/L    Status: FinalCO2                                           Date: 04/17/2021Value: 31*         Ref range: 22 - 29 mmol/L     Status: FinalAnion Gap                                     Date: 04/17/2021Value: 11          Ref range: 7 - 16 mmol/L      Status: FinalGlucose                                       Date: 04/17/2021Value: 104*        Ref range: 74 - 99 mg/dL      Status: FinalBUN                                           Date: 04/17/2021Value: 13          Ref range: 6 - 20 mg/dL       Status: FinalCREATININE                                    Date: 04/17/2021Value: 1.0         Ref range: 0.5 - 1.0 mg/dL    Status: FinalGFR Non-                      Date: 04/17/2021Value: 59          Ref range: >=60 mL/min/1.73   Status: Final              Comment: Chronic Kidney Disease: less than 60 ml/min/1.73 sq.m. Kidney Failure: less than 15 ml/min/1.73 sq. m. Results valid for patients 18 years and older. GFR                           Date: 04/17/2021Value: >60           Status: FinalCalcium                                       Date: 04/17/2021Value: 9.1         Ref range: 8.6 - 10.2 mg/dL   Status: [address] [facility]                                           Date: 04/18/2021Value: 6.9         Ref range: 4.5 - 11.5 E9/L    Status: FinalRBC                                           Date: 04/18/2021Value: 3.89        Ref range: 3.50 - 5.50 E12/L  Status: FinalHemoglobin                                    Date: 04/18/2021Value: 11.7        Ref range: 11.5 - 15.5 g/dL   Status: FinalHematocrit                                    Date: 04/18/2021Value: 37.0        Ref range: 34.0 - 48.0 %      Status: FinalMCV                                           Date: 04/18/2021Value: 95.1        Ref range: 80.0 - 99.9 fL     Status: [address] [place]                                           Date: 04/18/2021Value: 30.1        Ref range: 26.0 - 35.0 pg     Status: [address]                                          Date: 04/18/2021Value: 31.6*       Ref range: 32.0 - 34.5 %      Status: FinalRDW                                           Date: 04/18/2021Value: 14.2        Ref range: 11.5 - 15.0 fL     Status: FinalPlatelets                                     Date: 04/18/2021Value: 309         Ref range: 130 - 450 E9/L     Status: FinalMPV                                           Date: 04/18/2021Value: 9.9         Ref range: 7.0 - 12.0 fL      Status: FinalSodium                                        Date: 04/18/2021Value: 142         Ref range: 132 - 146 mmol/L   Status: FinalPotassium                                     Date: 04/18/2021Value: 3.7         Ref range: 3.5 - 5.0 mmol/L   Status: Final              Comment: Specimen is moderately Hemolyzed. Result may be artificially increased. Chloride Date: 04/18/2021Value: 101         Ref range: 98 - 107 mmol/L    Status: FinalCO2                                           Date: 04/18/2021Value: 27          Ref range: 22 - 29 mmol/L     Status: FinalAnion Gap                                     Date: 04/18/2021Value: 14          Ref range: 7 - 16 mmol/L      Status: FinalGlucose                                       Date: 04/18/2021Value: 96          Ref range: 74 - 99 mg/dL      Status: FinalBUN                                           Date: 04/18/2021Value: 13          Ref range: 6 - 20 mg/dL       Status: FinalCREATININE                                    Date: 04/18/2021Value: 1.0         Ref range: 0.5 - 1.0 mg/dL    Status: FinalGFR Non-                      Date: 04/18/2021Value: 59          Ref range: >=60 mL/min/1.73   Status: Final              Comment: Chronic Kidney Disease: less than 60 ml/min/1.73 sq.m. Kidney Failure: less than 15 ml/min/1.73 sq. m. Results valid for patients 18 years and older. GFR                           Date: 04/18/2021Value: >60           Status: FinalCalcium                                       Date: 04/18/2021Value: 9.0         Ref range: 8.6 - 10.2 mg/dL   Status: Final------------    Radiology last 7 days:  Xr Tibia Fibula Left (2 Views)Result Date: 4/14/2021Persistent soft tissue stranding in the lateral and anterior proximal to mid left shin. No underlying osseous abnormality seen. Ct Tibia Fibula Left Wo ContrastResult Date: 4/19/2021Subcutaneous edema along the anteromedial aspect of the lower leg suggesting cellulitis. No deep soft tissue infection, soft tissue gas or abscess. No acute bone or joint abnormality.       Pending Labs   Order Current Status  Culture, Wound Collected (04/15/21 1932)  Culture, Blood 1 Preliminary result  Culture, Blood 2 Preliminary result      Discharge Medications    Current Discharge Medication ListSTART taking these medicationsvancomycin (VANCOCIN) infusionInfuse 1,250 mg intravenously every 12 hours for 14 days Compound per protocol. Qty: 78998 mg Refills: 0melatonin 5 MG TBDP disintegrating tabletTake 1 tablet by mouth nightlyQty: 30 tablet Refills: 0    Current Discharge Medication ListCONTINUE these medications which have CHANGEDDULoxetine (CYMBALTA) 60 MG extended release capsuleTake 1 capsule by mouth dailyQty: 30 capsule Refills: 3    Current Discharge Medication ListCONTINUE these medications which have NOT CHANGEDhydroCHLOROthiazide (HYDRODIURIL) 25 MG tabletTake 25 mg by mouth dailyMultiple Vitamins-Minerals (THERAPEUTIC MULTIVITAMIN-MINERALS) tabletTake 1 tablet by mouth dailyalbuterol sulfate HFA (VENTOLIN HFA) 108 (90 Base) MCG/ACT inhalerInhale 2 puffs into the lungs every 6 hours as needed for Wheezingdocusate sodium (COLACE) 100 MG capsuleTake 100 mg by mouth daily ibuprofen (ADVIL;MOTRIN) 800 MG tabletTake 1 tablet by mouth every 8 hours as needed for PainQty: 120 tablet Refills: 3montelukast (SINGULAIR) 10 MG tabletTake 10 mg by mouth daily Icosapent Ethyl (VASCEPA) 1 g CAPS capsuleTake 1 capsule by mouth dailyCholecalciferol (VITAMIN D) 2000 UNITS CAPS capsuleTake 5,000 Units by mouth Levothyroxine Sodium (SYNTHROID PO)Take 50 mcg by mouth daily     Current Discharge Medication ListSTOP taking these medicationscephALEXin (KEFLEX) 500 MG capsuleComments:Reason for Stopping:traMADol (ULTRAM) 50 MG tabletComments:Reason for Stopping:neomycin-bacitracin-polymyxin (NEOSPORIN) 400-5-5000 ointmentComments:Reason for Stopping:    Time Spent on Discharge:1E] minutes were spent in patient examination, evaluation, counseling as well as medication reconciliation, prescriptions for required medications, discharge plan, and follow up.     Electronically signed by Merced Raymond MD on 4/19/21 at 4:27 PM EDT

## 2021-04-19 NOTE — PROGRESS NOTES
Pharmacy Consultation Note  (Antibiotic Dosing and Monitoring)    Initial consult date: 4/15/21  Consulting physician: Dr. Bar Carlos   Drug: Vancomycin  Indication: Cellulitis of left lower extremity     Age/  Gender Height Weight IBW Dosing weight  Allergy Information   46 y.o./female 5' 1\" (154.9 cm) (!) 320 lb (145.2 kg)     Ideal body weight: 47.8 kg (105 lb 6.1 oz)  Adjusted ideal body weight: 86.7 kg (191 lb 3.6 oz)  86.7 kg  Latex, Adhesive tape, Aspirin, Benadryl [diphenhydramine hcl], Codeine, Demerol, Famotidine, and Phenergan [promethazine hcl]      Temp (24hrs), Av.8 °F (36.6 °C), Min:97.5 °F (36.4 °C), Max:98.2 °F (36.8 °C)          Date  WBC BUN SCr CrCl  (mL/min) Drug/Dose Time   Given Level(s)   (Time) Comments    12.3 10 1 94 Vancomycin 2000 mg IV x1 2347     4/15 10.5 13 1 94 Vancomycin 1250 mg IV q12h 1223  2355      8.7 10 0.9 >100 Vancomycin 1250 mg IV q12h 1314      8.4 13 1 94 Vancomycin 1250 mg IV q12h 0100  1422 Trough: 11.3 mcg/mL (0001)     6.9 13 1 94 Vancomycin 1250 mg IV q12h 0021  1325  2334      -- -- -- 94 Vancomycin 1250mg IV q12h <1300>                      Intake/Output Summary (Last 24 hours) at 2021 1248  Last data filed at 2021 0912  Gross per 24 hour   Intake 760 ml   Output    Net 760 ml       Historical Cultures:  Organism   Date Value Ref Range Status   2021 Corynebacterium species (A)  Final     No results for input(s): BC in the last 72 hours. Cultures:  available culture and sensitivity results were reviewed in EPIC    Assessment:  · 48 y.o. female has been initiated vancomycin and Zosyn for cellulitis of left lower extremity after cutting it on the . At [facility] ER she was given cephalexin 500 mg TID. Wound got worse and her PCP changed the antibiotic to Augmentin 875-125 mg BID.     · ID following  · Estimated CrCl = 94 mL/min  · Trough level from 0001 on  was 11.3 mcg/mL; estimated AUC/ERICA 486  · Goal trough

## 2021-04-19 NOTE — PROGRESS NOTES
GENERAL SURGERY  DAILY PROGRESS NOTE  4/19/2021    Subjective:  Marlo Kelly is a 47 y/o female who fell and hit her left leg on a  2 weeks ago and developed redness, swelling, and pain. Diagnosed with LLE cellulitis with possible abscess and treated with Vanc and Zosyn. Pt underwent I&D on 4/15. Appeared consistent with an infected hematoma. The cavity was packed with sterile gauze and dressed with gauze and tape. General surgery was re-consulted today to re-assess the wound and for possible debridement. Noted that wound cx from 4/14 grew 1 colony of Corynebacterium. Repeat wound cx from 4/15 with no growth. Objective:  BP (!) 165/85   Pulse 79   Temp 97.8 °F (36.6 °C) (Infrared)   Resp 18   Ht 5' 1\" (1.549 m)   Wt (!) 320 lb (145.2 kg)   LMP  (LMP Unknown)   SpO2 90%   BMI 60.46 kg/m²     Gen: Alert, oriented, no apparent distress, non-toxic appearing  HEENT: NCAT, anicteric  CV: RR  Pulm: Nonlabored breathing on room air  Abdomen: Soft, nontender, nondistended  Extremities: Moving all extremities, no peripheral edema  Skin: Open wound to the anterior left lower leg with well appearing granulation tissue. No active drainage. No surrounding erythema or induration. 4/14 Anterior left lower leg      4/19 Anterior left lower leg      Assessment/Plan:  48 y.o. female with left leg cellulitis and infected hematoma s/p I&D 4/15. Wound cx with 1 colony of Corynebacterium. Repeat wound cx no growth. Receiving Vanc per ID, to continue for 14 days. Afebrile. VSS. No leukocytosis. No current signs of infection.  Wound re-packed and new dressing applied.    - No acute surgical intervention, no need for debridement  - Okay for discharge from surgery standpoint    Electronically signed by Destiny Martines MD on 4/19/2021 at 3:55 PM

## 2021-04-21 ENCOUNTER — TELEPHONE (OUTPATIENT)
Dept: SURGERY | Age: 46
End: 2021-04-21

## 2021-04-21 NOTE — TELEPHONE ENCOUNTER
AHMET Sin received a call from patient wanting to schedule an appointment. MA scheduled pt for 5/14/21 @ 9:45am with Amanda Richards in [facility]. Patient verbalized appointment date, time and location. MA verified number that was good to do reminder call on was the one listed in chart. MA advised patient where to park and enter in the building for the appointment.     Electronically signed by Yara Torres MA on 4/21/21 at 1:02 PM EDT

## 2021-04-22 ENCOUNTER — IMMUNIZATION (OUTPATIENT)
Dept: PRIMARY CARE CLINIC | Age: 46
End: 2021-04-22
Payer: OTHER GOVERNMENT

## 2021-04-22 LAB
ALBUMIN SERPL-MCNC: 3.7 G/DL (ref 3.5–5.2)
ALP BLD-CCNC: 79 U/L (ref 35–104)
ALT SERPL-CCNC: 31 U/L (ref 0–32)
ANION GAP SERPL CALCULATED.3IONS-SCNC: 12 MMOL/L (ref 7–16)
AST SERPL-CCNC: 30 U/L (ref 0–31)
BASOPHILS ABSOLUTE: 0.04 E9/L (ref 0–0.2)
BASOPHILS RELATIVE PERCENT: 0.6 % (ref 0–2)
BILIRUB SERPL-MCNC: <0.2 MG/DL (ref 0–1.2)
BUN BLDV-MCNC: 9 MG/DL (ref 6–20)
C-REACTIVE PROTEIN: 2.5 MG/DL (ref 0–0.4)
CALCIUM SERPL-MCNC: 9.4 MG/DL (ref 8.6–10.2)
CHLORIDE BLD-SCNC: 102 MMOL/L (ref 98–107)
CO2: 25 MMOL/L (ref 22–29)
CREAT SERPL-MCNC: 0.9 MG/DL (ref 0.5–1)
EOSINOPHILS ABSOLUTE: 0.13 E9/L (ref 0.05–0.5)
EOSINOPHILS RELATIVE PERCENT: 2.1 % (ref 0–6)
GFR AFRICAN AMERICAN: >60
GFR NON-AFRICAN AMERICAN: >60 ML/MIN/1.73
GLUCOSE BLD-MCNC: 108 MG/DL (ref 74–99)
HCT VFR BLD CALC: 39.1 % (ref 34–48)
HEMOGLOBIN: 12.3 G/DL (ref 11.5–15.5)
IMMATURE GRANULOCYTES #: 0.05 E9/L
IMMATURE GRANULOCYTES %: 0.8 % (ref 0–5)
LYMPHOCYTES ABSOLUTE: 1.64 E9/L (ref 1.5–4)
LYMPHOCYTES RELATIVE PERCENT: 26.2 % (ref 20–42)
MCH RBC QN AUTO: 29.8 PG (ref 26–35)
MCHC RBC AUTO-ENTMCNC: 31.5 % (ref 32–34.5)
MCV RBC AUTO: 94.7 FL (ref 80–99.9)
MONOCYTES ABSOLUTE: 0.47 E9/L (ref 0.1–0.95)
MONOCYTES RELATIVE PERCENT: 7.5 % (ref 2–12)
NEUTROPHILS ABSOLUTE: 3.93 E9/L (ref 1.8–7.3)
NEUTROPHILS RELATIVE PERCENT: 62.8 % (ref 43–80)
PDW BLD-RTO: 14.4 FL (ref 11.5–15)
PLATELET # BLD: 321 E9/L (ref 130–450)
PMV BLD AUTO: 10.3 FL (ref 7–12)
POTASSIUM SERPL-SCNC: 3.8 MMOL/L (ref 3.5–5)
RBC # BLD: 4.13 E12/L (ref 3.5–5.5)
SEDIMENTATION RATE, ERYTHROCYTE: 55 MM/HR (ref 0–20)
SODIUM BLD-SCNC: 139 MMOL/L (ref 132–146)
TOTAL PROTEIN: 7.2 G/DL (ref 6.4–8.3)
VANCOMYCIN TROUGH: 8.5 MCG/ML (ref 5–16)
WBC # BLD: 6.3 E9/L (ref 4.5–11.5)

## 2021-04-22 PROCEDURE — 91300 COVID-19, PFIZER VACCINE 30MCG/0.3ML DOSE: CPT | Performed by: NURSE PRACTITIONER

## 2021-04-22 PROCEDURE — 0002A COVID-19, PFIZER VACCINE 30MCG/0.3ML DOSE: CPT | Performed by: NURSE PRACTITIONER

## 2021-04-26 ENCOUNTER — HOSPITAL ENCOUNTER (INPATIENT)
Age: 46
LOS: 2 days | Discharge: HOME | DRG: 869 | End: 2021-04-28
Attending: EMERGENCY MEDICINE | Admitting: FAMILY MEDICINE
Payer: OTHER GOVERNMENT

## 2021-04-26 ENCOUNTER — APPOINTMENT (OUTPATIENT)
Dept: GENERAL RADIOLOGY | Age: 46
DRG: 869 | End: 2021-04-26
Payer: OTHER GOVERNMENT

## 2021-04-26 DIAGNOSIS — R50.9 FEVER IN ADULT: Primary | ICD-10-CM

## 2021-04-26 LAB
ALBUMIN SERPL-MCNC: 3.7 G/DL (ref 3.5–5.2)
ALP BLD-CCNC: 74 U/L (ref 35–104)
ALT SERPL-CCNC: 36 U/L (ref 0–32)
ANION GAP SERPL CALCULATED.3IONS-SCNC: 10 MMOL/L (ref 7–16)
AST SERPL-CCNC: 30 U/L (ref 0–31)
BILIRUB SERPL-MCNC: 0.3 MG/DL (ref 0–1.2)
BUN BLDV-MCNC: 7 MG/DL (ref 6–20)
CALCIUM SERPL-MCNC: 8.8 MG/DL (ref 8.6–10.2)
CHLORIDE BLD-SCNC: 101 MMOL/L (ref 98–107)
CO2: 26 MMOL/L (ref 22–29)
CREAT SERPL-MCNC: 1 MG/DL (ref 0.5–1)
GFR AFRICAN AMERICAN: >60
GFR NON-AFRICAN AMERICAN: 59 ML/MIN/1.73
GLUCOSE BLD-MCNC: 105 MG/DL (ref 74–99)
HCT VFR BLD CALC: 39.2 % (ref 34–48)
HEMOGLOBIN: 12.7 G/DL (ref 11.5–15.5)
MCH RBC QN AUTO: 30 PG (ref 26–35)
MCHC RBC AUTO-ENTMCNC: 32.4 % (ref 32–34.5)
MCV RBC AUTO: 92.5 FL (ref 80–99.9)
PDW BLD-RTO: 14.4 FL (ref 11.5–15)
PLATELET # BLD: 220 E9/L (ref 130–450)
PMV BLD AUTO: 9.2 FL (ref 7–12)
POTASSIUM SERPL-SCNC: 3.6 MMOL/L (ref 3.5–5)
RBC # BLD: 4.24 E12/L (ref 3.5–5.5)
SODIUM BLD-SCNC: 137 MMOL/L (ref 132–146)
TOTAL PROTEIN: 7.1 G/DL (ref 6.4–8.3)
WBC # BLD: 4.9 E9/L (ref 4.5–11.5)

## 2021-04-26 PROCEDURE — 87635 SARS-COV-2 COVID-19 AMP PRB: CPT

## 2021-04-26 PROCEDURE — 71045 X-RAY EXAM CHEST 1 VIEW: CPT

## 2021-04-26 PROCEDURE — 83605 ASSAY OF LACTIC ACID: CPT

## 2021-04-26 PROCEDURE — 85027 COMPLETE CBC AUTOMATED: CPT

## 2021-04-26 PROCEDURE — 99283 EMERGENCY DEPT VISIT LOW MDM: CPT

## 2021-04-26 PROCEDURE — 87040 BLOOD CULTURE FOR BACTERIA: CPT

## 2021-04-26 PROCEDURE — 1200000000 HC SEMI PRIVATE

## 2021-04-26 PROCEDURE — 6360000002 HC RX W HCPCS: Performed by: STUDENT IN AN ORGANIZED HEALTH CARE EDUCATION/TRAINING PROGRAM

## 2021-04-26 PROCEDURE — 2580000003 HC RX 258: Performed by: STUDENT IN AN ORGANIZED HEALTH CARE EDUCATION/TRAINING PROGRAM

## 2021-04-26 PROCEDURE — 80053 COMPREHEN METABOLIC PANEL: CPT

## 2021-04-26 RX ADMIN — CEFEPIME HYDROCHLORIDE 2000 MG: 2 INJECTION, POWDER, FOR SOLUTION INTRAVENOUS at 23:53

## 2021-04-27 LAB
BACTERIA: ABNORMAL /HPF
BILIRUBIN URINE: NEGATIVE
BLOOD, URINE: NEGATIVE
CLARITY: CLEAR
COLOR: YELLOW
EPITHELIAL CELLS, UA: ABNORMAL /HPF
GLUCOSE URINE: NEGATIVE MG/DL
KETONES, URINE: NEGATIVE MG/DL
LACTIC ACID, SEPSIS: 1.1 MMOL/L (ref 0.5–1.9)
LACTIC ACID, SEPSIS: 1.8 MMOL/L (ref 0.5–1.9)
LEUKOCYTE ESTERASE, URINE: NEGATIVE
NITRITE, URINE: NEGATIVE
PH UA: 6 (ref 5–9)
PROTEIN UA: NORMAL MG/DL
RBC UA: ABNORMAL /HPF (ref 0–2)
SARS-COV-2, NAAT: NOT DETECTED
SPECIFIC GRAVITY UA: >=1.03 (ref 1–1.03)
UROBILINOGEN, URINE: 0.2 E.U./DL
WBC UA: ABNORMAL /HPF (ref 0–5)
YEAST: PRESENT /HPF

## 2021-04-27 PROCEDURE — 83605 ASSAY OF LACTIC ACID: CPT

## 2021-04-27 PROCEDURE — 86747 PARVOVIRUS ANTIBODY: CPT

## 2021-04-27 PROCEDURE — 81001 URINALYSIS AUTO W/SCOPE: CPT

## 2021-04-27 PROCEDURE — 86611 BARTONELLA ANTIBODY: CPT

## 2021-04-27 PROCEDURE — 2580000003 HC RX 258: Performed by: STUDENT IN AN ORGANIZED HEALTH CARE EDUCATION/TRAINING PROGRAM

## 2021-04-27 PROCEDURE — 1200000000 HC SEMI PRIVATE

## 2021-04-27 PROCEDURE — 6360000002 HC RX W HCPCS: Performed by: STUDENT IN AN ORGANIZED HEALTH CARE EDUCATION/TRAINING PROGRAM

## 2021-04-27 PROCEDURE — 6360000002 HC RX W HCPCS: Performed by: FAMILY MEDICINE

## 2021-04-27 PROCEDURE — 36415 COLL VENOUS BLD VENIPUNCTURE: CPT

## 2021-04-27 PROCEDURE — 6370000000 HC RX 637 (ALT 250 FOR IP): Performed by: FAMILY MEDICINE

## 2021-04-27 RX ORDER — ONDANSETRON 2 MG/ML
4 INJECTION INTRAMUSCULAR; INTRAVENOUS EVERY 6 HOURS PRN
Status: DISCONTINUED | OUTPATIENT
Start: 2021-04-27 | End: 2021-04-28 | Stop reason: HOSPADM

## 2021-04-27 RX ORDER — IPRATROPIUM BROMIDE AND ALBUTEROL SULFATE 2.5; .5 MG/3ML; MG/3ML
1 SOLUTION RESPIRATORY (INHALATION) EVERY 4 HOURS PRN
Status: DISCONTINUED | OUTPATIENT
Start: 2021-04-27 | End: 2021-04-28 | Stop reason: HOSPADM

## 2021-04-27 RX ORDER — LEVOTHYROXINE SODIUM 0.05 MG/1
50 TABLET ORAL
Status: DISCONTINUED | OUTPATIENT
Start: 2021-04-27 | End: 2021-04-28 | Stop reason: HOSPADM

## 2021-04-27 RX ORDER — MONTELUKAST SODIUM 10 MG/1
10 TABLET ORAL DAILY
Status: DISCONTINUED | OUTPATIENT
Start: 2021-04-27 | End: 2021-04-28 | Stop reason: HOSPADM

## 2021-04-27 RX ORDER — MECOBALAMIN 5000 MCG
5 TABLET,DISINTEGRATING ORAL NIGHTLY
Status: DISCONTINUED | OUTPATIENT
Start: 2021-04-27 | End: 2021-04-28 | Stop reason: HOSPADM

## 2021-04-27 RX ORDER — M-VIT,TX,IRON,MINS/CALC/FOLIC 27MG-0.4MG
1 TABLET ORAL DAILY
Status: DISCONTINUED | OUTPATIENT
Start: 2021-04-27 | End: 2021-04-28 | Stop reason: HOSPADM

## 2021-04-27 RX ORDER — GABAPENTIN 600 MG/1
600 TABLET ORAL 2 TIMES DAILY
COMMUNITY
End: 2021-08-23

## 2021-04-27 RX ORDER — HYDROCHLOROTHIAZIDE 25 MG/1
25 TABLET ORAL DAILY
Status: DISCONTINUED | OUTPATIENT
Start: 2021-04-27 | End: 2021-04-28 | Stop reason: HOSPADM

## 2021-04-27 RX ORDER — GABAPENTIN 600 MG/1
600 TABLET ORAL 2 TIMES DAILY
Status: DISCONTINUED | OUTPATIENT
Start: 2021-04-27 | End: 2021-04-28 | Stop reason: HOSPADM

## 2021-04-27 RX ORDER — DOCUSATE SODIUM 100 MG/1
100 CAPSULE, LIQUID FILLED ORAL DAILY
Status: DISCONTINUED | OUTPATIENT
Start: 2021-04-27 | End: 2021-04-28 | Stop reason: HOSPADM

## 2021-04-27 RX ORDER — DULOXETIN HYDROCHLORIDE 60 MG/1
60 CAPSULE, DELAYED RELEASE ORAL DAILY
Status: DISCONTINUED | OUTPATIENT
Start: 2021-04-27 | End: 2021-04-28 | Stop reason: HOSPADM

## 2021-04-27 RX ADMIN — LEVOTHYROXINE SODIUM 50 MCG: 0.05 TABLET ORAL at 08:10

## 2021-04-27 RX ADMIN — MONTELUKAST SODIUM 10 MG: 10 TABLET ORAL at 08:10

## 2021-04-27 RX ADMIN — ENOXAPARIN SODIUM 40 MG: 40 INJECTION SUBCUTANEOUS at 08:10

## 2021-04-27 RX ADMIN — GABAPENTIN 600 MG: 600 TABLET, FILM COATED ORAL at 08:10

## 2021-04-27 RX ADMIN — DULOXETINE HYDROCHLORIDE 60 MG: 60 CAPSULE, DELAYED RELEASE ORAL at 08:10

## 2021-04-27 RX ADMIN — Medication 5 MG: at 22:00

## 2021-04-27 RX ADMIN — HYDROCHLOROTHIAZIDE 25 MG: 25 TABLET ORAL at 08:10

## 2021-04-27 RX ADMIN — VANCOMYCIN HYDROCHLORIDE 2000 MG: 10 INJECTION, POWDER, LYOPHILIZED, FOR SOLUTION INTRAVENOUS at 04:51

## 2021-04-27 RX ADMIN — GABAPENTIN 600 MG: 600 TABLET, FILM COATED ORAL at 21:59

## 2021-04-27 RX ADMIN — Medication 1 TABLET: at 08:10

## 2021-04-27 ASSESSMENT — PAIN SCALES - GENERAL
PAINLEVEL_OUTOF10: 0
PAINLEVEL_OUTOF10: 0

## 2021-04-27 ASSESSMENT — ENCOUNTER SYMPTOMS
SORE THROAT: 0
COUGH: 0
PHOTOPHOBIA: 0
NAUSEA: 1
BACK PAIN: 0
ABDOMINAL PAIN: 0
EYE PAIN: 0
RHINORRHEA: 0
CHEST TIGHTNESS: 0
CONSTIPATION: 0
SHORTNESS OF BREATH: 0
DIARRHEA: 1
TROUBLE SWALLOWING: 0
APNEA: 0
WHEEZING: 0
VOMITING: 0

## 2021-04-27 NOTE — PROGRESS NOTES
Nutrition Assessment     Type and Reason for Visit: Initial, Positive Nutrition Screen    Nutrition Recommendations/Plan: Continue current diet, Start Ensure HP & JuvenBID     Nutrition Assessment:  Pt adm w/ fever s/p recent I&D of leg wound. Hx preDM. Will provide ONS and monitor    Malnutrition Assessment:  Malnutrition Status: No malnutrition    Nutrition Related Findings: pt alert, abd WDL, no noted edema, no fluids noted at this time      Current Nutrition Therapies:    DIET CARDIAC; Anthropometric Measures:  · Height: 5' 1\" (154.9 cm)  · Current Body Wt: 320 lb (145.2 kg)(stated)   · BMI: 60.5    Nutrition Diagnosis:   · Increased nutrient needs related to increase demand for energy/nutrients as evidenced by wounds      Nutrition Interventions:   Food and/or Nutrient Delivery:  Continue Current Diet, Start Oral Nutrition Supplement(Ensure HP & [place] BID)  Nutrition Education/Counseling:  Education not indicated   Coordination of Nutrition Care:  Continue to monitor while inpatient    Goals:  Consume >75% meals/ONS       Nutrition Monitoring and Evaluation:   Food/Nutrient Intake Outcomes:  Diet Advancement/Tolerance, Food and Nutrient Intake, Supplement Intake  Physical Signs/Symptoms Outcomes:  Biochemical Data, GI Status, Fluid Status or Edema, Nutrition Focused Physical Findings, Skin, Weight     Discharge Planning:     Too soon to determine     Electronically signed by Lauren Mehta, MS, RD, LD on 4/27/21 at 2:26 PM EDT    Contact: [phone]

## 2021-04-27 NOTE — CARE COORDINATION
Care Coordination/Discharge planning: From home now in observation status. Came to ED with fever . She discharged home from [facility] on 4/19 on IV Vanco q 12 secondary to cellulitis. [facility] was following and planned to discharge today. .  Patient to receive last dose of the scheduled vanco today. ID consult ordered today . Per García at [facility] if continued [facility] needed will need new orders. Loren Wilkerson will follow case. Patient lives at home with spouse and adult son. She has no DME and was independent in short mobility and self care. Dr. Katarzyna Seymour is PCP and she uses [facility] in [facility]. Plan will be home discharge.   SW to follow for home health care need if any

## 2021-04-27 NOTE — ED PROVIDER NOTES
[address]      Pt Name: Juan Chamberlain  MRN: [id number]  [place] 1975  Date of evaluation: 4/26/2021      CHIEF COMPLAINT       Chief Complaint   Patient presents with    Fever     rcv'd 2nd covid shot on thursday, fever/chills/sweats since then, 101.8 at home, (has picc line, vanc d/c'd today, starts on doxy po tomorrow)         HPI  Juan Chamberlain is a 48 y.o. female with history of cellulitis, recently completing course of vancomycin with PICC line in place who presents to the emergency department with complaints of fever. The patient states that since her discharge she has been getting vancomycin through PICC line for cellulitis of her left leg. Today was her last dose of vancomycin. She states that she had a fever of 101.8 today at home. She states that for the last several days she has had nausea, decreased appetite, fatigue and intermittent diarrhea. She describes her symptoms as mild to moderate, waxing and waning, nothing makes them better or worse. She denies any chest pain, shortness of breath. She states that she developed a dry cough this afternoon. Except as noted above the remainder of the review of systems was reviewed and negative. Review of Systems   Constitutional: Positive for fatigue and fever. Negative for chills and diaphoresis. HENT: Negative for rhinorrhea, sore throat and trouble swallowing. Eyes: Negative for photophobia and pain. Respiratory: Negative for apnea, cough, chest tightness, shortness of breath and wheezing. Cardiovascular: Negative for chest pain, palpitations and leg swelling. Gastrointestinal: Positive for diarrhea and nausea. Negative for abdominal pain, constipation and vomiting. Endocrine: Negative for polyuria. Genitourinary: Negative for difficulty urinating and dysuria.    Musculoskeletal: Negative for back pain, neck pain and neck stiffness. Skin: Negative for pallor and rash. Neurological: Negative for dizziness, speech difficulty, weakness, light-headedness and headaches. Psychiatric/Behavioral: Negative for confusion. The patient is not nervous/anxious. Physical Exam  Vitals signs and nursing note reviewed. Constitutional:       General: She is not in acute distress. Appearance: She is well-developed. Comments: Awake and alert. Sitting in the gurney in no obvious distress. HENT:      Head: Normocephalic and atraumatic. Right Ear: External ear normal.      Left Ear: External ear normal.      Mouth/Throat:      Mouth: Mucous membranes are moist.   Eyes:      General: No scleral icterus. Pupils: Pupils are equal, round, and reactive to light. Neck:      Musculoskeletal: Normal range of motion and neck supple. Cardiovascular:      Rate and Rhythm: Normal rate and regular rhythm. Heart sounds: No murmur. Comments: 2+ radial and dorsal pedis pulses bilaterally  Pulmonary:      Effort: Pulmonary effort is normal. No respiratory distress. Breath sounds: Normal breath sounds. No wheezing. Abdominal:      Palpations: Abdomen is soft. Tenderness: There is no abdominal tenderness. There is no guarding or rebound. Musculoskeletal: Normal range of motion. General: No tenderness or deformity. Right lower leg: No edema. Left lower leg: No edema. Skin:     General: Skin is warm and dry. Capillary Refill: Capillary refill takes less than 2 seconds. Comments: PICC line right upper extremity with no signs of infection no erythema no fluctuance. Left lower extremity on the anterior shin there is a site of previous I&D with no erythema or other signs of cellulitis. Packing is in place. No purulent drainage. Neurological:      General: No focal deficit present. Mental Status: She is alert and oriented to person, place, and time. Cranial Nerves:  No -------------------------------------------------- RESULTS -------------------------------------------------    LABS:  Results for orders placed or performed during the hospital encounter of 04/26/21   COVID-19, Rapid    Specimen: Nasopharyngeal Swab   Result Value Ref Range    SARS-CoV-2, NAAT Not Detected Not Detected   CBC   Result Value Ref Range    WBC 4.9 4.5 - 11.5 E9/L    RBC 4.24 3.50 - 5.50 E12/L    Hemoglobin 12.7 11.5 - 15.5 g/dL    Hematocrit 39.2 34.0 - 48.0 %    MCV 92.5 80.0 - 99.9 fL    MCH 30.0 26.0 - 35.0 pg    MCHC 32.4 32.0 - 34.5 %    RDW 14.4 11.5 - 15.0 fL    Platelets 924 668 - 784 E9/L    MPV 9.2 7.0 - 12.0 fL   Comprehensive metabolic panel   Result Value Ref Range    Sodium 137 132 - 146 mmol/L    Potassium 3.6 3.5 - 5.0 mmol/L    Chloride 101 98 - 107 mmol/L    CO2 26 22 - 29 mmol/L    Anion Gap 10 7 - 16 mmol/L    Glucose 105 (H) 74 - 99 mg/dL    BUN 7 6 - 20 mg/dL    CREATININE 1.0 0.5 - 1.0 mg/dL    GFR Non-African American 59 >=60 mL/min/1.73    GFR African American >60     Calcium 8.8 8.6 - 10.2 mg/dL    Total Protein 7.1 6.4 - 8.3 g/dL    Albumin 3.7 3.5 - 5.2 g/dL    Total Bilirubin 0.3 0.0 - 1.2 mg/dL    Alkaline Phosphatase 74 35 - 104 U/L    ALT 36 (H) 0 - 32 U/L    AST 30 0 - 31 U/L   Lactate, Sepsis   Result Value Ref Range    Lactic Acid, Sepsis 1.1 0.5 - 1.9 mmol/L       RADIOLOGY:  XR CHEST PORTABLE   Final Result   Negative single view chest for acute process. ------------------------- NURSING NOTES AND VITALS REVIEWED ---------------------------  Date / Time Roomed:  4/26/2021  9:53 PM  ED Bed Assignment:  17B/17B-17    The nursing notes within the ED encounter and vital signs as below have been reviewed.      Patient Vitals for the past 24 hrs:   BP Temp Temp src Pulse Resp SpO2 Height Weight   04/26/21 2045 (!) 167/83   100   5' 1\" (1.549 m) (!) 320 lb (145.2 kg)   04/26/21 2042  97.1 °F (36.2 °C) Infrared 76 14 95 %         Oxygen Saturation Interpretation: Normal    ------------------------------------------ PROGRESS NOTES ------------------------------------------    Counseling:  I have spoken with the patient and discussed todays results, in addition to providing specific details for the plan of care and counseling regarding the diagnosis and prognosis. Their questions are answered at this time and they are agreeable with the plan of admission.    --------------------------------- ADDITIONAL PROVIDER NOTES ---------------------------------  Consultations:  Spoke with Dr. Remy Moura. Discussed case. They will admit the patient. This patient's ED course included: a personal history and physicial examination, re-evaluation prior to disposition, multiple bedside re-evaluations, IV medications, cardiac monitoring and continuous pulse oximetry    This patient has remained hemodynamically stable during their ED course. Diagnosis:  1. Fever in adult        Disposition:  Patient's disposition: Admit to med/surg floor  Patient's condition is stable.          Catia Jackson DO  Resident  04/27/21 8410

## 2021-04-27 NOTE — CONSULTS
[address] [place] [address] [facility]    Consultation Note     Admit Date: 4/26/2021  9:53 PM    Reason for Consult:   fever    Attending Physician:  Erickson Ortiz MD     Chief Complaint: fever    HISTORY OF PRESENT ILLNESS:   The patient is a 48 y.o.  female known to the Infectious Diseases service. The patient presents with the below past med hx  I saw her two weeks ago  For LLE cellultis  She has been on home IV vanco  She comes in now with a fever and a lace like rash.    Also complains of diarrhea   I will ask wound care to see her but I think we can stop vanco  She also complains of a rash      Past Medical History:        Diagnosis Date    Asthma     Bladder leak     for OR 9-18-20     Depression     Fibromyalgia     High cholesterol     Irritable bowel     Mitral valve prolapse     f/u w/ PCP only, no issues, no treatment     PONV (postoperative nausea and vomiting)     Prediabetes     Spinal stenosis      Past Surgical History:        Procedure Laterality Date    COLONOSCOPY      CYSTOSCOPY N/A 9/18/2020    CYSTOSCOPY WITH BOTOX INJECTION OF BLADDER 100 UNITS, BLADDER DILATION performed by Hilaria Vasquez MD at [address] [place] [address], ESOPHAGUS      ENDOSCOPY, COLON, DIAGNOSTIC      HYSTERECTOMY      partial    LAPAROSCOPY       Current Medications:   Scheduled Meds:   docusate sodium  100 mg Oral Daily    DULoxetine  60 mg Oral Daily    gabapentin  600 mg Oral BID    hydroCHLOROthiazide  25 mg Oral Daily    levothyroxine  50 mcg Oral QAM AC    melatonin  5 mg Oral Nightly    montelukast  10 mg Oral Daily    therapeutic multivitamin-minerals  1 tablet Oral Daily    enoxaparin  40 mg Subcutaneous Daily    vancomycin  1,250 mg Intravenous Q12H     Continuous Infusions:  PRN Meds:ipratropium-albuterol, ondansetron    Allergies:  Latex, Adhesive tape, Aspirin, Benadryl [diphenhydramine hcl], Codeine, Demerol, Famotidine, and Phenergan [promethazine hcl] Social History:   Social History     Socioeconomic History    Marital status:      Spouse name: None    Number of children: None    Years of education: None    Highest education level: None   Occupational History    None   Social Needs    Financial resource strain: None    Food insecurity     Worry: None     Inability: None    Transportation needs     Medical: None     Non-medical: None   Tobacco Use    Smoking status: Never Smoker    Smokeless tobacco: Never Used   Substance and Sexual Activity    Alcohol use: No    Drug use: No    Sexual activity: None   Lifestyle    Physical activity     Days per week: None     Minutes per session: None    Stress: None   Relationships    Social connections     Talks on phone: None     Gets together: None     Attends [employer] service: None     Active member of club or organization: None     Attends meetings of clubs or organizations: None     Relationship status: None    Intimate partner violence     Fear of current or ex partner: None     Emotionally abused: None     Physically abused: None     Forced sexual activity: None   Other Topics Concern    None   Social History Narrative    None     Tobacco: No  Alcohol: No  Pets: No  Travel: No    Family History:       Problem Relation Age of Onset    Heart Disease Mother     High Blood Pressure Mother     Diabetes Father    . Otherwise non-pertinent to the chief complaint. REVIEW OF SYSTEMS:    CONSTITUTIONAL:  No chills, fevers or night sweats. No loss of weight. EYES:  No double vision or drainage from eyes, ears or throat. HEENT:  No neck stiffness. No dysphagia. No drainage from eyes, ears or throat  RESPIRATORY:  No cough, productive sputum or hemoptysis. CARDIOVASCULAR:  No chest pain, palpitations, orthopnea or dyspnea on exertion. GASTROINTESTINAL:  No nausea, vomiting, diarrhea or constipation or hematochezia   GENITOURINARY:  No frequency burning dysuria or hematuria.   INTEGUMENT/BREAST: No rash or breast masses. HEMATOLOGIC/LYMPHATIC:  No lymphadenopathy or blood dyscrasics. ALLERGIC/IMMUNOLOGIC:  No anaphylaxis. ENDOCRINE:  No polyuria or polydipsia or temperature intolerance. MUSCULOSKELETAL:  No myalgia or arthralgia. Full ROM. NEUROLOGICAL:  No focal motor sensory deficit. BEHAVIOR/PSYCH:  No psychosis. PHYSICAL EXAM:    Vitals:    /82   Pulse 73   Temp 98.3 °F (36.8 °C) (Temporal)   Resp 16   Ht 5' 1\" (1.549 m)   Wt (!) 320 lb (145.2 kg)   LMP  (LMP Unknown)   SpO2 99%   BMI 60.46 kg/m²   Constitutional: The patient is awake, alert, and oriented. Skin: Warm and dry. No rashes were noted. HEENT: Eyes show round, and reactive pupils. No jaundice. Moist mucous membranes, no ulcerations, no thrush. Neck: Supple to movements. No lymphadenopathy. Chest: No use of accessory muscles to breathe. Symmetrical expansion. Auscultation reveals no wheezing, crackles, or rhonchi. Cardiovascular: S1 and S2 are rhythmic and regular. No murmurs appreciated. Abdomen: Positive bowel sounds to auscultation. Benign to palpation. No masses felt. No hepatosplenomegaly. Extremities: No clubbing, no cyanosis, no edema.   Lines: peripheral      CBC+dif:  Recent Labs     04/26/21  2100   WBC 4.9   HGB 12.7   HCT 39.2   MCV 92.5        Lab Results   Component Value Date    CRP 2.5 (H) 04/22/2021    CRP 5.5 (H) 04/15/2021     No results found for: CRPHS  Lab Results   Component Value Date    SEDRATE 55 (H) 04/22/2021    SEDRATE 51 (H) 04/15/2021    SEDRATE 40 (H) 04/14/2021     Lab Results   Component Value Date    ALT 36 (H) 04/26/2021    AST 30 04/26/2021    ALKPHOS 74 04/26/2021    BILITOT 0.3 04/26/2021     Lab Results   Component Value Date     04/26/2021    K 3.6 04/26/2021     04/26/2021    CO2 26 04/26/2021    BUN 7 04/26/2021    CREATININE 1.0 04/26/2021    GFRAA >60 04/26/2021    LABGLOM 59 04/26/2021    GLUCOSE 105 04/26/2021    GLUCOSE 116 10/21/2011 PROT 7.1 04/26/2021    LABALBU 3.7 04/26/2021    LABALBU 4.6 10/21/2011    CALCIUM 8.8 04/26/2021    BILITOT 0.3 04/26/2021    ALKPHOS 74 04/26/2021    AST 30 04/26/2021    ALT 36 04/26/2021       Lab Results   Component Value Date    PROTIME 11.2 05/12/2015    INR 1.0 05/12/2015       Lab Results   Component Value Date    TSH 2.640 06/06/2017       Lab Results   Component Value Date    COLORU Yellow 04/27/2021    PHUR 6.0 04/27/2021    WBCUA 0-1 04/27/2021    WBCUA NONE 04/23/2011    RBCUA 0-1 04/27/2021    RBCUA NONE 08/03/2013    YEAST Present 04/27/2021    BACTERIA FEW 04/27/2021    CLARITYU Clear 04/27/2021    SPECGRAV >=1.030 04/27/2021    LEUKOCYTESUR Negative 04/27/2021    UROBILINOGEN 0.2 04/27/2021    BILIRUBINUR Negative 04/27/2021    BILIRUBINUR NEGATIVE 04/23/2011    BLOODU Negative 04/27/2021    GLUCOSEU Negative 04/27/2021    GLUCOSEU NEGATIVE 04/23/2011       No results found for: FAJ9UMJ, BEART, U2HOGLWX, PHART, THGBART, HHM9GGL, PO2ART, YXW2EQE  Radiology:  XR CHEST PORTABLE   Final Result   Negative single view chest for acute process. Microbiology:  Pending  No results for input(s): BC in the last 72 hours. No results for input(s): ORG in the last 72 hours. No results for input(s): Eppie Marissa in the last 72 hours. No results for input(s): STREPNEUMAGU in the last 72 hours. No results for input(s): LP1UAG in the last 72 hours. No results for input(s): ASO in the last 72 hours. No results for input(s): CULTRESP in the last 72 hours. Assessment:  · Fever, rash, cough, diarrhea  · LLE cellulitis resolving she still has a wound   No surrounding cellultis  · Will ask wound care to see  · She has a cat and a dog  · ? vanco rash   · ?  Livedo reticularis  · Check CMV by PCR    Plan:    · Stop vancomycin   · Check for parvovirus   · Wound care consult   · Check cultures  · Baseline ESR, CRP  · Monitor labs  · Will follow with you    Thank you for having us see this patient in consultation. I will be discussing this case with the treating physicians.       Electronically signed by Narendra Markham MD on 4/27/2021 at 11:55 AM

## 2021-04-27 NOTE — ED NOTES
Bed: 17B-17  Expected date:   Expected time:   Means of arrival:   Comments:  Winifred Villeda, RN  04/26/21 9418

## 2021-04-27 NOTE — H&P
HISTORY AND PHYSICAL             Date: 4/27/2021        Patient Name: Chris Nelson     YOB: 1975      Age:  48 y.o. Chief Complaint     Chief Complaint   Patient presents with    Fever     rcv'd 2nd covid shot on thursday, fever/chills/sweats since then, 101.8 at home, (has picc line, vanc d/c'd today, starts on doxy po tomorrow)           History Obtained From   patient    History of Present Illness   Patient was just discharged on vancomycin for a severe left lower ext. Cellulitis. Patient says last night she had a fever above 101 and became concerned. Past Medical History     Past Medical History:   Diagnosis Date    Asthma     Bladder leak     for OR 9-18-20     Depression     Fibromyalgia     High cholesterol     Irritable bowel     Mitral valve prolapse     f/u w/ PCP only, no issues, no treatment     PONV (postoperative nausea and vomiting)     Prediabetes     Spinal stenosis         Past Surgical History     Past Surgical History:   Procedure Laterality Date    COLONOSCOPY      CYSTOSCOPY N/A 9/18/2020    CYSTOSCOPY WITH BOTOX INJECTION OF BLADDER 100 UNITS, BLADDER DILATION performed by Chico Kennedy MD at [address] [place] [address], ESOPHAGUS      ENDOSCOPY, COLON, DIAGNOSTIC      HYSTERECTOMY      partial    LAPAROSCOPY          Medications Prior to Admission     Prior to Admission medications    Medication Sig Start Date End Date Taking? Authorizing Provider   gabapentin (NEURONTIN) 600 MG tablet Take 600 mg by mouth 2 times daily. Yes Historical Provider, MD   vancomycin (VANCOCIN) infusion Infuse 1,250 mg intravenously every 12 hours for 14 days Compound per protocol.  4/19/21 5/3/21 Yes Tristen Marsh MD   DULoxetine (CYMBALTA) 60 MG extended release capsule Take 1 capsule by mouth daily 4/20/21  Yes Jon Palma MD   melatonin 5 MG TBDP disintegrating tablet Take 1 tablet by mouth nightly 4/19/21  Yes Jon Palma MD hydroCHLOROthiazide (HYDRODIURIL) 25 MG tablet Take 25 mg by mouth daily   Yes Historical Provider, MD   Multiple Vitamins-Minerals (THERAPEUTIC MULTIVITAMIN-MINERALS) tablet Take 1 tablet by mouth daily   Yes Historical Provider, MD   albuterol sulfate HFA (VENTOLIN HFA) 108 (90 Base) MCG/ACT inhaler Inhale 2 puffs into the lungs every 6 hours as needed for Wheezing   Yes Historical Provider, MD   docusate sodium (COLACE) 100 MG capsule Take 100 mg by mouth daily    Yes Historical Provider, MD   Cholecalciferol (VITAMIN D) 2000 UNITS CAPS capsule Take 5,000 Units by mouth    Yes Historical Provider, MD   Levothyroxine Sodium (SYNTHROID PO) Take 50 mcg by mouth daily    Yes Historical Provider, MD   montelukast (SINGULAIR) 10 MG tablet Take 10 mg by mouth daily    Yes Historical Provider, MD   Icosapent Ethyl (VASCEPA) 1 g CAPS capsule Take 1 capsule by mouth daily    Historical Provider, MD   ibuprofen (ADVIL;MOTRIN) 800 MG tablet Take 1 tablet by mouth every 8 hours as needed for Pain 6/9/17   Alfonso Gallagher MD        Allergies   Latex, Adhesive tape, Aspirin, Benadryl [diphenhydramine hcl], Codeine, Demerol, Famotidine, and Phenergan [promethazine hcl]    Social History     Social History     Tobacco History     Smoking Status  Never Smoker    Smokeless Tobacco Use  Never Used          Alcohol History     Alcohol Use Status  No          Drug Use     Drug Use Status  No          Sexual Activity     Sexually Active  Not Asked                Family History     Family History   Problem Relation Age of Onset    Heart Disease Mother     High Blood Pressure Mother     Diabetes Father        Review of Systems   Review of Systems   Constitutional: Positive for activity change and fever. HENT: Negative for congestion. Respiratory: Negative for shortness of breath. Cardiovascular: Negative for chest pain. Gastrointestinal: Negative for abdominal pain. Genitourinary: Negative for difficulty urinating. Musculoskeletal: Negative for arthralgias. Skin: Positive for rash and wound. Neurological: Negative for dizziness. Physical Exam   BP (!) 150/82   Pulse 80   Temp 97.6 °F (36.4 °C) (Temporal)   Resp 18   Ht 5' 1\" (1.549 m)   Wt (!) 320 lb (145.2 kg)   LMP  (LMP Unknown)   SpO2 98%   BMI 60.46 kg/m²     Physical Exam  Vitals signs reviewed. HENT:      Head: Normocephalic. Mouth/Throat:      Mouth: Mucous membranes are moist.   Eyes:      Pupils: Pupils are equal, round, and reactive to light. Cardiovascular:      Rate and Rhythm: Normal rate and regular rhythm. Pulses: Normal pulses. Heart sounds: Normal heart sounds. Pulmonary:      Effort: Pulmonary effort is normal. No respiratory distress. Breath sounds: Normal breath sounds. No wheezing. Abdominal:      General: Abdomen is flat. Bowel sounds are normal. There is no distension. Tenderness: There is no abdominal tenderness. Skin:     Capillary Refill: Capillary refill takes less than 2 seconds. Comments: LLE wound much improved, no drainage   Neurological:      General: No focal deficit present. Mental Status: She is alert and oriented to person, place, and time.    Psychiatric:         Mood and Affect: Mood normal.         Behavior: Behavior normal.         Labs      Recent Results (from the past 24 hour(s))   CBC    Collection Time: 04/26/21  9:00 PM   Result Value Ref Range    WBC 4.9 4.5 - 11.5 E9/L    RBC 4.24 3.50 - 5.50 E12/L    Hemoglobin 12.7 11.5 - 15.5 g/dL    Hematocrit 39.2 34.0 - 48.0 %    MCV 92.5 80.0 - 99.9 fL    MCH 30.0 26.0 - 35.0 pg    MCHC 32.4 32.0 - 34.5 %    RDW 14.4 11.5 - 15.0 fL    Platelets 065 761 - 462 E9/L    MPV 9.2 7.0 - 12.0 fL   Comprehensive metabolic panel    Collection Time: 04/26/21  9:00 PM   Result Value Ref Range    Sodium 137 132 - 146 mmol/L    Potassium 3.6 3.5 - 5.0 mmol/L    Chloride 101 98 - 107 mmol/L    CO2 26 22 - 29 mmol/L    Anion Gap 10 7 - 16 mmol/L    Glucose 105 (H) 74 - 99 mg/dL    BUN 7 6 - 20 mg/dL    CREATININE 1.0 0.5 - 1.0 mg/dL    GFR Non-African American 59 >=60 mL/min/1.73    GFR African American >60     Calcium 8.8 8.6 - 10.2 mg/dL    Total Protein 7.1 6.4 - 8.3 g/dL    Albumin 3.7 3.5 - 5.2 g/dL    Total Bilirubin 0.3 0.0 - 1.2 mg/dL    Alkaline Phosphatase 74 35 - 104 U/L    ALT 36 (H) 0 - 32 U/L    AST 30 0 - 31 U/L   Lactate, Sepsis    Collection Time: 04/26/21 11:51 PM   Result Value Ref Range    Lactic Acid, Sepsis 1.1 0.5 - 1.9 mmol/L   COVID-19, Rapid    Collection Time: 04/26/21 11:52 PM    Specimen: Nasopharyngeal Swab   Result Value Ref Range    SARS-CoV-2, NAAT Not Detected Not Detected   Urinalysis, reflex to microscopic    Collection Time: 04/27/21  4:55 AM   Result Value Ref Range    Color, UA Yellow Straw/Yellow    Clarity, UA Clear Clear    Glucose, Ur Negative Negative mg/dL    Bilirubin Urine Negative Negative    Ketones, Urine Negative Negative mg/dL    Specific Gravity, UA >=1.030 1.005 - 1.030    Blood, Urine Negative Negative    pH, UA 6.0 5.0 - 9.0    Protein, UA TRACE Negative mg/dL    Urobilinogen, Urine 0.2 <2.0 E.U./dL    Nitrite, Urine Negative Negative    Leukocyte Esterase, Urine Negative Negative   Microscopic Urinalysis    Collection Time: 04/27/21  4:55 AM   Result Value Ref Range    WBC, UA 0-1 0 - 5 /HPF    RBC, UA 0-1 0 - 2 /HPF    Epithelial Cells, UA RARE /HPF    Bacteria, UA FEW (A) None Seen /HPF    Yeast, UA Present (A) None Seen /HPF        Imaging/Diagnostics Last 24 Hours   Xr Tibia Fibula Left (2 Views)    Result Date: 4/14/2021  EXAMINATION: 2 XRAY VIEWS OF THE LEFT TIBIA AND FIBULA 4/14/2021 9:14 pm COMPARISON: Left tibia/fibula series from April 9, 2021 HISTORY: ORDERING SYSTEM PROVIDED HISTORY: Mid shin wound TECHNOLOGIST PROVIDED HISTORY: Reason for exam:->Mid shin wound What reading provider will be dictating this exam?->CRC FINDINGS: Radiographs of the left tibia and fibula demonstrate no fracture with preserved alignment. No abnormal periosteal elevation. No evidence of osseous destruction. Limited evaluation of the left knee and ankle joints are unremarkable. Osseous mineralization is normal. There is superficial soft tissue stranding along the anterolateral aspect of the left lower leg. No definite evidence of subcutaneous air. Persistent soft tissue stranding in the lateral and anterior proximal to mid left shin. No underlying osseous abnormality seen. Assessment      Hospital Problems           Last Modified POA    Fever in adult 4/26/2021 Yes      LLE cellulitis  Asthma  Hypothyroidsm  Hyperlipidemia    Plan   Finishing vanc. ID to see. Monitor cultures and labs. Continue rest of home meds.     Consultations Ordered:  IP CONSULT TO INTERNAL MEDICINE  IP CONSULT TO RESPIRATORY CARE  IP CONSULT TO INFECTIOUS DISEASES

## 2021-04-27 NOTE — HOME CARE
Patient is active with [facility] for SN. Will need resumption of care orders if patient changes status to inpatient.  Thank you, [facility]

## 2021-04-28 VITALS
BODY MASS INDEX: 55.32 KG/M2 | RESPIRATION RATE: 16 BRPM | SYSTOLIC BLOOD PRESSURE: 145 MMHG | HEIGHT: 61 IN | WEIGHT: 293 LBS | OXYGEN SATURATION: 96 % | HEART RATE: 66 BPM | DIASTOLIC BLOOD PRESSURE: 88 MMHG | TEMPERATURE: 98.4 F

## 2021-04-28 LAB
ANION GAP SERPL CALCULATED.3IONS-SCNC: 14 MMOL/L (ref 7–16)
BUN BLDV-MCNC: 9 MG/DL (ref 6–20)
CALCIUM SERPL-MCNC: 8.9 MG/DL (ref 8.6–10.2)
CHLORIDE BLD-SCNC: 103 MMOL/L (ref 98–107)
CO2: 24 MMOL/L (ref 22–29)
CREAT SERPL-MCNC: 0.9 MG/DL (ref 0.5–1)
GFR AFRICAN AMERICAN: >60
GFR NON-AFRICAN AMERICAN: >60 ML/MIN/1.73
GLUCOSE BLD-MCNC: 132 MG/DL (ref 74–99)
HCT VFR BLD CALC: 38.7 % (ref 34–48)
HEMOGLOBIN: 12.4 G/DL (ref 11.5–15.5)
MCH RBC QN AUTO: 29.4 PG (ref 26–35)
MCHC RBC AUTO-ENTMCNC: 32 % (ref 32–34.5)
MCV RBC AUTO: 91.7 FL (ref 80–99.9)
PDW BLD-RTO: 14.5 FL (ref 11.5–15)
PLATELET # BLD: 196 E9/L (ref 130–450)
PMV BLD AUTO: 9.6 FL (ref 7–12)
POTASSIUM SERPL-SCNC: 3.5 MMOL/L (ref 3.5–5)
RBC # BLD: 4.22 E12/L (ref 3.5–5.5)
SODIUM BLD-SCNC: 141 MMOL/L (ref 132–146)
WBC # BLD: 4.2 E9/L (ref 4.5–11.5)

## 2021-04-28 PROCEDURE — 6370000000 HC RX 637 (ALT 250 FOR IP): Performed by: FAMILY MEDICINE

## 2021-04-28 PROCEDURE — 6360000002 HC RX W HCPCS: Performed by: FAMILY MEDICINE

## 2021-04-28 PROCEDURE — 80048 BASIC METABOLIC PNL TOTAL CA: CPT

## 2021-04-28 PROCEDURE — 36415 COLL VENOUS BLD VENIPUNCTURE: CPT

## 2021-04-28 PROCEDURE — 85027 COMPLETE CBC AUTOMATED: CPT

## 2021-04-28 RX ADMIN — Medication 1 TABLET: at 08:43

## 2021-04-28 RX ADMIN — DULOXETINE HYDROCHLORIDE 60 MG: 60 CAPSULE, DELAYED RELEASE ORAL at 08:44

## 2021-04-28 RX ADMIN — ENOXAPARIN SODIUM 40 MG: 40 INJECTION SUBCUTANEOUS at 08:44

## 2021-04-28 RX ADMIN — GABAPENTIN 600 MG: 600 TABLET, FILM COATED ORAL at 08:44

## 2021-04-28 RX ADMIN — MONTELUKAST SODIUM 10 MG: 10 TABLET ORAL at 08:43

## 2021-04-28 RX ADMIN — LEVOTHYROXINE SODIUM 50 MCG: 0.05 TABLET ORAL at 06:51

## 2021-04-28 RX ADMIN — HYDROCHLOROTHIAZIDE 25 MG: 25 TABLET ORAL at 08:44

## 2021-04-28 ASSESSMENT — ENCOUNTER SYMPTOMS
APNEA: 0
ABDOMINAL PAIN: 0

## 2021-04-28 NOTE — CARE COORDINATION
[facility] was notified of the patients discharge although we still need home health care orders. Plan is for the patient to return home with her . Wound care was consulted prior to discharge.  I will follow

## 2021-04-28 NOTE — PROGRESS NOTES
Progress Note  Date:2021       Room:8423/84-A  Patient Micheline Giraldo     YOB: 1975     Age:46 y.o. Patient says she feels well today. Subjective    Subjective:  Symptoms:  Stable. No chest pain. Diet:  Adequate intake. Activity level: Normal.    Pain:  She reports no pain. Review of Systems   Constitutional: Negative for activity change and fever. HENT: Negative for congestion. Respiratory: Negative for apnea. Cardiovascular: Negative for chest pain. Gastrointestinal: Negative for abdominal pain. Skin: Positive for rash and wound. Hematological: Negative for adenopathy. Psychiatric/Behavioral: Negative for agitation. Objective         Vitals Last 24 Hours:  TEMPERATURE:  Temp  Av °F (36.7 °C)  Min: 97.3 °F (36.3 °C)  Max: 98.4 °F (36.9 °C)  RESPIRATIONS RANGE: Resp  Av  Min: 16  Max: 16  PULSE OXIMETRY RANGE: SpO2  Av.7 %  Min: 97 %  Max: 100 %  PULSE RANGE: Pulse  Av.3  Min: 73  Max: 82  BLOOD PRESSURE RANGE: Systolic (59AHI), LAST:361 , Min:139 , GYV:031   ; Diastolic (79XLR), WOB:47, Min:78, Max:89    I/O (24Hr): No intake or output data in the 24 hours ending 21 0630  Objective:  General Appearance:  Comfortable. Vital signs: (most recent): Blood pressure (!) 148/89, pulse 80, temperature 98.4 °F (36.9 °C), temperature source Temporal, resp. rate 16, height 5' 1\" (1.549 m), weight (!) 320 lb (145.2 kg), SpO2 100 %, not currently breastfeeding. No fever. Lungs:  Normal effort and normal respiratory rate. Breath sounds clear to auscultation. Heart: Normal rate. Regular rhythm. S1 normal and S2 normal.    Skin:  There is a rash.       Labs/Imaging/Diagnostics    Labs:  CBC:  Recent Labs     21  2100   WBC 4.9   RBC 4.24   HGB 12.7   HCT 39.2   MCV 92.5   RDW 14.4        CHEMISTRIES:  Recent Labs     21  2100      K 3.6      CO2 26   BUN 7   CREATININE 1.0   GLUCOSE 105* PT/INR:No results for input(s): PROTIME, INR in the last 72 hours. APTT:No results for input(s): APTT in the last 72 hours. LIVER PROFILE:  Recent Labs     04/26/21  2100   AST 30   ALT 36*   BILITOT 0.3   ALKPHOS 74       Imaging Last 24 Hours:  Xr Chest Portable    Result Date: 4/26/2021  EXAMINATION: ONE XRAY VIEW OF THE CHEST 4/26/2021 9:50 pm COMPARISON: Two-view study from 12/07/2017. HISTORY: ORDERING SYSTEM PROVIDED HISTORY: fever TECHNOLOGIST PROVIDED HISTORY: Reason for exam:->fever What reading provider will be dictating this exam?->CRC FINDINGS: Right PICC with tip in the SVC. Normal cardiomediastinal silhouette and pulmonary vasculature. No pneumothorax, pleural effusion or consolidative focal pneumonia. Lungs appear grossly clear bilaterally. No acute osseous abnormality. 4-level lower anterior cervical fusion hardware noted, intervally new. Negative single view chest for acute process. Assessment//Plan           Hospital Problems           Last Modified POA    Fever in adult 4/26/2021 Yes        Assessment:  (Fever in adult 4/26/2021 Yes     LLE cellulitis  Asthma  Hypothyroidsm  Hyperlipidemia  ). Plan:   (Off vanc per ID  Checking viral panels  Monitor labs and exam.).        Electronically signed by Jaida Jackson MD on 4/28/21 at 6:30 AM EDT

## 2021-04-28 NOTE — FLOWSHEET NOTE
Inpatient Wound Care(initial evaluation)  8423    Admit Date: 4/26/2021  9:53 PM    Reason for consult:  Left lower leg wound    Significant history: recent fall causing injury to left lower leg    Wound history: admitted with wound from home  Home care was following    Findings:     04/28/21 1220   Wound 04/15/21 Leg Left; Lower; Anterior   Date First Assessed/Time First Assessed: 04/15/21 1926   Present on Hospital Admission: No  Location: Leg  Wound Location Orientation: Left; Lower; Anterior   Wound Image    Dressing Status New dressing applied   Wound Cleansed Cleansed with saline   Dressing/Treatment Alginate  (stratasorb)   Wound Length (cm) 2.8 cm   Wound Width (cm) 4.4 cm   Wound Depth (cm) 1 cm   Wound Surface Area (cm^2) 12.32 cm^2   Change in Wound Size % (l*w) -54   Wound Volume (cm^3) 12.32 cm^3   Wound Healing % -54   Wound Assessment Pink/red   Drainage Amount None   Anette-wound Assessment   (dry crusty)     **Informed Consent**    The patient has given verbal consent to have photos taken of wound and inserted into their chart as part of their permanent medical record for purposes of documentation, treatment management and/or medical review. All Images taken on 4/28/21 of patient name: Debora Kinney were transmitted and stored on secured [employer] located within [facility] by a registered [employer] Mobile Application Device.      Plan:  Wound gel with opticell  Home care to follow  Discharge today      Dequan Andre 4/28/2021 12:32 PM

## 2021-04-28 NOTE — PROGRESS NOTES
Socioeconomic History    Marital status:      Spouse name: None    Number of children: None    Years of education: None    Highest education level: None   Occupational History    None   Social Needs    Financial resource strain: None    Food insecurity     Worry: None     Inability: None    Transportation needs     Medical: None     Non-medical: None   Tobacco Use    Smoking status: Never Smoker    Smokeless tobacco: Never Used   Substance and Sexual Activity    Alcohol use: No    Drug use: No    Sexual activity: None   Lifestyle    Physical activity     Days per week: None     Minutes per session: None    Stress: None   Relationships    Social connections     Talks on phone: None     Gets together: None     Attends Tenriism service: None     Active member of club or organization: None     Attends meetings of clubs or organizations: None     Relationship status: None    Intimate partner violence     Fear of current or ex partner: None     Emotionally abused: None     Physically abused: None     Forced sexual activity: None   Other Topics Concern    None   Social History Narrative    None     Tobacco: No  Alcohol: No  Pets: No  Travel: No    Family History:       Problem Relation Age of Onset    Heart Disease Mother     High Blood Pressure Mother     Diabetes Father    . Otherwise non-pertinent to the chief complaint. REVIEW OF SYSTEMS:    CONSTITUTIONAL:  No chills, fevers or night sweats. No loss of weight. EYES:  No double vision or drainage from eyes, ears or throat. HEENT:  No neck stiffness. No dysphagia. No drainage from eyes, ears or throat  RESPIRATORY:  No cough, productive sputum or hemoptysis. CARDIOVASCULAR:  No chest pain, palpitations, orthopnea or dyspnea on exertion. GASTROINTESTINAL:  No nausea, vomiting, diarrhea or constipation or hematochezia   GENITOURINARY:  No frequency burning dysuria or hematuria. INTEGUMENT/BREAST:  No rash or breast masses. HEMATOLOGIC/LYMPHATIC:  No lymphadenopathy or blood dyscrasics. ALLERGIC/IMMUNOLOGIC:  No anaphylaxis. ENDOCRINE:  No polyuria or polydipsia or temperature intolerance. MUSCULOSKELETAL:  No myalgia or arthralgia. Full ROM. NEUROLOGICAL:  No focal motor sensory deficit. BEHAVIOR/PSYCH:  No psychosis. PHYSICAL EXAM:    Vitals:    BP (!) 145/88   Pulse 66   Temp 98.4 °F (36.9 °C) (Oral)   Resp 16   Ht 5' 1\" (1.549 m)   Wt (!) 320 lb (145.2 kg)   LMP  (LMP Unknown)   SpO2 96%   BMI 60.46 kg/m²   Constitutional: The patient is awake, alert, and oriented. Skin: Warm and dry. No rashes were noted. HEENT: Eyes show round, and reactive pupils. No jaundice. Moist mucous membranes, no ulcerations, no thrush. Neck: Supple to movements. No lymphadenopathy. Chest: No use of accessory muscles to breathe. Symmetrical expansion. Auscultation reveals no wheezing, crackles, or rhonchi. Cardiovascular: S1 and S2 are rhythmic and regular. No murmurs appreciated. Abdomen: Positive bowel sounds to auscultation. Benign to palpation. No masses felt. No hepatosplenomegaly. Extremities: No clubbing, no cyanosis, no edema.   Lines: peripheral      CBC+dif:  Recent Labs     04/26/21  2100 04/28/21  0931   WBC 4.9 4.2*   HGB 12.7 12.4   HCT 39.2 38.7   MCV 92.5 91.7    196     Lab Results   Component Value Date    CRP 2.5 (H) 04/22/2021    CRP 5.5 (H) 04/15/2021     No results found for: CRPHS  Lab Results   Component Value Date    SEDRATE 55 (H) 04/22/2021    SEDRATE 51 (H) 04/15/2021    SEDRATE 40 (H) 04/14/2021     Lab Results   Component Value Date    ALT 36 (H) 04/26/2021    AST 30 04/26/2021    ALKPHOS 74 04/26/2021    BILITOT 0.3 04/26/2021     Lab Results   Component Value Date     04/26/2021    K 3.6 04/26/2021     04/26/2021    CO2 26 04/26/2021    BUN 7 04/26/2021    CREATININE 1.0 04/26/2021    GFRAA >60 04/26/2021    LABGLOM 59 04/26/2021    GLUCOSE 105 04/26/2021    GLUCOSE 116 can be followed as an outpt   · Check for parvovirus   · Wound care consult now  Before she leaves   · Check cultures  · Baseline ESR, CRP  · Monitor labs  · Will follow with you  · She can go home from my standpoint    Thank you for having us see this patient in consultation. I will be discussing this case with the treating physicians.       Electronically signed by Jia Davis MD on 4/28/2021 at 10:35 AM

## 2021-04-28 NOTE — DISCHARGE SUMMARY
Discharge Summary    Date: 4/28/2021  Patient Name: Kerrin Eisenmenger YOB: 1975 Age: 48 y.o. Admit Date: 4/26/2021  Discharge Date: 4/28/2021  Discharge Condition: Stable    Admission Diagnosis  Fever in adult (R50.9)     Discharge Diagnosis  Active Problems: Fever in adultResolved Problems: * No resolved hospital problems. [facility] Stay  Narrative of Hospital Course:  Patient admitted for fever after recent left leg cellulitis. Patient stopped on vanc. Viral cultures pending. Home with wound care off Abx. Consultants:  IP CONSULT TO INTERNAL MEDICINEIP CONSULT TO RESPIRATORY CAREIP CONSULT TO INFECTIOUS DISEASES    Surgeries/procedures Performed:       Treatments:           Discharge Plan/Disposition:  Home    Hospital/Incidental Findings Requiring Follow Up:    Patient Instructions:    Diet: Cardiac Diet    Activity:Activity as Tolerated  For number of days (if applicable): Other Instructions:    Provider Follow-Up:   No follow-ups on file.      Significant Diagnostic Studies:    Recent Labs:  Admission on 04/26/2021WBC                                         Date: 04/26/2021Value: 4.9         Ref range: 4.5 - 11.5 E9/L    Status: FinalRBC                                           Date: 04/26/2021Value: 4.24        Ref range: 3.50 - 5.50 E12/L  Status: FinalHemoglobin                                    Date: 04/26/2021Value: 12.7        Ref range: 11.5 - 15.5 g/dL   Status: FinalHematocrit                                    Date: 04/26/2021Value: 39.2        Ref range: 34.0 - 48.0 %      Status: FinalMCV                                           Date: 04/26/2021Value: 92.5        Ref range: 80.0 - 99.9 fL     Status: [address]                                           Date: 04/26/2021Value: 30.0        Ref range: 26.0 - 35.0 pg     Status: [address]                                          Date: 04/26/2021Value: 32.4        Ref range: 32.0 - 34.5 %      Status: FinalRDW Date: 04/26/2021Value: 14.4        Ref range: 11.5 - 15.0 fL     Status: FinalPlatelets                                     Date: 04/26/2021Value: 220         Ref range: 130 - 450 E9/L     Status: FinalMPV                                           Date: 04/26/2021Value: 9.2         Ref range: 7.0 - 12.0 fL      Status: FinalSodium                                        Date: 04/26/2021Value: 137         Ref range: 132 - 146 mmol/L   Status: FinalPotassium                                     Date: 04/26/2021Value: 3.6         Ref range: 3.5 - 5.0 mmol/L   Status: FinalChloride                                      Date: 04/26/2021Value: 101         Ref range: 98 - 107 mmol/L    Status: FinalCO2                                           Date: 04/26/2021Value: 26          Ref range: 22 - 29 mmol/L     Status: FinalAnion Gap                                     Date: 04/26/2021Value: 10          Ref range: 7 - 16 mmol/L      Status: FinalGlucose                                       Date: 04/26/2021Value: 105*        Ref range: 74 - 99 mg/dL      Status: FinalBUN                                           Date: 04/26/2021Value: 7           Ref range: 6 - 20 mg/dL       Status: FinalCREATININE                                    Date: 04/26/2021Value: 1.0         Ref range: 0.5 - 1.0 mg/dL    Status: FinalGFR Non-                      Date: 04/26/2021Value: 59          Ref range: >=60 mL/min/1.73   Status: Final              Comment: Chronic Kidney Disease: less than 60 ml/min/1.73 sq.m. Kidney Failure: less than 15 ml/min/1.73 sq. m. Results valid for patients 18 years and older. GFR                           Date: 04/26/2021Value: >60           Status: FinalCalcium                                       Date: 04/26/2021Value: 8.8         Ref range: 8.6 - 10.2 mg/dL   Status: FinalTotal Protein                                 Date: 04/26/2021Value: 7.1 Ref range: 6.4 - 8.3 g/dL     Status: FinalAlbumin                                       Date: 04/26/2021Value: 3.7         Ref range: 3.5 - 5.2 g/dL     Status: FinalTotal Bilirubin                               Date: 04/26/2021Value: 0.3         Ref range: 0.0 - 1.2 mg/dL    Status: FinalAlkaline Phosphatase                          Date: 04/26/2021Value: 74          Ref range: 35 - 104 U/L       Status: FinalALT                                           Date: 04/26/2021Value: 36*         Ref range: 0 - 32 U/L         Status: FinalAST                                           Date: 04/26/2021Value: 30          Ref range: 0 - 31 U/L         Status: FinalBlood Culture, Routine                        Date: 04/26/2021Value: 24 Hours no growth                     Status: PreliminaryCulture, Blood 2                              Date: 04/26/2021Value: 24 Hours no growth                     Status: [id number], NAAT                              Date: 04/26/2021Value: Not Detected                   Ref range: Not Detected       Status: Final              Comment: Rapid NAAT:   Negative results should be treated as presumptive and,if inconsistent with clinical signs and symptoms or necessary forpatient management, should be tested with an alternative molecularassay. Negative results do not preclude SARS-CoV-2 infection andshould not be used as the sole basis for patient management decisions. This test has been authorized by the FDA under an Emergency UseAuthorization (EUA) for use by authorized laboratories. Fact sheet for [employer] sheet for Patients: YangCyndeedk: Isothermal Nucleic Acid AmplificationLactic Acid, Sepsis                           Date: 04/26/2021Value: 1.1         Ref range: 0.5 - 1.9 mmol/L   Status: FinalLactic Acid, Sepsis                           Date: 04/27/2021Value: 1.8         Ref range: 0.5 - 1.9 mmol/L   Status: FinalColor, UA                                     Date: 04/27/2021Value: Yellow      Ref range: Straw/Yellow       Status: FinalClarity, UA                                   Date: 04/27/2021Value: Clear       Ref range: Clear              Status: FinalGlucose, Ur                                   Date: 04/27/2021Value: Negative    Ref range: Negative mg/dL     Status: FinalBilirubin Urine                               Date: 04/27/2021Value: Negative    Ref range: Negative           Status: FinalKetones, Urine                                Date: 04/27/2021Value: Negative    Ref range: Negative mg/dL     Status: FinalSpecific Gravity, UA                          Date: 04/27/2021Value: >=1.030     Ref range: 1.005 - 1.030      Status: FinalBlood, Urine                                  Date: 04/27/2021Value: Negative    Ref range: Negative           Status: FinalpH, UA                                        Date: 04/27/2021Value: 6.0         Ref range: 5.0 - 9.0          Status: FinalProtein, UA                                   Date: 04/27/2021Value: TRACE       Ref range: Negative mg/dL     Status: FinalUrobilinogen, Urine                           Date: 04/27/2021Value: 0.2         Ref range: <2.0 E.U./dL       Status: FinalNitrite, Urine                                Date: 04/27/2021Value: Negative    Ref range: Negative           Status: FinalLeukocyte Esterase, Urine                     Date: 04/27/2021Value: Negative    Ref range: Negative           Status: [address] [facility], [facility]A                                       Date: 04/27/2021Value: 0-1         Ref range: 0 - 5 /HPF         Status: FinalRBC, UA                                       Date: 04/27/2021Value: 0-1         Ref range: 0 - 2 /HPF         Status: FinalEpithelial Cells, UA                          Date: 04/27/2021Value: RARE        Ref range: /HPF               Status: FinalBacteria, UA                                  Date: 04/27/2021Value: FEW*        Ref range: None Seen /HPF     Status: FinalYeast, UA                                     Date: 04/27/2021Value: Present*    Ref range: None Seen /HPF     Status: Final              Comment: Budding Yeast PresentWBC                                           Date: 04/28/2021Value: 4.2*        Ref range: 4.5 - 11.5 E9/L    Status: FinalRBC                                           Date: 04/28/2021Value: 4.22        Ref range: 3.50 - 5.50 E12/L  Status: FinalHemoglobin                                    Date: 04/28/2021Value: 12.4        Ref range: 11.5 - 15.5 g/dL   Status: FinalHematocrit                                    Date: 04/28/2021Value: 38.7        Ref range: 34.0 - 48.0 %      Status: FinalMCV                                           Date: 04/28/2021Value: 91.7        Ref range: 80.0 - 99.9 fL     Status: [facility]                                           Date: 04/28/2021Value: 29.4        Ref range: 26.0 - 35.0 pg     Status: [address]                                          Date: 04/28/2021Value: 32.0        Ref range: 32.0 - 34.5 %      Status: FinalRDW                                           Date: 04/28/2021Value: 14.5        Ref range: 11.5 - 15.0 fL     Status: FinalPlatelets                                     Date: 04/28/2021Value: 196         Ref range: 130 - 450 E9/L     Status: FinalMPV                                           Date: 04/28/2021Value: 9.6         Ref range: 7.0 - 12.0 fL      Status: FinalSodium                                        Date: 04/28/2021Value: 141         Ref range: 132 - 146 mmol/L   Status: FinalPotassium                                     Date: 04/28/2021Value: 3.5         Ref range: 3.5 - 5.0 mmol/L   Status: FinalChloride                                      Date: 04/28/2021Value: 103         Ref range: 98 - 107 mmol/L    Status: FinalCO2                                           Date: 04/28/2021Value: 24          Ref range: 22 - 29 mmol/L     Status: FinalAnion Gap                                     Date: 04/28/2021Value: 14          Ref range: 7 - 16 mmol/L      Status: FinalGlucose                                       Date: 04/28/2021Value: 132*        Ref range: 74 - 99 mg/dL      Status: FinalBUN                                           Date: 04/28/2021Value: 9           Ref range: 6 - 20 mg/dL       Status: FinalCREATININE                                    Date: 04/28/2021Value: 0.9         Ref range: 0.5 - 1.0 mg/dL    Status: FinalGFR Non-                      Date: 04/28/2021Value: >60         Ref range: >=60 mL/min/1.73   Status: Final              Comment: Chronic Kidney Disease: less than 60 ml/min/1.73 sq.m. Kidney Failure: less than 15 ml/min/1.73 sq. m. Results valid for patients 18 years and older. GFR                           Date: 04/28/2021Value: >60           Status: FinalCalcium                                       Date: 04/28/2021Value: 8.9         Ref range: 8.6 - 10.2 mg/dL   Status: Final------------    Radiology last 7 days:  Xr Chest PortableResult Date: 4/26/2021Negative single view chest for acute process. Pending Labs   Order Current Status  Bartonella Henselae ([place]) Antibodies IgG & IgM by IFA In process  CMV DNA, quantitative, PCR In process  Parvovirus B19 Antibody, IgG and IgM In process  Parvovirus B19 Antibody, IgG and IgM In process  Culture, Blood 1 Preliminary result  Culture, Blood 2 Preliminary result      Discharge Medications    Current Discharge Medication List    Current Discharge Medication List    Current Discharge Medication ListCONTINUE these medications which have NOT CHANGEDgabapentin (NEURONTIN) 600 MG tabletTake 600 mg by mouth 2 times daily. DULoxetine (CYMBALTA) 60 MG extended release capsuleTake 1 capsule by mouth dailyQty: 30 capsule Refills: 3melatonin 5 MG TBDP disintegrating tabletTake 1 tablet by mouth nightlyQty: 30 tablet Refills: 0hydroCHLOROthiazide (HYDRODIURIL) 25 MG tabletTake 25 mg by mouth dailyMultiple Vitamins-Minerals (THERAPEUTIC MULTIVITAMIN-MINERALS) tabletTake 1 tablet by mouth dailyalbuterol sulfate HFA (VENTOLIN HFA) 108 (90 Base) MCG/ACT inhalerInhale 2 puffs into the lungs every 6 hours as needed for Wheezingdocusate sodium (COLACE) 100 MG capsuleTake 100 mg by mouth daily Cholecalciferol (VITAMIN D) 2000 UNITS CAPS capsuleTake 5,000 Units by mouth Levothyroxine Sodium (SYNTHROID PO)Take 50 mcg by mouth daily montelukast (SINGULAIR) 10 MG tabletTake 10 mg by mouth daily     Current Discharge Medication ListSTOP taking these medicationsvancomycin (VANCOCIN) infusionComments:Reason for Stopping:Icosapent Ethyl (VASCEPA) 1 g CAPS capsuleComments:Reason for Stopping:ibuprofen (ADVIL;MOTRIN) 800 MG tabletComments:Reason for Stopping:    Time Spent on Discharge:1E] minutes were spent in patient examination, evaluation, counseling as well as medication reconciliation, prescriptions for required medications, discharge plan, and follow up.     Electronically signed by Fuentes Whitley MD on 4/28/21 at 11:32 AM EDT

## 2021-04-29 LAB
PARVOVIRUS B19 IGG ANTIBODY: 3.29 IV
PARVOVIRUS B19 IGG ANTIBODY: 3.92 IV
PARVOVIRUS B19 IGM ANTIBODY: 0.5 IV
PARVOVIRUS B19 IGM ANTIBODY: 0.51 IV

## 2021-04-30 ENCOUNTER — OFFICE VISIT (OUTPATIENT)
Dept: SURGERY | Age: 46
End: 2021-04-30
Payer: OTHER GOVERNMENT

## 2021-04-30 VITALS
TEMPERATURE: 97.8 F | WEIGHT: 293 LBS | BODY MASS INDEX: 55.32 KG/M2 | RESPIRATION RATE: 16 BRPM | HEIGHT: 61 IN | HEART RATE: 77 BPM | DIASTOLIC BLOOD PRESSURE: 82 MMHG | SYSTOLIC BLOOD PRESSURE: 145 MMHG

## 2021-04-30 DIAGNOSIS — Z51.89 VISIT FOR WOUND CHECK: Primary | ICD-10-CM

## 2021-04-30 LAB
BARTONELLA HENSELAE AB, IGG: NORMAL
BARTONELLA HENSELAE AB, IGM: NORMAL

## 2021-04-30 PROCEDURE — 99213 OFFICE O/P EST LOW 20 MIN: CPT | Performed by: SURGERY

## 2021-04-30 NOTE — PROGRESS NOTES
[facility]  General Surgery Attending Progress Note    Chief Complaint: wound check       Subjective:   Pt complains of minimal pain. She underwent bedside I&D of LLE on 4/15 in the hospital  She has been taking nothing for pain. Pain is  controlled. No fevers. No chills  She is off abx    I have reviewed and confirmed the past medical history, surgical history, social history, allergies in the chart. Medications: I have reviewed the medication list in the chart. Review of Systems - History obtained from the patient  General ROS: negative  Psychological ROS: negative  Ophthalmic ROS: negative  Allergy and Immunology ROS: negative  Hematological and Lymphatic ROS: negative  Endocrine ROS: negative  Breast ROS: negative  Respiratory ROS: negative  Cardiovascular ROS: negative  Gastrointestinal ROS: positive for - abdominal pain and gas/bloating  Genito-Urinary ROS: negative  Musculoskeletal ROS: negative      Objective:     Vitals:    04/30/21 1041   BP: (!) 145/82   Pulse: 77   Resp: 16   Temp: 97.8 °F (36.6 °C)     Physical Exam  Constitutional:       Appearance: Normal appearance. HENT:      Head: Normocephalic and atraumatic. Nose: Nose normal.      Mouth/Throat:      Mouth: Mucous membranes are dry. Eyes:      Extraocular Movements: Extraocular movements intact. Conjunctiva/sclera: Conjunctivae normal.      Pupils: Pupils are equal, round, and reactive to light. Neck:      Musculoskeletal: Normal range of motion and neck supple. Cardiovascular:      Rate and Rhythm: Normal rate and regular rhythm. Pulmonary:      Effort: Pulmonary effort is normal.      Breath sounds: Normal breath sounds. Abdominal:      General: Abdomen is flat. Palpations: Abdomen is soft. Skin:     Comments: LLE wound 2 x 3 cm. Good granulation. No cellulitis   Neurological:      Mental Status: She is alert.            Assessment:     Patient Active Problem List   Diagnosis    OCD (obsessive

## 2021-05-01 LAB
BLOOD CULTURE, ROUTINE: NORMAL
CULTURE, BLOOD 2: NORMAL

## 2021-05-05 NOTE — PROGRESS NOTES
Physician Progress Note      PATIENT:               Dalila Shaw  CSN #:                  [id number]  :                       1975  ADMIT DATE:       2021 9:53 PM  [address] DATE:        2021 1:47 PM  RESPONDING  PROVIDER #:        Bel Rodriguez MD          QUERY TEXT:    Pt admitted with Fever/rash and was just discharged on vancomycin for a severe   LLE cellulitis, states she had a fever above 101 last night per H&P. If   possible, please document in progress notes and discharge summary if you are   evaluating and/or treating any of the following: The medical record reflects the following:  Risk Factors: PICC line receiving vanco for tx of cellulitis PTA, Depression,   Fibromyalgia, High cholesterol, Irritable bowel, Mitral valve prolapse, PONV,   Prediabetes, and Spinal stenosis. Clinical Indicators: Temp = 97.1, = 97.6. WBC 4.9, Lactic 1.1,   Parvovirus B19 Ab IgG 3.92, Blood cultures - x 5 days.  ID consult- \"saw   her two weeks ago for LLE cellulitis  She has been on home IV vanco  She comes   in now with a fever and a lace like rash. LLE cellulitis resolving she still   has a wound. No surrounding cellulitis. She has a cat and a dog. ? vanco rash  ? Livedo reticularis. \"  Treatment: IV vancomycin and cefipime, Vancomycin later stopped, Labs,   Parvovirus, Wound care. Thank you,  Rosita Saab RN  Clinical   [phone]  Options provided:  -- Bacterial infection related to cellulitis  -- Viral infection related to Parvovirus  -- Fever of unknown etiology with empiric antimicrobial therapy  -- Other - I will add my own diagnosis  -- Disagree - Not applicable / Not valid  -- Disagree - Clinically unable to determine / Unknown  -- Refer to Clinical Documentation Reviewer    PROVIDER RESPONSE TEXT:    This patient has a bacterial infection related to cellulitis.     Query created by: Toshia Louis on 2021 1:14 PM      Electronically signed by: Kaleb Cevallos MD 5/5/2021 5:52 PM

## 2021-05-07 LAB
CMV DNA QNT PCR: <2.6 LOG CPY/ML
CMV DNA QUANTATATIVE INTERPRETATION: <2.4 LOG IU/ML
CMV DNA QUANTATATIVE INTERPRETATION: NOT DETECTED
CMV DNA QUANTITATIVE: <227 IU/ML
CMV SOURCE: NORMAL
CMVQ COPY/ML: <390 CPY/ML

## 2021-05-28 ENCOUNTER — OFFICE VISIT (OUTPATIENT)
Dept: SURGERY | Age: 46
End: 2021-05-28
Payer: OTHER GOVERNMENT

## 2021-05-28 VITALS
HEART RATE: 69 BPM | WEIGHT: 293 LBS | BODY MASS INDEX: 55.32 KG/M2 | DIASTOLIC BLOOD PRESSURE: 99 MMHG | SYSTOLIC BLOOD PRESSURE: 163 MMHG | HEIGHT: 61 IN | RESPIRATION RATE: 16 BRPM | TEMPERATURE: 98.2 F | OXYGEN SATURATION: 98 %

## 2021-05-28 DIAGNOSIS — Z51.89 VISIT FOR WOUND CHECK: Primary | ICD-10-CM

## 2021-05-28 PROCEDURE — 99213 OFFICE O/P EST LOW 20 MIN: CPT | Performed by: SURGERY

## 2021-05-28 NOTE — PROGRESS NOTES
and dry. Neurological:      Mental Status: She is alert. Assessment:     Patient Active Problem List   Diagnosis    OCD (obsessive compulsive disorder)    JOI (obstructive sleep apnea)    Morbid obesity due to excess calories (HCC)    Acquired hypothyroidism    Spinal stenosis    Fibromyalgia    Overflow incontinence    Mild intermittent asthma without complication    MVP (mitral valve prolapse)    Hyperglycemia    Seasonal affective disorder ([address]    Near syncope    TIA involving left internal carotid artery    Cellulitis of left lower extremity    Fever in adult         Plan:     Wound is healing nicely  Continue to cover with band-aid  Follow up PRN        On this date 5/28/2021 I have spent 20 minutes reviewing previous notes, test results and face to face with the patient discussing the diagnosis and importance of compliance with the treatment plan as well as documenting on the day of the visit. Carlita Sainz MD, FACS  5/28/2021  9:52 AM      NOTE: This report was transcribed using voice recognition software. Every effort was made to ensure accuracy; however, inadvertent computerized transcription errors may be present.

## 2021-05-31 NOTE — PROGRESS NOTES
Physician Progress Note      PATIENT:               Chente Broussard  CSN #:                  [id number]  :                       1975  ADMIT DATE:       2021 9:17 PM  [address] DATE:        2021 5:59 PM  RESPONDING  PROVIDER #:        DANAE MANZANO DO          QUERY TEXT:    Patient admitted with Left lower extremity cellulitis/abscess. Per Procedure   note dated  documentation of I&D. To accurately reflect the procedure performed please further specify the depth   of tissue incised and drained: The medical record reflects the following:  Risk Factors: Asthma, HLD, Hypothyroidsm  Clinical Indicators:  Procedure note- \"The patient was positioned   appropriately and the skin over the incision site was prepped with betadine   and draped in a sterile fashion. Local anesthesia was obtained by infiltration   using 20 cc of 1% Lidocaine with epinephrine. An transvers incision was then   made over the apex of the lesion and approximately 8 cc of bloody material   was expressed. A vertical incision was then made forming a cruciate incision. The four corners of skin edge were injected with anesthetic and cut away with   scissors. Loculations were not present. The gonzalo  Treatment: I&D, Monitoring, Labs, Consults, IV Vancomycin & Zosyn, PICC   placement    Thank you,  Anthony Blackwell RN  Clinical   [phone]  Options provided:  -- Skin only  -- Subcutaneous tissue  -- Fascia  -- Muscle  -- Joint  -- Bone  -- Other - I will add my own diagnosis  -- Disagree - Not applicable / Not valid  -- Disagree - Clinically unable to determine / Unknown  -- Refer to Clinical Documentation Reviewer    PROVIDER RESPONSE TEXT:    Addendum to 2021 procedure note The depth of the drainage to LLE was down   to and including subcutaneous tissue.     Query created by: Silke Love on 2021 12:36 PM      Electronically signed by:  Cj MANZANO DO 2021 5:55 AM

## 2021-07-14 ENCOUNTER — APPOINTMENT (OUTPATIENT)
Dept: GENERAL RADIOLOGY | Age: 46
End: 2021-07-14
Payer: OTHER GOVERNMENT

## 2021-07-14 ENCOUNTER — TELEPHONE (OUTPATIENT)
Dept: ADMINISTRATIVE | Age: 46
End: 2021-07-14

## 2021-07-14 ENCOUNTER — HOSPITAL ENCOUNTER (EMERGENCY)
Age: 46
Discharge: HOME | End: 2021-07-14
Attending: EMERGENCY MEDICINE
Payer: OTHER GOVERNMENT

## 2021-07-14 VITALS
HEIGHT: 60 IN | RESPIRATION RATE: 16 BRPM | BODY MASS INDEX: 57.52 KG/M2 | HEART RATE: 93 BPM | OXYGEN SATURATION: 98 % | TEMPERATURE: 96.6 F | DIASTOLIC BLOOD PRESSURE: 69 MMHG | WEIGHT: 293 LBS | SYSTOLIC BLOOD PRESSURE: 125 MMHG

## 2021-07-14 DIAGNOSIS — J45.41 MODERATE PERSISTENT ASTHMA WITH EXACERBATION: Primary | ICD-10-CM

## 2021-07-14 LAB
EKG ATRIAL RATE: 91 BPM
EKG P AXIS: 58 DEGREES
EKG P-R INTERVAL: 148 MS
EKG Q-T INTERVAL: 410 MS
EKG QRS DURATION: 96 MS
EKG QTC CALCULATION (BAZETT): 504 MS
EKG R AXIS: -23 DEGREES
EKG T AXIS: 13 DEGREES
EKG VENTRICULAR RATE: 91 BPM
SARS-COV-2, NAAT: NOT DETECTED

## 2021-07-14 PROCEDURE — 99284 EMERGENCY DEPT VISIT MOD MDM: CPT

## 2021-07-14 PROCEDURE — 93010 ELECTROCARDIOGRAM REPORT: CPT | Performed by: INTERNAL MEDICINE

## 2021-07-14 PROCEDURE — 93005 ELECTROCARDIOGRAM TRACING: CPT | Performed by: EMERGENCY MEDICINE

## 2021-07-14 PROCEDURE — 71046 X-RAY EXAM CHEST 2 VIEWS: CPT

## 2021-07-14 PROCEDURE — 6370000000 HC RX 637 (ALT 250 FOR IP): Performed by: EMERGENCY MEDICINE

## 2021-07-14 PROCEDURE — 87635 SARS-COV-2 COVID-19 AMP PRB: CPT

## 2021-07-14 RX ORDER — METFORMIN HYDROCHLORIDE 500 MG/1
1500 TABLET, EXTENDED RELEASE ORAL
COMMUNITY
End: 2022-03-08

## 2021-07-14 RX ORDER — PREDNISONE 20 MG/1
60 TABLET ORAL ONCE
Status: COMPLETED | OUTPATIENT
Start: 2021-07-14 | End: 2021-07-14

## 2021-07-14 RX ORDER — LORATADINE AND PSEUDOEPHEDRINE SULFATE 5; 120 MG/1; MG/1
1 TABLET, EXTENDED RELEASE ORAL 2 TIMES DAILY
Qty: 20 TABLET | Refills: 0 | Status: SHIPPED | OUTPATIENT
Start: 2021-07-14 | End: 2021-07-14 | Stop reason: SDUPTHER

## 2021-07-14 RX ORDER — LORATADINE AND PSEUDOEPHEDRINE SULFATE 5; 120 MG/1; MG/1
1 TABLET, EXTENDED RELEASE ORAL 2 TIMES DAILY
Qty: 20 TABLET | Refills: 0 | Status: SHIPPED | OUTPATIENT
Start: 2021-07-14 | End: 2021-08-23

## 2021-07-14 RX ORDER — PREDNISONE 20 MG/1
TABLET ORAL
Qty: 18 TABLET | Refills: 0 | Status: SHIPPED | OUTPATIENT
Start: 2021-07-14 | End: 2021-07-24

## 2021-07-14 RX ORDER — IPRATROPIUM BROMIDE AND ALBUTEROL SULFATE 2.5; .5 MG/3ML; MG/3ML
1 SOLUTION RESPIRATORY (INHALATION)
Status: DISCONTINUED | OUTPATIENT
Start: 2021-07-14 | End: 2021-07-14 | Stop reason: HOSPADM

## 2021-07-14 RX ADMIN — IPRATROPIUM BROMIDE AND ALBUTEROL SULFATE 1 AMPULE: .5; 2.5 SOLUTION RESPIRATORY (INHALATION) at 00:41

## 2021-07-14 RX ADMIN — PREDNISONE 60 MG: 20 TABLET ORAL at 00:41

## 2021-07-14 ASSESSMENT — PAIN DESCRIPTION - PAIN TYPE: TYPE: CHRONIC PAIN

## 2021-07-14 ASSESSMENT — PAIN SCALES - GENERAL: PAINLEVEL_OUTOF10: 10

## 2021-07-14 ASSESSMENT — PAIN DESCRIPTION - DIRECTION: RADIATING_TOWARDS: BIL LEGS

## 2021-07-14 ASSESSMENT — PAIN DESCRIPTION - DESCRIPTORS: DESCRIPTORS: SHARP

## 2021-07-14 NOTE — ED NOTES
Instructions provided and questions answered. Prescriptions verified with patient.       Noah Gonzalez RN  07/14/21 [id number]

## 2021-07-14 NOTE — ED PROVIDER NOTES
HPI:  7/14/21, Time: 12:31 AM EDT        Ban Romo is a 48 y.o. female presenting to the ED for shortness of breath, cough beginning several weeks ago. The complaint has been intermittent, moderate in severity, and worsened by nothing, moderate exertion. Patient states that she is scheduled to see a pulmonologist at the [facility] in September. Does not been any report of any fever nor any colored sputum production hemoptysis no leg swelling no chest pain. No relieving factors are reported. Patient states she has been on multiple rounds of corticosteroids as well as multiple antibiotics without significant improvement. Review of Systems:   A complete review of systems was performed and pertinent positives and negatives are stated within HPI, all other systems reviewed and are negative.    --------------------------------------------- PAST HISTORY ---------------------------------------------  Past Medical History:  has a past medical history of Asthma, Bladder leak, Depression, Fibromyalgia, High cholesterol, Irritable bowel, Mitral valve prolapse, PONV (postoperative nausea and vomiting), Prediabetes, and Spinal stenosis. Past Surgical History:  has a past surgical history that includes Hysterectomy; laparoscopy; Colonoscopy; Dilatation, esophagus; Endoscopy, colon, diagnostic; and Cystoscopy (N/A, 9/18/2020). Social History:  reports that she has never smoked. She has never used smokeless tobacco. She reports that she does not drink alcohol and does not use drugs. Family History: family history includes Diabetes in her father; Heart Disease in her mother; High Blood Pressure in her mother. The patients home medications have been reviewed.     Allergies: Latex, Adhesive tape, Aspirin, Benadryl [diphenhydramine hcl], Codeine, Demerol, Famotidine, and Phenergan [promethazine hcl]    -------------------------------------------------- RESULTS -------------------------------------------------  All laboratory and radiology results have been personally reviewed by myself   LABS:  Results for orders placed or performed during the hospital encounter of 07/14/21   COVID-19, Rapid    Specimen: Nasopharyngeal Swab   Result Value Ref Range    SARS-CoV-2, NAAT Not Detected Not Detected       RADIOLOGY:  Interpreted by Radiologist.  XR CHEST (2 VW)   Final Result   No acute process. ------------------------- NURSING NOTES AND VITALS REVIEWED ---------------------------    The nursing notes within the ED encounter and vital signs as below have been reviewed. /69   Pulse 93   Temp 96.6 °F (35.9 °C) (Temporal)   Resp 16   Ht 5' (1.524 m)   Wt (!) 324 lb (147 kg)   LMP  (LMP Unknown)   SpO2 98%   BMI 63.28 kg/m²   Oxygen Saturation Interpretation: Normal    ---------------------------------------------------PHYSICAL EXAM--------------------------------------    Constitutional/General: Alert and oriented x3, well appearing, non toxic in NAD  Head: Normocephalic and atraumatic  Eyes: PERRL, EOMI  Mouth: Oropharynx clear, handling secretions, no trismus  Neck: Supple, full ROM, no JVD. Trachea midline   Pulmonary: Lungs clear to auscultation bilaterally, no wheezes, rales, or rhonchi. Not in respiratory distress  Cardiovascular:  Regular rate and rhythm, no murmurs, gallops, or rubs. 2+ distal pulses  Abdomen: Soft, non tender, non distended, otherwise normal  Extremities: Moves all extremities x 4.  Warm and well perfused  Skin: warm and dry without rash  Neurologic: GCS 15, cranial nerves grossly intact no focal deficits no meningeal signs  Psych: Normal Affect      ------------------------------ ED COURSE/MEDICAL DECISION MAKING----------------------  Medications   ipratropium-albuterol (DUONEB) nebulizer solution 1 ampule (1 ampule Inhalation Given 7/14/21 0041)   predniSONE (DELTASONE) tablet 60 mg (60 mg Oral Given 7/14/21 0041)       ED COURSE:     Medical Decision Making:   Chest x-ray shows no focal infiltrates no evidence of pneumonia no mediastinal abnormalities to suggest aortic dissection. EKG #1:  Interpreted by emergency department physician unless otherwise noted. Time:  0:31    Rate: 90 bpm  Rhythm: Sinus rhythm. Interpretation: with nonspecific ST segment and T waves changes. Comparison: There are no significant changes when compared to prior tracings. Counseling: The emergency provider has spoken with the patient and discussed todays results, in addition to providing specific details for the plan of care and counseling regarding the diagnosis and prognosis. Questions are answered at this time and they are agreeable with the plan.    --------------------------------- IMPRESSION AND DISPOSITION ---------------------------------    IMPRESSION  1. Moderate persistent asthma with exacerbation        DISPOSITION  Disposition: Discharge to home  Patient condition is stable      NOTE: This report was transcribed using voice recognition software.  Every effort was made to ensure accuracy; however, inadvertent computerized transcription errors may be present        Mary Pratt MD  07/14/21 2513

## 2021-08-23 ENCOUNTER — OFFICE VISIT (OUTPATIENT)
Dept: FAMILY MEDICINE CLINIC | Age: 46
End: 2021-08-23
Payer: OTHER GOVERNMENT

## 2021-08-23 VITALS
DIASTOLIC BLOOD PRESSURE: 84 MMHG | WEIGHT: 293 LBS | BODY MASS INDEX: 57.52 KG/M2 | TEMPERATURE: 97 F | SYSTOLIC BLOOD PRESSURE: 140 MMHG | OXYGEN SATURATION: 97 % | HEIGHT: 60 IN | HEART RATE: 96 BPM | RESPIRATION RATE: 22 BRPM

## 2021-08-23 DIAGNOSIS — E66.01 MORBID OBESITY (HCC): ICD-10-CM

## 2021-08-23 DIAGNOSIS — E87.6 HYPOKALEMIA: ICD-10-CM

## 2021-08-23 DIAGNOSIS — E03.9 HYPOTHYROIDISM, ADULT: ICD-10-CM

## 2021-08-23 DIAGNOSIS — I10 ESSENTIAL HYPERTENSION: ICD-10-CM

## 2021-08-23 DIAGNOSIS — R73.03 PREDIABETES: ICD-10-CM

## 2021-08-23 DIAGNOSIS — R94.31 ABNORMAL EKG: ICD-10-CM

## 2021-08-23 PROCEDURE — 99386 PREV VISIT NEW AGE 40-64: CPT | Performed by: NURSE PRACTITIONER

## 2021-08-23 PROCEDURE — 93000 ELECTROCARDIOGRAM COMPLETE: CPT | Performed by: NURSE PRACTITIONER

## 2021-08-23 RX ORDER — DULOXETIN HYDROCHLORIDE 30 MG/1
CAPSULE, DELAYED RELEASE ORAL
COMMUNITY
Start: 2021-06-08 | End: 2022-06-23

## 2021-08-23 RX ORDER — LISINOPRIL 40 MG/1
40 TABLET ORAL DAILY
Qty: 90 TABLET | Refills: 1 | Status: SHIPPED
Start: 2021-08-23 | End: 2021-09-27 | Stop reason: SINTOL

## 2021-08-23 RX ORDER — IPRATROPIUM BROMIDE AND ALBUTEROL SULFATE 2.5; .5 MG/3ML; MG/3ML
SOLUTION RESPIRATORY (INHALATION)
COMMUNITY
Start: 2021-06-09 | End: 2022-06-23

## 2021-08-23 SDOH — ECONOMIC STABILITY: FOOD INSECURITY: WITHIN THE PAST 12 MONTHS, THE FOOD YOU BOUGHT JUST DIDN'T LAST AND YOU DIDN'T HAVE MONEY TO GET MORE.: NEVER TRUE

## 2021-08-23 SDOH — ECONOMIC STABILITY: FOOD INSECURITY: WITHIN THE PAST 12 MONTHS, YOU WORRIED THAT YOUR FOOD WOULD RUN OUT BEFORE YOU GOT MONEY TO BUY MORE.: NEVER TRUE

## 2021-08-23 ASSESSMENT — ENCOUNTER SYMPTOMS
CHEST TIGHTNESS: 0
VOICE CHANGE: 0
ABDOMINAL PAIN: 0
CONSTIPATION: 0
COUGH: 0
EYE PAIN: 0
DIARRHEA: 0
TROUBLE SWALLOWING: 0
ABDOMINAL DISTENTION: 0
BLOOD IN STOOL: 0
WHEEZING: 0
PHOTOPHOBIA: 0
COLOR CHANGE: 0
NAUSEA: 0
SHORTNESS OF BREATH: 0
VOMITING: 0

## 2021-08-23 ASSESSMENT — SOCIAL DETERMINANTS OF HEALTH (SDOH): HOW HARD IS IT FOR YOU TO PAY FOR THE VERY BASICS LIKE FOOD, HOUSING, MEDICAL CARE, AND HEATING?: NOT HARD AT ALL

## 2021-08-23 NOTE — PROGRESS NOTES
Asha Batista is a 48 y.o. female who presents today for   Chief Complaint   Patient presents with    [facility]         HPI      Pt presents new to [facility], previous PCP was Jordyn Smith. Last appointment was a few months ago. Pt is cuurrently following at [facility]. Pt is Obese, BMI today is 63.90 and weight is 327 lbs. Pt will eventually be scheduled for Bariatric Surgery. Pt is currently on Metformin 500 mg daily for Prediabetes. Pt also has a h/o Hypothyroid and recently had Levothyroxine dosage increased to 75 mcg daily approximately 1 month ago , she will need repeat TSH today. Pt also has a h/o HTN and was recently started on Lisinopril 20 mg daily , pt was advised to continue the HCTZ 25 mg that she was previously on. BP today is elevated 165/76 and repeat 140/84      Pt stated she had an abnormal EKG and was advised she is most likely low in hypokalemic. Pt is currently asymptomatic. She was advised on need for repeat EKG and possible potassium supplement          PMFSH:  Patient's past medical, surgical, social and/or family history reviewed, updated in chart, and are non-contributory (unless otherwise stated). Medications and allergies also reviewed and updated in chart. Review of Systems  Review of Systems   Constitutional: Negative for activity change, appetite change, chills, diaphoresis, fatigue and fever. Obese   HENT: Negative for congestion, drooling, ear discharge, ear pain, facial swelling, hearing loss, mouth sores, nosebleeds, rhinorrhea, sinus pressure, sinus pain, sneezing, sore throat, tinnitus, trouble swallowing and voice change. Eyes: Negative for photophobia, pain, discharge, redness, itching and visual disturbance. Respiratory: Negative for apnea, cough, choking, chest tightness, shortness of breath, wheezing and stridor. Cardiovascular: Negative for chest pain, palpitations and leg swelling.         HTN/ abnormal EKG-repeat today   Gastrointestinal: Negative for abdominal distention, abdominal pain, anal bleeding, blood in stool, constipation, diarrhea, nausea, rectal pain and vomiting. Endocrine: Negative for cold intolerance, heat intolerance, polydipsia, polyphagia and polyuria. Prediabetes/Hypothyroid   Genitourinary: Negative for decreased urine volume, difficulty urinating, enuresis, flank pain, frequency, hematuria and urgency. Musculoskeletal: Negative for arthralgias, joint swelling and myalgias. Skin: Negative for color change, pallor and rash. Neurological: Negative for dizziness, tremors, seizures, syncope, facial asymmetry, speech difficulty, weakness, light-headedness, numbness and headaches. Hematological: Negative for adenopathy. Does not bruise/bleed easily. Hypokalemia   Psychiatric/Behavioral: Negative for decreased concentration, dysphoric mood, self-injury, sleep disturbance and suicidal ideas. The patient is not nervous/anxious. Physical Exam:    VS:  BP (!) 140/84   Pulse 96   Temp 97 °F (36.1 °C) (Temporal)   Resp 22   Ht 5' (1.524 m)   Wt (!) 327 lb 3.2 oz (148.4 kg)   LMP  (LMP Unknown)   SpO2 97%   BMI 63.90 kg/m²   LAST WEIGHT:  Wt Readings from Last 3 Encounters:   08/23/21 (!) 327 lb 3.2 oz (148.4 kg)   07/14/21 (!) 324 lb (147 kg)   05/28/21 (!) 316 lb (143.3 kg)     Physical Exam  Vitals and nursing note reviewed. Constitutional:       General: She is not in acute distress. Appearance: She is well-developed. She is obese. She is not ill-appearing, toxic-appearing or diaphoretic. Comments: BMI 63.90   HENT:      Head: Normocephalic and atraumatic. Right Ear: Tympanic membrane, ear canal and external ear normal.      Left Ear: Tympanic membrane, ear canal and external ear normal.      Nose: Nose normal. No congestion or rhinorrhea. Mouth/Throat:      Mouth: Mucous membranes are moist.      Pharynx: Oropharynx is clear.  No oropharyngeal exudate or posterior oropharyngeal erythema. Eyes:      General:         Right eye: No discharge. Left eye: No discharge. Extraocular Movements: Extraocular movements intact. Conjunctiva/sclera: Conjunctivae normal.      Pupils: Pupils are equal, round, and reactive to light. Neck:      Thyroid: No thyromegaly. Vascular: No JVD. Comments: No thyromegaly  Cardiovascular:      Rate and Rhythm: Normal rate and regular rhythm. Pulses: Normal pulses. Heart sounds: Normal heart sounds. No murmur heard. No friction rub. Comments: No peripheral edema  Pulmonary:      Effort: Pulmonary effort is normal. No respiratory distress. Breath sounds: Normal breath sounds. No stridor. No wheezing, rhonchi or rales. Chest:      Chest wall: No tenderness. Abdominal:      Tenderness: There is no abdominal tenderness. Musculoskeletal:         General: No tenderness or deformity. Normal range of motion. Cervical back: Normal range of motion and neck supple. No rigidity or tenderness. Right lower leg: No edema. Left lower leg: No edema. Lymphadenopathy:      Cervical: No cervical adenopathy. Skin:     General: Skin is warm and dry. Coloration: Skin is not jaundiced or pale. Findings: No bruising, erythema, lesion or rash. Neurological:      Mental Status: She is alert and oriented to person, place, and time. Cranial Nerves: No cranial nerve deficit. Motor: No weakness or abnormal muscle tone. Gait: Gait normal.      Deep Tendon Reflexes: Reflexes are normal and symmetric. Psychiatric:         Mood and Affect: Mood normal.         Behavior: Behavior normal.         Thought Content: Thought content normal.         Judgment: Judgment normal.            Assessment / Plan:      Jim Yee was seen today for establish care.     Diagnoses and all orders for this visit:    Prediabetes  Continue current treatment plan    Essential hypertension  Will increase dosage of Lisinopril to 40 mg daily  -     lisinopril (PRINIVIL;ZESTRIL) 40 MG tablet; Take 1 tablet by mouth daily    Hypothyroidism, adult  Continue current dosage of Levothyroxine until TSH is reviewed  -     TSH without Reflex; Future    Hypokalemia  K+ level today  Further treatment plan will be based on lab once received  -     POTASSIUM; Future    Abnormal EKG  No further cardiac testing at this time  -     EKG 12 Lead    Morbid obesity ([address]  Treatment plan per Bariatrics at [facility] [place]       Call or go to ED immediately if symptoms worsen or persist.    Return in about 3 months (around 11/23/2021) for Prediabetes/HTN/Hypothyroid/Obesity. , or sooner if necessary. Educational materials and/or home exercises printed for patient's review and were included in patient instructions on his/her After Visit Summary and given to patient at the end of visit. Counseled regarding above diagnosis, including possible risks and complications,  especially if left uncontrolled. Counseled regarding the possible side effects, risks, benefits and alternatives to treatment; patient and/or guardian verbalizes understanding, agrees, feels comfortable with and wishes to proceed with above treatment plan. Advised patient to call with any new medication issues, and read all Rx info from pharmacy to assure aware of all possible risks and side effects of medication before taking. Reviewed age and gender appropriate health screening exams and vaccinations. Advised patient regarding importance of keeping up with recommended health maintenance and to schedule as soon as possible if overdue, as this is important in assessing for undiagnosed pathology, especially cancer, as well as protecting against potentially harmful/life threatening disease. Patient and/or guardian verbalizes understanding and agrees with above counseling, assessment and plan. All questions answered.     Skeet Hazel, APRN - CNP

## 2021-08-29 RX ORDER — LEVOTHYROXINE SODIUM 0.07 MG/1
75 TABLET ORAL DAILY
Qty: 90 TABLET | Refills: 1 | Status: SHIPPED
Start: 2021-08-29 | End: 2022-03-29 | Stop reason: SDUPTHER

## 2021-09-06 ASSESSMENT — ENCOUNTER SYMPTOMS
FACIAL SWELLING: 0
SINUS PRESSURE: 0
CHOKING: 0
ANAL BLEEDING: 0
EYE REDNESS: 0
STRIDOR: 0
APNEA: 0
SORE THROAT: 0
RECTAL PAIN: 0
EYE ITCHING: 0
EYE DISCHARGE: 0
RHINORRHEA: 0
SINUS PAIN: 0

## 2021-09-24 ENCOUNTER — TELEPHONE (OUTPATIENT)
Dept: FAMILY MEDICINE CLINIC | Age: 46
End: 2021-09-24

## 2021-09-24 NOTE — TELEPHONE ENCOUNTER
----- Message from Anival Whyte sent at 9/23/2021  4:47 PM EDT -----  Subject: Appointment Request    Reason for Call:     QUESTIONS  Type of Appointment? Established Patient  Reason for appointment request? Other - Pt is wanting to switch to Dr. Joy Hyatt   Additional Information for Provider?   ---------------------------------------------------------------------------  --------------  [address]  What is the best way for the office to contact you? OK to leave message on   voicemail  Preferred Call Back Phone Number?  [phone]  ---------------------------------------------------------------------------  --------------  SCRIPT ANSWERS

## 2021-09-24 NOTE — TELEPHONE ENCOUNTER
----- Message from Pathak Adriel sent at 9/23/2021  4:38 PM EDT -----  Subject: Medication Problem    QUESTIONS  Name of Medication? lisinopril (PRINIVIL;ZESTRIL) 40 MG tablet  Patient-reported dosage and instructions? 1 a day  What question or problem do you have with the medication? Per  at   [facility] has advised Pt. that this medicine side effect is a   cough. He informed her that she has asthma & whooping cough. .   Preferred Pharmacy? [facility] [address] [employer]  Pharmacy phone number (if available)? [phone]  Additional Information for Provider?   ---------------------------------------------------------------------------  --------------  [address]  What is the best way for the office to contact you? OK to leave message on   voicemail  Preferred Call Back Phone Number? [phone]  ---------------------------------------------------------------------------  --------------  SCRIPT ANSWERS  Relationship to Patient?  Self

## 2021-09-24 NOTE — TELEPHONE ENCOUNTER
Pt wanted to know if she can see Dr Mirta Muller whenever Vianne Six is not available. Advised her she can only have one PCP.  Pt voiced understanding

## 2021-09-24 NOTE — TELEPHONE ENCOUNTER
Patient was advised by pulmonologist that she may have cough due to lisinopril. She is asking if she can be switched to something else.

## 2021-09-27 RX ORDER — AMLODIPINE BESYLATE 10 MG/1
10 TABLET ORAL DAILY
Qty: 30 TABLET | Refills: 0 | Status: SHIPPED
Start: 2021-09-27 | End: 2021-10-25

## 2021-09-27 NOTE — TELEPHONE ENCOUNTER
Please let pt know to stop Lisinopril and I sent in Norvasc to her local pharmacy only 30 ay supply.  She needs to stop in office in 1 week for BP check only

## 2021-09-29 ENCOUNTER — HOSPITAL ENCOUNTER (EMERGENCY)
Age: 46
Discharge: HOME | End: 2021-09-29
Attending: EMERGENCY MEDICINE
Payer: OTHER GOVERNMENT

## 2021-09-29 VITALS
TEMPERATURE: 97 F | RESPIRATION RATE: 16 BRPM | WEIGHT: 293 LBS | HEIGHT: 61 IN | SYSTOLIC BLOOD PRESSURE: 130 MMHG | HEART RATE: 79 BPM | DIASTOLIC BLOOD PRESSURE: 67 MMHG | OXYGEN SATURATION: 99 % | BODY MASS INDEX: 55.32 KG/M2

## 2021-09-29 DIAGNOSIS — R22.1 SENSATION OF LUMP IN THROAT: Primary | ICD-10-CM

## 2021-09-29 PROCEDURE — 99284 EMERGENCY DEPT VISIT MOD MDM: CPT

## 2021-09-29 PROCEDURE — 6370000000 HC RX 637 (ALT 250 FOR IP): Performed by: EMERGENCY MEDICINE

## 2021-09-29 RX ORDER — SUCRALFATE ORAL 1 G/10ML
1 SUSPENSION ORAL 4 TIMES DAILY
Qty: 1200 ML | Refills: 0 | Status: SHIPPED | OUTPATIENT
Start: 2021-09-29 | End: 2022-03-08

## 2021-09-29 RX ORDER — SUCRALFATE ORAL 1 G/10ML
1 SUSPENSION ORAL 4 TIMES DAILY
Qty: 1200 ML | Refills: 0 | Status: SHIPPED | OUTPATIENT
Start: 2021-09-29 | End: 2021-09-29 | Stop reason: SDUPTHER

## 2021-09-29 RX ADMIN — LIDOCAINE HYDROCHLORIDE: 20 SOLUTION ORAL; TOPICAL at 11:43

## 2021-09-29 NOTE — ED PROVIDER NOTES
Department of Emergency Medicine   ED  Provider Note  Admit Date/RoomTime: 9/29/2021 10:14 AM  ED Room: 03/03 9/29/21  11:28 AM EDT    History of Present Illness:  Chief Complaint   Patient presents with    Other     Feels like has lump in her throat x 7 days. Saw PCP yesterday, sent to ED, waited too long in [facility] ED waiting room yesterday, so came here. Rosa Locke is a 48 y.o. female presenting to the ED for sensation of lump in her throat since last Thursday. The complaint has been persistent, moderate in severity, and worsened by nothing. Patient reports she would not do anything at onset she denies swallowing anything and feeling like anything was getting stuck. She reports is able to swallow without difficulty. She denies any pain. \"mucousy cough\", and frequent throat clearing throat since being given vancomycin for a leg infection in May. Saw he  r pulmomologist yesterday told her to go to ED. has a pulmonologist in [facility] she went to [facility] ED had an xray neck but by midnight hadnt been seen and left. Taking Alegra and Advair. She reports Bala [place] is a new medication she denies any other new medications. She does report that yesterday while chewing gum she experienced burning with swallowing. She recently was changed from lisinopril to another blood pressure medication due to the cough    Review of Systems:   Pertinent positives and negatives are stated within HPI, all other systems reviewed and are negative. Review of Systems         --------------------------------------------- PAST HISTORY ---------------------------------------------  Past Medical History:  has a past medical history of Asthma, Bladder leak, Depression, Fibromyalgia, High cholesterol, Irritable bowel, Mitral valve prolapse, PONV (postoperative nausea and vomiting), Prediabetes, and Spinal stenosis.     Past Surgical History:  has a past surgical history that includes Hysterectomy; laparoscopy; Palpations: Abdomen is soft. Tenderness: There is no abdominal tenderness. There is no guarding or rebound. Musculoskeletal:      Cervical back: Normal range of motion and neck supple. No tenderness. Lymphadenopathy:      Cervical: No cervical adenopathy. Skin:     General: Skin is warm and dry. Neurological:      Mental Status: She is alert and oriented to person, place, and time. Cranial Nerves: No cranial nerve deficit. Coordination: Coordination normal.            -------------------------------------------------- RESULTS -------------------------------------------------    LABS: (Lab results interpreted by me)  No results found for this visit on 09/29/21. All radiology results have been personally reviewed by myself   RADIOLOGY:  Interpreted by Radiologist.  No orders to display         ------------------------------ ED COURSE/MEDICAL DECISION MAKING----------------------  Medications   aluminum & magnesium hydroxide-simethicone (MAALOX) 30 mL, lidocaine viscous hcl (XYLOCAINE) 5 mL (GI COCKTAIL) ( Oral Given 9/29/21 1143)       Re-evaluations & Consults:  ED Course as of Sep 29 1322   Wed Sep 29, 2021   1218 Reports significant improvement in sensation comfortable discharge plan    [MF]      ED Course User Index  [MF] Yamilet Santos DO            Medical Decision Making:   XR NECK SOFT TISSUE 2V AP/LAT 9/28/21   IMPRESSION:   From C1 to C5 level, upper airway is patent. No retropharyngeal air or   opaque foreign body is seen. Patient with globus sensation for 1 week x-ray last night negative. Symptoms resolved after GI cocktail will prescribe Carafate follow-up with surgery to discuss EGD      This patient's ED course included: a personal history and physicial examination and re-evaluation prior to disposition    This patient has remained hemodynamically stable and improved during their ED course. Critical Care Time:       Counseling:    The emergency provider has spoken

## 2021-10-20 ENCOUNTER — TELEPHONE (OUTPATIENT)
Dept: FAMILY MEDICINE CLINIC | Age: 46
End: 2021-10-20

## 2021-10-20 NOTE — TELEPHONE ENCOUNTER
Pt called stated she did not have ECHO completed. Patient stated when she seen PCP and had EKG completed PCP stated she did not need ECHO. Pt stated ECHO was ordered by [facility] because pt is in the process of having bariatric sx done. Patients had a concerning EKG and CC odered ECHO. WHen pt f/u with PCP, had another EKG, mst recent EKG was ok , pt pt and PCP stated ECHO was not needed. Pt was instructed to f/u with [facility] and asked them based on her most recent EKG done with PCP if ECHO was still needed, since they ordered ECHO to be cleared for bariatric sx .  Patient voiced understanding, will call the office for EKG to be faxed to CC if they are unable to see results in system

## 2021-10-20 NOTE — TELEPHONE ENCOUNTER
----- Message from [facility] (DOMINGO JOHNSON) sent at 10/20/2021  3:30 PM EDT -----  Subject: Message to Provider    QUESTIONS  Information for Provider? pt would like a call from Edna Alves to discuss echo   ---------------------------------------------------------------------------  --------------  [address]  What is the best way for the office to contact you? OK to leave message on   voicemail  Preferred Call Back Phone Number? [phone]  ---------------------------------------------------------------------------  --------------  SCRIPT ANSWERS  Relationship to Patient?  Self

## 2021-10-25 RX ORDER — AMLODIPINE BESYLATE 10 MG/1
10 TABLET ORAL DAILY
Qty: 90 TABLET | Refills: 1 | Status: SHIPPED
Start: 2021-10-25 | End: 2022-03-29 | Stop reason: SDUPTHER

## 2021-10-26 ENCOUNTER — OFFICE VISIT (OUTPATIENT)
Dept: PRIMARY CARE CLINIC | Age: 46
End: 2021-10-26
Payer: OTHER GOVERNMENT

## 2021-10-26 VITALS
TEMPERATURE: 97.9 F | HEIGHT: 61 IN | BODY MASS INDEX: 55.32 KG/M2 | DIASTOLIC BLOOD PRESSURE: 74 MMHG | RESPIRATION RATE: 20 BRPM | WEIGHT: 293 LBS | SYSTOLIC BLOOD PRESSURE: 140 MMHG | OXYGEN SATURATION: 97 % | HEART RATE: 97 BPM

## 2021-10-26 DIAGNOSIS — R11.10 VOMITING, INTRACTABILITY OF VOMITING NOT SPECIFIED, PRESENCE OF NAUSEA NOT SPECIFIED, UNSPECIFIED VOMITING TYPE: ICD-10-CM

## 2021-10-26 DIAGNOSIS — R06.02 SOB (SHORTNESS OF BREATH): ICD-10-CM

## 2021-10-26 DIAGNOSIS — Z20.89 EXPOSURE TO PNEUMONIA: ICD-10-CM

## 2021-10-26 DIAGNOSIS — J02.9 SORE THROAT: Primary | ICD-10-CM

## 2021-10-26 DIAGNOSIS — R09.89 CHEST CONGESTION: ICD-10-CM

## 2021-10-26 LAB
INFLUENZA A ANTIGEN, POC: NEGATIVE
INFLUENZA B ANTIGEN, POC: NEGATIVE
Lab: NORMAL
PERFORMING INSTRUMENT: NORMAL
QC PASS/FAIL: NORMAL
SARS-COV-2, POC: NORMAL

## 2021-10-26 PROCEDURE — 87426 SARSCOV CORONAVIRUS AG IA: CPT | Performed by: NURSE PRACTITIONER

## 2021-10-26 PROCEDURE — 87804 INFLUENZA ASSAY W/OPTIC: CPT | Performed by: NURSE PRACTITIONER

## 2021-10-26 PROCEDURE — 99213 OFFICE O/P EST LOW 20 MIN: CPT | Performed by: NURSE PRACTITIONER

## 2021-10-26 RX ORDER — PREDNISONE 10 MG/1
10 TABLET ORAL 2 TIMES DAILY
Qty: 10 TABLET | Refills: 0 | Status: SHIPPED
Start: 2021-10-26 | End: 2021-10-28 | Stop reason: SDUPTHER

## 2021-10-26 RX ORDER — BENZONATATE 100 MG/1
100 CAPSULE ORAL 3 TIMES DAILY PRN
Qty: 20 CAPSULE | Refills: 0 | Status: SHIPPED | OUTPATIENT
Start: 2021-10-26 | End: 2021-11-02

## 2021-10-26 RX ORDER — DOXYCYCLINE HYCLATE 100 MG
100 TABLET ORAL 2 TIMES DAILY
Qty: 20 TABLET | Refills: 0 | Status: SHIPPED | OUTPATIENT
Start: 2021-10-26 | End: 2021-11-05

## 2021-10-26 NOTE — PROGRESS NOTES
Chief Complaint:   Cough (since monday patient states she tried taking mucinex. ), Pharyngitis, Nausea, and Shortness of Breath      History of Present Illness   Source of history provided by:  patient. Bon Hoffman is a 48 y.o. old female with a past medical history of:   Past Medical History:   Diagnosis Date    Asthma     Bladder leak     for OR 9-18-20     Depression     Fibromyalgia     High cholesterol     Irritable bowel     Mitral valve prolapse     f/u w/ PCP only, no issues, no treatment     PONV (postoperative nausea and vomiting)     Prediabetes     Spinal stenosis         Pt presents to the [facility] with a cough/congestion/nausea/sore throat/SOB  for the past few days. States the cough is  nonproductive. No fever noted. .  Denies any N/V/D, abdominal pain, CP,  dizziness, or lethargy. Pt denies recent or close exposure to individual w/Covid 19, pt stated her son was recently dx with Pneumonia      ROS    Unless otherwise stated in this report or unable to obtain because of the patient's clinical or mental status as evidenced by the medical record, this patients's positive and negative responses for Review of Systems, constitutional, psych, eyes, ENT, cardiovascular, respiratory, gastrointestinal, neurological, genitourinary, musculoskeletal, integument systems and systems related to the presenting problem are either stated in the preceding or were not pertinent or were negative for the symptoms and/or complaints related to the medical problem. Past Surgical History:  has a past surgical history that includes Hysterectomy; laparoscopy; Colonoscopy; Dilatation, esophagus; Endoscopy, colon, diagnostic; and Cystoscopy (N/A, 9/18/2020). Social History:  reports that she has never smoked. She has never used smokeless tobacco. She reports that she does not drink alcohol and does not use drugs.   Family History: family history includes Diabetes in her father; Heart Disease in her mother; High Blood Pressure in her mother. Allergies: Latex, Adhesive tape, Aspirin, Benadryl [diphenhydramine hcl], Codeine, Demerol, Famotidine, Pregabalin, and Phenergan [promethazine hcl]    Physical Exam         VS:  BP (!) 140/74 (Site: Left Lower Arm, Position: Sitting, Cuff Size: Large Adult)   Pulse 97   Temp 97.9 °F (36.6 °C) (Temporal)   Resp 20   Ht 5' 1\" (1.549 m)   Wt (!) 322 lb (146.1 kg)   LMP  (LMP Unknown)   SpO2 97%   BMI 60.84 kg/m²    Oxygen Saturation Interpretation: Normal.    Constitutional:  Alert, development consistent with age. Ears:  External Ears: Normal bilateral pinna. TM's & External Canals: TM's normal bilaterally without perforation. Canals without erythema or drainage. Nose:   There is no obvious septal defect. Moderate redness/edema to nasal mucosa, no active drainage  Mouth:  Moist bucca mucosa and normal tongue. Throat: Mild posterior pharyngeal erythema without exudates or lesions. Neck:  Supple. There is no obvious adenopathy or neck tenderness. Lungs:   Breath sounds: Normal chest expansion. Moderately decreased BS bilaterally, nonproductive cough present w/deep breathing  Heart:  Regular rate and rhythm, normal heart sounds, without pathological murmurs, ectopy, gallops, or rubs. Skin:  Normal turgor. Warm, dry, without visible rash. Neurological:  Oriented. Motor functions intact. Lab / Imaging Results   (All laboratory and radiology results have been personally reviewed by myself)  Labs:  Results for orders placed or performed in visit on 10/26/21   POCT Influenza A/B Antigen   Result Value Ref Range    Inflenza A Ag Negative     Influenza B Ag Negative    POCT COVID-19, Antigen   Result Value Ref Range    SARS-COV-2, POC Not-Detected Not Detected    Lot Number [id number]     QC Pass/Fail Pass     Performing Instrument BD Veritor        Imaging: All Radiology results interpreted by Radiologist unless otherwise noted.   No orders to display         Assessment / Plan     Impression(s):  1. Sore throat    2. Vomiting, intractability of vomiting not specified, presence of nausea not specified, unspecified vomiting type    3. SOB (shortness of breath)    4. Exposure to pneumonia    5. Chest congestion      Disposition:  Disposition: Start Doxycycline and Prednisone today, chest xray now, stay hydrated     ER if changes or worse. Advised to take all medications as directed.

## 2021-10-28 ENCOUNTER — PATIENT MESSAGE (OUTPATIENT)
Dept: FAMILY MEDICINE CLINIC | Age: 46
End: 2021-10-28

## 2021-10-28 DIAGNOSIS — Z20.89 EXPOSURE TO PNEUMONIA: ICD-10-CM

## 2021-10-28 DIAGNOSIS — R09.89 CHEST CONGESTION: ICD-10-CM

## 2021-10-28 DIAGNOSIS — R06.02 SOB (SHORTNESS OF BREATH): ICD-10-CM

## 2021-10-28 RX ORDER — PREDNISONE 10 MG/1
10 TABLET ORAL 2 TIMES DAILY
Qty: 10 TABLET | Refills: 0 | Status: SHIPPED | OUTPATIENT
Start: 2021-10-28 | End: 2021-11-02

## 2021-10-28 RX ORDER — PREDNISONE 10 MG/1
10 TABLET ORAL 2 TIMES DAILY
Qty: 10 TABLET | Refills: 0 | Status: CANCELLED | OUTPATIENT
Start: 2021-10-28 | End: 2021-11-02

## 2021-10-28 NOTE — TELEPHONE ENCOUNTER
From: Inna Luna  To: Geovanna Todd APRN - [place]  Sent: 10/28/2021 1:22 PM EDT  Subject: Non-Urgent Medical Question    Hello I have 2 days left of prednisone which will put me to the weekend and I'm concerned I need another round, can you please call in more prednisone?    Thank you

## 2021-11-04 ENCOUNTER — TELEPHONE (OUTPATIENT)
Dept: FAMILY MEDICINE CLINIC | Age: 46
End: 2021-11-04

## 2021-11-04 RX ORDER — NYSTATIN 100000 U/G
CREAM TOPICAL
Qty: 1 EACH | Refills: 3 | Status: SHIPPED | OUTPATIENT
Start: 2021-11-04

## 2021-11-04 NOTE — TELEPHONE ENCOUNTER
Nystatin cream sent to pharmacy, pt would need to have BP checked by nurses before any medication can be adjusted

## 2021-11-04 NOTE — TELEPHONE ENCOUNTER
Patient woke up with a yeast infection under both her breast and would like something called in.  Please Advise     Rosie Woodward for her echo this morning and her BP was high so Pt is not sure if meds need adjusted

## 2021-11-12 ENCOUNTER — TELEPHONE (OUTPATIENT)
Dept: FAMILY MEDICINE CLINIC | Age: 46
End: 2021-11-12

## 2021-11-12 NOTE — TELEPHONE ENCOUNTER
Pt called stated she was feeling fine at appt. Pt stated she test her oxygen level daily because she has a PosOx at home. She is just getting over bronchitis. She stated she has checked it at home recently and it was 87%, patient usually checks her at night because she keeps PosOx by bed. Stated her Oxygen level drops after long walks and activity. And she believes this was the case today at cardio office, because it was a far walk to the office. But pt is also concerned that her Oxygen level seems to drop at night before bed. Pt checked oxygen levels while on phone with office, at this time Oxygen level is 98%.  Please advise, pt made aware hat pcp will be running walk in clinic, and she will need to establish with new provider

## 2021-11-12 NOTE — TELEPHONE ENCOUNTER
Pt went to [facility] today and had an appt with her cardiologist. She states her pulse ox was 85 and was told to call her PCP for advice.

## 2022-01-13 ENCOUNTER — OFFICE VISIT (OUTPATIENT)
Dept: FAMILY MEDICINE CLINIC | Age: 47
End: 2022-01-13
Payer: OTHER GOVERNMENT

## 2022-01-13 VITALS
HEIGHT: 61 IN | OXYGEN SATURATION: 99 % | BODY MASS INDEX: 60.84 KG/M2 | SYSTOLIC BLOOD PRESSURE: 138 MMHG | TEMPERATURE: 97.7 F | RESPIRATION RATE: 18 BRPM | DIASTOLIC BLOOD PRESSURE: 72 MMHG | HEART RATE: 78 BPM

## 2022-01-13 DIAGNOSIS — R73.03 PREDIABETES: ICD-10-CM

## 2022-01-13 DIAGNOSIS — I10 ESSENTIAL HYPERTENSION: Primary | ICD-10-CM

## 2022-01-13 DIAGNOSIS — J45.20 MILD INTERMITTENT ASTHMA WITHOUT COMPLICATION: ICD-10-CM

## 2022-01-13 DIAGNOSIS — N39.490 OVERFLOW INCONTINENCE: ICD-10-CM

## 2022-01-13 DIAGNOSIS — F33.8 SEASONAL AFFECTIVE DISORDER (HCC): ICD-10-CM

## 2022-01-13 DIAGNOSIS — Z92.89 HISTORY OF ECHOCARDIOGRAM: ICD-10-CM

## 2022-01-13 DIAGNOSIS — E03.9 ACQUIRED HYPOTHYROIDISM: ICD-10-CM

## 2022-01-13 DIAGNOSIS — G47.33 OSA (OBSTRUCTIVE SLEEP APNEA): ICD-10-CM

## 2022-01-13 DIAGNOSIS — M48.061 SPINAL STENOSIS OF LUMBAR REGION, UNSPECIFIED WHETHER NEUROGENIC CLAUDICATION PRESENT: ICD-10-CM

## 2022-01-13 DIAGNOSIS — E78.2 MIXED HYPERLIPIDEMIA: ICD-10-CM

## 2022-01-13 PROBLEM — G43.909 MIGRAINE HEADACHE: Status: ACTIVE | Noted: 2020-12-28

## 2022-01-13 PROBLEM — M62.838 MUSCLE SPASMS OF BOTH LOWER EXTREMITIES: Status: ACTIVE | Noted: 2021-07-09

## 2022-01-13 PROBLEM — R50.9 FEVER IN ADULT: Status: RESOLVED | Noted: 2021-04-26 | Resolved: 2022-01-13

## 2022-01-13 PROBLEM — K21.9 GERD (GASTROESOPHAGEAL REFLUX DISEASE): Status: ACTIVE | Noted: 2022-01-13

## 2022-01-13 PROBLEM — L03.116 CELLULITIS OF LEFT LOWER EXTREMITY: Status: RESOLVED | Noted: 2021-04-14 | Resolved: 2022-01-13

## 2022-01-13 PROBLEM — R55 NEAR SYNCOPE: Status: RESOLVED | Noted: 2017-06-07 | Resolved: 2022-01-13

## 2022-01-13 PROBLEM — G45.1 TIA INVOLVING LEFT INTERNAL CAROTID ARTERY: Status: RESOLVED | Noted: 2017-06-07 | Resolved: 2022-01-13

## 2022-01-13 PROCEDURE — 99214 OFFICE O/P EST MOD 30 MIN: CPT | Performed by: STUDENT IN AN ORGANIZED HEALTH CARE EDUCATION/TRAINING PROGRAM

## 2022-01-13 RX ORDER — METOPROLOL SUCCINATE 25 MG/1
1 TABLET, EXTENDED RELEASE ORAL DAILY
COMMUNITY
Start: 2022-01-10

## 2022-01-13 RX ORDER — LOSARTAN POTASSIUM 50 MG/1
50 TABLET ORAL DAILY
Qty: 30 TABLET | Refills: 0 | Status: SHIPPED
Start: 2022-01-13 | End: 2022-02-10

## 2022-01-13 RX ORDER — TIZANIDINE 4 MG/1
1 TABLET ORAL DAILY
COMMUNITY
Start: 2021-11-30

## 2022-01-13 ASSESSMENT — PATIENT HEALTH QUESTIONNAIRE - PHQ9
7. TROUBLE CONCENTRATING ON THINGS, SUCH AS READING THE NEWSPAPER OR WATCHING TELEVISION: 0
SUM OF ALL RESPONSES TO PHQ QUESTIONS 1-9: 5
2. FEELING DOWN, DEPRESSED OR HOPELESS: 0
SUM OF ALL RESPONSES TO PHQ9 QUESTIONS 1 & 2: 2
SUM OF ALL RESPONSES TO PHQ QUESTIONS 1-9: 5
4. FEELING TIRED OR HAVING LITTLE ENERGY: 3
8. MOVING OR SPEAKING SO SLOWLY THAT OTHER PEOPLE COULD HAVE NOTICED. OR THE OPPOSITE, BEING SO FIGETY OR RESTLESS THAT YOU HAVE BEEN MOVING AROUND A LOT MORE THAN USUAL: 0
10. IF YOU CHECKED OFF ANY PROBLEMS, HOW DIFFICULT HAVE THESE PROBLEMS MADE IT FOR YOU TO DO YOUR WORK, TAKE CARE OF THINGS AT HOME, OR GET ALONG WITH OTHER PEOPLE: 0
5. POOR APPETITE OR OVEREATING: 0
9. THOUGHTS THAT YOU WOULD BE BETTER OFF DEAD, OR OF HURTING YOURSELF: 0
1. LITTLE INTEREST OR PLEASURE IN DOING THINGS: 2
SUM OF ALL RESPONSES TO PHQ QUESTIONS 1-9: 5
SUM OF ALL RESPONSES TO PHQ QUESTIONS 1-9: 5
6. FEELING BAD ABOUT YOURSELF - OR THAT YOU ARE A FAILURE OR HAVE LET YOURSELF OR YOUR FAMILY DOWN: 0
3. TROUBLE FALLING OR STAYING ASLEEP: 0
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
## 2022-01-31 ENCOUNTER — TELEPHONE (OUTPATIENT)
Dept: FAMILY MEDICINE CLINIC | Age: 47
End: 2022-01-31

## 2022-01-31 DIAGNOSIS — M48.061 SPINAL STENOSIS OF LUMBAR REGION, UNSPECIFIED WHETHER NEUROGENIC CLAUDICATION PRESENT: Primary | ICD-10-CM

## 2022-01-31 NOTE — TELEPHONE ENCOUNTER
Pt is having Bariatric surgery on Friday and she is having a sciatica flare up she would like to know if you can call her in a prescription to  [facility] ,     She also is asking if she needs a follow up appt with you after her surgery

## 2022-02-01 RX ORDER — TRAMADOL HYDROCHLORIDE 50 MG/1
50 TABLET ORAL DAILY PRN
Qty: 3 TABLET | Refills: 0 | Status: SHIPPED | OUTPATIENT
Start: 2022-02-01 | End: 2022-02-04

## 2022-02-01 NOTE — TELEPHONE ENCOUNTER
Provider message given , she voiced understanding ,pt is going to  her prescription and follow up appt scheduled for 3/8/22

## 2022-02-01 NOTE — TELEPHONE ENCOUNTER
Sciatica is best managed through use of a heating pad in the morning to resolve stiffness followed by 20 min light stretching and a gradual increase in walking distance up to 2 miles. I have sent a few days of tramadol to the pharmacy also. Please schedule fu appointment 1 month after surgery.

## 2022-02-09 DIAGNOSIS — I10 ESSENTIAL HYPERTENSION: ICD-10-CM

## 2022-02-10 RX ORDER — LOSARTAN POTASSIUM 50 MG/1
50 TABLET ORAL DAILY
Qty: 90 TABLET | Refills: 1 | Status: SHIPPED
Start: 2022-02-10 | End: 2022-05-12

## 2022-02-28 ENCOUNTER — PATIENT MESSAGE (OUTPATIENT)
Dept: FAMILY MEDICINE CLINIC | Age: 47
End: 2022-02-28

## 2022-02-28 NOTE — TELEPHONE ENCOUNTER
From: Rosemarie Lunsford  To: Dr. Aruna Lizama: 2/28/2022 11:22 AM EST  Subject: montelukast    Good Morning, Was wondering if you could call me in a prescription of [id number] mg as i am on the last refill and due for more meds here soon?    Thank you so much!!!

## 2022-03-01 RX ORDER — MONTELUKAST SODIUM 10 MG/1
10 TABLET ORAL DAILY
Qty: 90 TABLET | Refills: 1 | Status: SHIPPED
Start: 2022-03-01 | End: 2022-07-01

## 2022-03-08 ENCOUNTER — OFFICE VISIT (OUTPATIENT)
Dept: FAMILY MEDICINE CLINIC | Age: 47
End: 2022-03-08
Payer: OTHER GOVERNMENT

## 2022-03-08 VITALS
HEIGHT: 61 IN | HEART RATE: 74 BPM | DIASTOLIC BLOOD PRESSURE: 77 MMHG | OXYGEN SATURATION: 96 % | WEIGHT: 293 LBS | TEMPERATURE: 97.7 F | BODY MASS INDEX: 55.32 KG/M2 | SYSTOLIC BLOOD PRESSURE: 143 MMHG | RESPIRATION RATE: 20 BRPM

## 2022-03-08 DIAGNOSIS — E03.9 ACQUIRED HYPOTHYROIDISM: ICD-10-CM

## 2022-03-08 DIAGNOSIS — I10 ESSENTIAL HYPERTENSION: Primary | ICD-10-CM

## 2022-03-08 DIAGNOSIS — R26.89 BALANCE DISORDER: ICD-10-CM

## 2022-03-08 DIAGNOSIS — M54.31 RIGHT SIDED SCIATICA: ICD-10-CM

## 2022-03-08 DIAGNOSIS — Z98.84 S/P GASTRIC BYPASS: ICD-10-CM

## 2022-03-08 PROCEDURE — 99214 OFFICE O/P EST MOD 30 MIN: CPT | Performed by: STUDENT IN AN ORGANIZED HEALTH CARE EDUCATION/TRAINING PROGRAM

## 2022-03-08 RX ORDER — ONDANSETRON 4 MG/1
1 TABLET, FILM COATED ORAL EVERY 8 HOURS PRN
COMMUNITY
Start: 2022-02-06

## 2022-03-08 RX ORDER — PANTOPRAZOLE SODIUM 40 MG/1
1 TABLET, DELAYED RELEASE ORAL DAILY
COMMUNITY
Start: 2022-01-24

## 2022-03-08 NOTE — PATIENT INSTRUCTIONS
Patient Education        Learning About Getting More Protein  How does your body use protein? Protein is an essential part of our diets. It is made up of chemicals called amino acids. Your body needs protein to help build and repair muscle, skin, and other body tissues. Protein also helps fight infection, balance body fluids, and carry oxygen through the body. How much protein do you need? How much protein you need each day depends on your age, sex, and how active you are. It's recommended that most adults eat 5 to 7 ounces of protein foods a day. Sometimes you may need to eat more protein. Your doctor may advise you on the right amount of protein you need. What foods contain protein? Protein is found in a variety of foods. High-protein foods include lean meat, poultry, and fish. A serving of these foods is 2 to 3 ounces. (3 ounces is about the size and thickness of a deck of cards.)  Protein isn't just found in meat. If you are looking for other types of protein, the following foods are equal to about 1 ounce of meat:  · ¼ cup of cooked beans, peas, or lentils  · ¼ cup of tofu (about 2 ounces)  · 2 Tbsp of hummus  · ½ ounce of nuts or seeds (for example, 12 almonds or 7 walnut halves)  · 1 egg  · 1 Tbsp of peanut butter or other nut or seed butter  Other sources of protein include cheese, milk, and other milk products. You can also buy protein bars, drinks, and powders. Check the nutrition label for the amount of protein in each serving. What are some tips for getting more protein? You can get more protein in your food by adding high-protein ingredients. For example, you can:  · Add powdered milk to other foods, such as pudding or soups. · Add powdered protein to fruit smoothies and cooked cereal.  · Add beans to soup and chili. · Add nuts, seeds, or wheat germ to yogurt. You can also:  · Spread peanut butter onto a banana. · Mix cottage cheese into noodle dishes or casseroles.   · Sprinkle hard-boiled eggs on a salad. · Grate cheese over vegetables and soups. Where can you learn more? Go to https://chpepiceweb.Scout. org and sign in to your [employer] account. Enter B068 in the [facility] box to learn more about \"Learning About Getting More Protein. \"     If you do not have an account, please click on the \"Sign Up Now\" link. Current as of: September 8, 2021               Content Version: 13.1  © 2006-2021 [employer]. Care instructions adapted under license by [facility] ([facility]). If you have questions about a medical condition or this instruction, always ask your healthcare professional. [place] [address] any warranty or liability for your use of this information. Patient Education        Low Back Pain: Exercises  Introduction  Here are some examples of exercises for you to try. The exercises may be suggested for a condition or for rehabilitation. Start each exercise slowly. Ease off the exercises if you start to have pain. You will be told when to start these exercises and which ones will work best for you. How to do the exercises  Press-up    1. Lie on your stomach, supporting your body with your forearms. 2. Press your elbows down into the floor to raise your upper back. As you do this, relax your stomach muscles and allow your back to arch without using your back muscles. As your press up, do not let your hips or pelvis come off the floor. 3. Hold for 15 to 30 seconds, then relax. 4. Repeat 2 to 4 times. Alternate arm and leg (bird dog) exercise    Do this exercise slowly. Try to keep your body straight at all times, and do not let one hip drop lower than the other. 1. Start on the floor, on your hands and knees. 2. Tighten your belly muscles. 3. Raise one leg off the floor, and hold it straight out behind you. Be careful not to let your hip drop down, because that will twist your trunk.   4. Hold for about 6 seconds, then lower your leg and switch to the other leg. 5. Repeat 8 to 12 times on each leg. 6. Over time, work up to holding for 10 to 30 seconds each time. 7. If you feel stable and secure with your leg raised, try raising the opposite arm straight out in front of you at the same time. Knee-to-chest exercise    1. Lie on your back with your knees bent and your feet flat on the floor. 2. Bring one knee to your chest, keeping the other foot flat on the floor (or keeping the other leg straight, whichever feels better on your lower back). 3. Keep your lower back pressed to the floor. Hold for at least 15 to 30 seconds. 4. Relax, and lower the knee to the starting position. 5. Repeat with the other leg. Repeat 2 to 4 times with each leg. 6. To get more stretch, put your other leg flat on the floor while pulling your knee to your chest.  Curl-ups    1. Lie on the floor on your back with your knees bent at a 90-degree angle. Your feet should be flat on the floor, about 12 inches from your buttocks. 2. Cross your arms over your chest. If this bothers your neck, try putting your hands behind your neck (not your head), with your elbows spread apart. 3. Slowly tighten your belly muscles and raise your shoulder blades off the floor. 4. Keep your head in line with your body, and do not press your chin to your chest.  5. Hold this position for 1 or 2 seconds, then slowly lower yourself back down to the floor. 6. Repeat 8 to 12 times. Pelvic tilt exercise    1. Lie on your back with your knees bent. 2. \"Brace\" your stomach. This means to tighten your muscles by pulling in and imagining your belly button moving toward your spine. You should feel like your back is pressing to the floor and your hips and pelvis are rocking back. 3. Hold for about 6 seconds while you breathe smoothly. 4. Repeat 8 to 12 times. Heel dig bridging    1. Lie on your back with both knees bent and your ankles bent so that only your heels are digging into the floor. Your knees should be bent about 90 degrees. 2. Then push your heels into the floor, squeeze your buttocks, and lift your hips off the floor until your shoulders, hips, and knees are all in a straight line. 3. Hold for about 6 seconds as you continue to breathe normally, and then slowly lower your hips back down to the floor and rest for up to 10 seconds. 4. Do 8 to 12 repetitions. Hamstring stretch in doorway    1. Lie on your back in a doorway, with one leg through the open door. 2. Slide your leg up the wall to straighten your knee. You should feel a gentle stretch down the back of your leg. 3. Hold the stretch for at least 15 to 30 seconds. Do not arch your back, point your toes, or bend either knee. Keep one heel touching the floor and the other heel touching the wall. 4. Repeat with your other leg. 5. Do 2 to 4 times for each leg. Hip flexor stretch    1. Kneel on the floor with one knee bent and one leg behind you. Place your forward knee over your foot. Keep your other knee touching the floor. 2. Slowly push your hips forward until you feel a stretch in the upper thigh of your rear leg. 3. Hold the stretch for at least 15 to 30 seconds. Repeat with your other leg. 4. Do 2 to 4 times on each side. Wall sit    1. Stand with your back 10 to 12 inches away from a wall. 2. Lean into the wall until your back is flat against it. 3. Slowly slide down until your knees are slightly bent, pressing your lower back into the wall. 4. Hold for about 6 seconds, then slide back up the wall. 5. Repeat 8 to 12 times. Follow-up care is a key part of your treatment and safety. Be sure to make and go to all appointments, and call your doctor if you are having problems. It's also a good idea to know your test results and keep a list of the medicines you take. Where can you learn more? Go to https://veena.[employer]ares GmbH. org and sign in to your [employer] account.  Enter K709 in the [facility] box to learn more about \"Low Back Pain: Exercises. \"     If you do not have an account, please click on the \"Sign Up Now\" link. Current as of: July 1, 2021               Content Version: 13.1  © 8585-3567 [employer]. Care instructions adapted under license by [facility] ([facility]). If you have questions about a medical condition or this instruction, always ask your healthcare professional. [address] any warranty or liability for your use of this information.

## 2022-03-08 NOTE — PROGRESS NOTES
kg/m²   General Appearance: Alert, oriented, no acute distress  HEENT: No scleral icterus. No visible discharge from eyes  Neck: Not rigid. No visible masses  Chest wall/Lung: Clear to auscultation bilaterally,  respirations unlabored. No ronchi/wheezing/rales  Heart: RRR, no murmur  Abdomen: Soft, nontender  Extremities:  No edema  Skin: No rashes. No jaundice. Slow healing but not infected port sites on abdomen  Neuro: Alert and oriented. Gait favors right side with possible short leg on that side. psych: Appropriate mood and appropriate affect    Assessment and Plan   1. Essential hypertension  Monitor in 1 month this patient continues to lose weight after gastric bypass. Continue same meds for now. Check at home. 2. Acquired hypothyroidism  Needs recheck a TSH after recent surgery.  -     TSH; Future  3. Right sided sciatica  Consider right-sided lumbar imaging, physical therapy at the next appointment. 4. Balance disorder  Monitor with changes in weight loss and increase in strengthening and stretching exercises. 5. S/P gastric bypass  Currently on good trajectory with weight loss. Return in about 4 weeks (around 4/5/2022) for HTN. Emily De León DO     This document may have been prepared at least partially through the use of voice recognition software. Although effort is taken to assure the accuracy of this document, it is possible that grammatical, syntax,  or spelling errors may occur.

## 2022-03-29 RX ORDER — AMLODIPINE BESYLATE 10 MG/1
10 TABLET ORAL DAILY
Qty: 90 TABLET | Refills: 1 | Status: SHIPPED
Start: 2022-03-29 | End: 2022-04-18 | Stop reason: SDUPTHER

## 2022-03-29 RX ORDER — LEVOTHYROXINE SODIUM 0.07 MG/1
75 TABLET ORAL DAILY
Qty: 90 TABLET | Refills: 1 | Status: SHIPPED
Start: 2022-03-29 | End: 2022-05-26 | Stop reason: SDUPTHER

## 2022-04-14 ENCOUNTER — OFFICE VISIT (OUTPATIENT)
Dept: FAMILY MEDICINE CLINIC | Age: 47
End: 2022-04-14
Payer: OTHER GOVERNMENT

## 2022-04-14 VITALS
SYSTOLIC BLOOD PRESSURE: 128 MMHG | BODY MASS INDEX: 52.11 KG/M2 | RESPIRATION RATE: 20 BRPM | HEART RATE: 63 BPM | WEIGHT: 276 LBS | TEMPERATURE: 97.3 F | OXYGEN SATURATION: 99 % | HEIGHT: 61 IN | DIASTOLIC BLOOD PRESSURE: 81 MMHG

## 2022-04-14 DIAGNOSIS — G89.29 CHRONIC PAIN OF RIGHT HIP: Primary | ICD-10-CM

## 2022-04-14 DIAGNOSIS — G89.29 CHRONIC RIGHT-SIDED LOW BACK PAIN WITH RIGHT-SIDED SCIATICA: ICD-10-CM

## 2022-04-14 DIAGNOSIS — M25.551 CHRONIC PAIN OF RIGHT HIP: Primary | ICD-10-CM

## 2022-04-14 DIAGNOSIS — M54.41 CHRONIC RIGHT-SIDED LOW BACK PAIN WITH RIGHT-SIDED SCIATICA: ICD-10-CM

## 2022-04-14 DIAGNOSIS — I10 ESSENTIAL HYPERTENSION: ICD-10-CM

## 2022-04-14 PROCEDURE — 99213 OFFICE O/P EST LOW 20 MIN: CPT | Performed by: STUDENT IN AN ORGANIZED HEALTH CARE EDUCATION/TRAINING PROGRAM

## 2022-04-14 NOTE — PROGRESS NOTES
kg/m²   General Appearance: Alert, oriented, no acute distress  HEENT: No scleral icterus. No visible discharge from eyes  Neck: Not rigid. No visible masses  Chest wall/Lung: Clear to auscultation bilaterally,  respirations unlabored. No ronchi/wheezing/rales  Heart: RRR, no murmur  Abdomen: Soft, nontender  Right hip/back - pain is lateral to lumbar spine on right side and along to posterior hip/pelvis. No swelling. Skin: No rashes. No jaundice. Slow healing but not infected port sites on abdomen  Neuro: Alert and oriented. Gait favors right side with possible short leg on that side. psych: Appropriate mood and appropriate affect    Assessment and Plan   Chronic pain of right hip  Check x-rays and consider aquatic therapy PT.  -     XR LUMBAR SPINE (2-3 VIEWS); Future  -     XR HIP BILATERAL W AP PELVIS (2 VIEWS); Future  Chronic right-sided low back pain with right-sided sciatica  As above. No high risk features. -     XR LUMBAR SPINE (2-3 VIEWS); Future  -     XR HIP BILATERAL W AP PELVIS (2 VIEWS); Future  Essential hypertension  Improving with weight loss. Continue same medication        Return in about 4 weeks (around 5/12/2022). Tyler Kearney,      This document may have been prepared at least partially through the use of voice recognition software. Although effort is taken to assure the accuracy of this document, it is possible that grammatical, syntax,  or spelling errors may occur.

## 2022-04-18 RX ORDER — AMLODIPINE BESYLATE 10 MG/1
10 TABLET ORAL DAILY
Qty: 90 TABLET | Refills: 1 | Status: SHIPPED
Start: 2022-04-18 | End: 2022-05-12

## 2022-04-21 ENCOUNTER — HOSPITAL ENCOUNTER (OUTPATIENT)
Age: 47
Discharge: HOME | End: 2022-04-21
Payer: OTHER GOVERNMENT

## 2022-04-21 DIAGNOSIS — E03.9 ACQUIRED HYPOTHYROIDISM: ICD-10-CM

## 2022-04-21 LAB — TSH SERPL DL<=0.05 MIU/L-ACNC: 1.09 UIU/ML (ref 0.27–4.2)

## 2022-04-21 PROCEDURE — 36415 COLL VENOUS BLD VENIPUNCTURE: CPT

## 2022-04-21 PROCEDURE — 84443 ASSAY THYROID STIM HORMONE: CPT

## 2022-05-12 DIAGNOSIS — I10 ESSENTIAL HYPERTENSION: Primary | ICD-10-CM

## 2022-05-12 RX ORDER — LOSARTAN POTASSIUM 100 MG/1
100 TABLET ORAL DAILY
Qty: 90 TABLET | Refills: 1 | Status: SHIPPED | OUTPATIENT
Start: 2022-05-12

## 2022-05-13 DIAGNOSIS — I10 ESSENTIAL HYPERTENSION: ICD-10-CM

## 2022-05-13 RX ORDER — LOSARTAN POTASSIUM 50 MG/1
50 TABLET ORAL DAILY
Qty: 90 TABLET | Refills: 1 | OUTPATIENT
Start: 2022-05-13 | End: 2022-11-09

## 2022-05-26 RX ORDER — LEVOTHYROXINE SODIUM 0.07 MG/1
75 TABLET ORAL DAILY
Qty: 90 TABLET | Refills: 1 | Status: SHIPPED | OUTPATIENT
Start: 2022-05-26

## 2022-06-23 ENCOUNTER — OFFICE VISIT (OUTPATIENT)
Dept: FAMILY MEDICINE CLINIC | Age: 47
End: 2022-06-23
Payer: OTHER GOVERNMENT

## 2022-06-23 VITALS
DIASTOLIC BLOOD PRESSURE: 83 MMHG | OXYGEN SATURATION: 96 % | WEIGHT: 243.2 LBS | SYSTOLIC BLOOD PRESSURE: 138 MMHG | RESPIRATION RATE: 18 BRPM | HEIGHT: 61 IN | TEMPERATURE: 97.3 F | BODY MASS INDEX: 45.91 KG/M2 | HEART RATE: 62 BPM

## 2022-06-23 DIAGNOSIS — G47.33 OSA (OBSTRUCTIVE SLEEP APNEA): Primary | ICD-10-CM

## 2022-06-23 DIAGNOSIS — Z12.11 SCREEN FOR COLON CANCER: ICD-10-CM

## 2022-06-23 DIAGNOSIS — E78.2 MIXED HYPERLIPIDEMIA: ICD-10-CM

## 2022-06-23 DIAGNOSIS — E03.9 ACQUIRED HYPOTHYROIDISM: ICD-10-CM

## 2022-06-23 DIAGNOSIS — I10 ESSENTIAL HYPERTENSION: ICD-10-CM

## 2022-06-23 PROCEDURE — 99214 OFFICE O/P EST MOD 30 MIN: CPT | Performed by: STUDENT IN AN ORGANIZED HEALTH CARE EDUCATION/TRAINING PROGRAM

## 2022-06-23 RX ORDER — AMLODIPINE BESYLATE 10 MG/1
1 TABLET ORAL DAILY
COMMUNITY
Start: 2022-06-09 | End: 2022-06-23

## 2022-06-23 SDOH — ECONOMIC STABILITY: INCOME INSECURITY: IN THE LAST 12 MONTHS, WAS THERE A TIME WHEN YOU WERE NOT ABLE TO PAY THE MORTGAGE OR RENT ON TIME?: NO

## 2022-06-23 SDOH — HEALTH STABILITY: PHYSICAL HEALTH: ON AVERAGE, HOW MANY DAYS PER WEEK DO YOU ENGAGE IN MODERATE TO STRENUOUS EXERCISE (LIKE A BRISK WALK)?: 7 DAYS

## 2022-06-23 SDOH — ECONOMIC STABILITY: HOUSING INSECURITY
IN THE LAST 12 MONTHS, WAS THERE A TIME WHEN YOU DID NOT HAVE A STEADY PLACE TO SLEEP OR SLEPT IN A SHELTER (INCLUDING NOW)?: NO

## 2022-06-23 SDOH — ECONOMIC STABILITY: HOUSING INSECURITY: IN THE LAST 12 MONTHS, HOW MANY PLACES HAVE YOU LIVED?: 1

## 2022-06-23 SDOH — ECONOMIC STABILITY: TRANSPORTATION INSECURITY
IN THE PAST 12 MONTHS, HAS THE LACK OF TRANSPORTATION KEPT YOU FROM MEDICAL APPOINTMENTS OR FROM GETTING MEDICATIONS?: NO

## 2022-06-23 SDOH — ECONOMIC STABILITY: TRANSPORTATION INSECURITY
IN THE PAST 12 MONTHS, HAS LACK OF TRANSPORTATION KEPT YOU FROM MEETINGS, WORK, OR FROM GETTING THINGS NEEDED FOR DAILY LIVING?: NO

## 2022-06-23 SDOH — HEALTH STABILITY: PHYSICAL HEALTH: ON AVERAGE, HOW MANY MINUTES DO YOU ENGAGE IN EXERCISE AT THIS LEVEL?: 30 MIN

## 2022-06-23 ASSESSMENT — SOCIAL DETERMINANTS OF HEALTH (SDOH)
WITHIN THE LAST YEAR, HAVE YOU BEEN KICKED, HIT, SLAPPED, OR OTHERWISE PHYSICALLY HURT BY YOUR PARTNER OR EX-PARTNER?: NO
IN A TYPICAL WEEK, HOW MANY TIMES DO YOU TALK ON THE PHONE WITH FAMILY, FRIENDS, OR NEIGHBORS?: MORE THAN THREE TIMES A WEEK
WITHIN THE LAST YEAR, HAVE YOU BEEN AFRAID OF YOUR PARTNER OR EX-PARTNER?: NO
HOW OFTEN DO YOU ATTENT MEETINGS OF THE CLUB OR ORGANIZATION YOU BELONG TO?: NEVER
DO YOU BELONG TO ANY CLUBS OR ORGANIZATIONS SUCH AS CHURCH GROUPS UNIONS, FRATERNAL OR ATHLETIC GROUPS, OR SCHOOL GROUPS?: NO
WITHIN THE LAST YEAR, HAVE TO BEEN RAPED OR FORCED TO HAVE ANY KIND OF SEXUAL ACTIVITY BY YOUR PARTNER OR EX-PARTNER?: NO
HOW OFTEN DO YOU GET TOGETHER WITH FRIENDS OR RELATIVES?: THREE TIMES A WEEK
HOW OFTEN DO YOU ATTEND CHURCH OR RELIGIOUS SERVICES?: NEVER
WITHIN THE LAST YEAR, HAVE YOU BEEN HUMILIATED OR EMOTIONALLY ABUSED IN OTHER WAYS BY YOUR PARTNER OR EX-PARTNER?: NO

## 2022-06-23 ASSESSMENT — LIFESTYLE VARIABLES: HOW OFTEN DO YOU HAVE A DRINK CONTAINING ALCOHOL: NEVER

## 2022-06-23 NOTE — PROGRESS NOTES
Patient:  Sterling [place] 50 y.o. female     22      Chiefcomplaint:   Chief Complaint   Patient presents with    Headache    Sleep Study     patient motor life on cpap has      Problems and Overview     Lab review ldl around 100 and improved from previous, gfr over 100,   Having headaches in morning. Wondering if pressures need changed. She has lost weight after bariatric surgery. Wondering about referral for sleep study. Recent vitamins ok, vit d is in 76s. Wondering about reducing dose. B1 and B12 ok. No anemia on cbc    HTN Off of amlodipine now. Previously on 10mg    Hypothyroid  75mcg  Lab Results   Component Value Date    TSH 1.090 2022       Patient Active Problem List    Diagnosis Date Noted    S/P gastric bypass 2022    Essential hypertension 2022    GERD (gastroesophageal reflux disease) 2022     History esophageal ulcer      History of echocardiogram 2022     Borderline lvh - ef ok       Prediabetes 2021    Mixed hyperlipidemia 2021     [id number]  hypertrigly       Muscle spasms of both lower extremities 2021    Migraine headache 2020     Last Assessment & Plan:   Formatting of this note might be different from the original.  Assessment: occurs 2x/week. Does not take Topamax anymore      Seasonal affective disorder ([address] 11/15/2016    OCD (obsessive compulsive disorder) 2016    JOI (obstructive sleep apnea) 2016    Acquired hypothyroidism 2016    Spinal stenosis 2016    Fibromyalgia 2016    Overflow incontinence 2016     botox injection in past.   Thought is will need pacer.  Hasn't seen urology yet      Mild intermittent asthma without complication      pft normal      MVP (mitral valve prolapse) 2016      Prevention:  Health Maintenance Due   Topic Date Due    Pneumococcal 0-64 years Vaccine (1 - PCV) Never done    HIV screen  Never done    Hepatitis C screen  Never done    DTaP/Tdap/Td vaccine (1 - Tdap) Never done    Colorectal Cancer Screen  Never done    A1C test (Diabetic or Prediabetic)  07/12/2022      Meds prior:  Current Outpatient Medications   Medication Instructions    docusate sodium (COLACE) 100 mg, Oral, DAILY    levothyroxine (SYNTHROID) 75 mcg, Oral, DAILY    losartan (COZAAR) 100 mg, Oral, DAILY    metoprolol succinate (TOPROL XL) 25 MG extended release tablet 1 tablet, Oral, DAILY    Multiple Vitamin (MULTIVITAMIN, BARIATRIC FUSION COMPLETE, CHEW TAB) 4 tablets, Oral, 2 TIMES DAILY    nystatin (MYCOSTATIN) 875925 UNIT/GM cream Apply topically 2 times daily.  ondansetron (ZOFRAN) 4 MG tablet 1 tablet, Oral, EVERY 8 HOURS PRN    pantoprazole (PROTONIX) 40 MG tablet 1 tablet, Oral, DAILY    tiZANidine (ZANAFLEX) 4 MG tablet 1 tablet, Oral, DAILY    vitamin D 5,000 Units, Oral      Physical Exam   Vitals: /83 (Site: Right Upper Arm, Position: Sitting, Cuff Size: Large Adult)   Pulse 62   Temp 97.3 °F (36.3 °C) (Temporal)   Resp 18   Ht 5' 1\" (1.549 m)   Wt 243 lb 3.2 oz (110.3 kg)   LMP  (LMP Unknown)   SpO2 96%   BMI 45.95 kg/m²   General Appearance: Alert, oriented, no acute distress  HEENT: No scleral icterus. No visible discharge from eyes  Neck: Not rigid. No visible masses  Chest wall/Lung: Clear to auscultation bilaterally,  respirations unlabored. No ronchi/wheezing/rales  Heart: RRR, no murmur  Abdomen: Soft, nontender  Extremities:  No edema  Skin: No rashes. No jaundice  Neuro: Alert and oriented        Psych: Appropriate mood and appropriate affect  Assessment and Plan   JOI (obstructive sleep apnea)  Recommend re-evaluation of sleep apnea now that s/p gastric bypass. -     [facility] Sleep Medicine  Mixed hyperlipidemia  Improving control. Continue same. Essential hypertension  bp at goal today. Continue same meds. Acquired hypothyroidism  Last tsh ok. Continue same.      Screen for colon cancer  -     Fecal DNA Colorectal cancer screening (Cologuard)      No follow-ups on file. Liam Meek,      This document may have been prepared at least partially through the use of voice recognition software. Although effort is taken to assure the accuracy of this document, it is possible that grammatical, syntax,  or spelling errors may occur.

## 2022-06-28 ENCOUNTER — TELEPHONE (OUTPATIENT)
Dept: FAMILY MEDICINE CLINIC | Age: 47
End: 2022-06-28

## 2022-06-28 DIAGNOSIS — G47.33 OSA (OBSTRUCTIVE SLEEP APNEA): Primary | ICD-10-CM

## 2022-06-28 NOTE — TELEPHONE ENCOUNTER
Pt would like a referral  sent to [facility]. pt can be scheduled there in [address] [place] [address] scheduling sleep study into sept. Pt is having headached,lost 80 lbs,and isuues w the sleep study machie going to die.  Fax to [facility] [phone]

## 2022-07-01 ENCOUNTER — PATIENT MESSAGE (OUTPATIENT)
Dept: FAMILY MEDICINE CLINIC | Age: 47
End: 2022-07-01

## 2022-07-01 RX ORDER — MONTELUKAST SODIUM 10 MG/1
10 TABLET ORAL DAILY
Qty: 90 TABLET | Refills: 1 | Status: SHIPPED
Start: 2022-07-01 | End: 2022-08-23 | Stop reason: SDUPTHER

## 2022-07-01 NOTE — TELEPHONE ENCOUNTER
From: Kassidy Suarez  To: Dr. Horacio Melendrez  Sent: 7/1/2022 9:41 AM EDT  Subject: Sleep study    After calling [facility] and [facility] for sleep studies I found out the wait is extremely long at [facility]! Can you please fax [facility] my sleep study? They have openings in July!    Thank you so much

## 2022-07-05 ENCOUNTER — TELEPHONE (OUTPATIENT)
Dept: FAMILY MEDICINE CLINIC | Age: 47
End: 2022-07-05

## 2022-07-05 NOTE — TELEPHONE ENCOUNTER
Please send order for Sleep study not Consult to [facility].  List why Pt needs the sleep study on order and fax to [facility]

## 2022-07-07 NOTE — TELEPHONE ENCOUNTER
Joel Beverly misunderstood. She needs to go see sleep medicine for consideration of a new study, to order the study and to interpret the results. Previously referred to [facility] sleep medicine group. Recommend she call them to schedule an appointment.

## 2022-07-14 DIAGNOSIS — G47.33 OSA (OBSTRUCTIVE SLEEP APNEA): Primary | ICD-10-CM

## 2022-07-14 NOTE — TELEPHONE ENCOUNTER
Pt called in about this not being taken care of. Read message to patient and she said that does not make sense. Pt had April from [facility] on the phone as well who said it just needs to say split study. Can someone please contact the Pt about this ASAP.

## 2022-07-23 LAB — NONINV COLON CA DNA+OCC BLD SCRN STL QL: NORMAL

## 2022-08-23 RX ORDER — MONTELUKAST SODIUM 10 MG/1
10 TABLET ORAL DAILY
Qty: 90 TABLET | Refills: 1 | Status: SHIPPED | OUTPATIENT
Start: 2022-08-23

## 2022-08-26 ENCOUNTER — CLINICAL DOCUMENTATION (OUTPATIENT)
Dept: FAMILY MEDICINE CLINIC | Age: 47
End: 2022-08-26

## 2022-08-26 ENCOUNTER — TELEPHONE (OUTPATIENT)
Dept: FAMILY MEDICINE CLINIC | Age: 47
End: 2022-08-26

## 2022-08-26 NOTE — LETTER
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 If you have any questions or concerns, please don't hesitate to call.     Sincerely,         Vicky Larson DO

## 2022-08-26 NOTE — TELEPHONE ENCOUNTER
Pt received a letter for jury duty for Aug 29. 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 It is documented in her husbands file that he is to have care give at all times in his file. She asked the [employer] [facility] to write the letter to excuse her from jury duty, but they just informed her that she needs to get the letter from her PCP. She is asking if doctor can write her a letter stating that she can not fulfill her jury duty because of the commitment to being a care giver.   Please call her when this letter is complete [phone]

## 2022-08-30 ENCOUNTER — TELEPHONE (OUTPATIENT)
Dept: FAMILY MEDICINE CLINIC | Age: 47
End: 2022-08-30

## 2022-08-30 NOTE — LETTER
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 If you have any questions or concerns, please don't hesitate to call.     Sincerely,         Kiara Jaeger DO

## 2022-09-13 ENCOUNTER — OFFICE VISIT (OUTPATIENT)
Dept: PRIMARY CARE CLINIC | Age: 47
End: 2022-09-13
Payer: OTHER GOVERNMENT

## 2022-09-13 VITALS
RESPIRATION RATE: 18 BRPM | HEART RATE: 58 BPM | TEMPERATURE: 97.2 F | DIASTOLIC BLOOD PRESSURE: 76 MMHG | BODY MASS INDEX: 41.54 KG/M2 | HEIGHT: 61 IN | WEIGHT: 220 LBS | SYSTOLIC BLOOD PRESSURE: 124 MMHG | OXYGEN SATURATION: 98 %

## 2022-09-13 DIAGNOSIS — R10.9 RIGHT FLANK PAIN: Primary | ICD-10-CM

## 2022-09-13 DIAGNOSIS — R68.83 CHILLS: ICD-10-CM

## 2022-09-13 DIAGNOSIS — R10.9 RIGHT FLANK PAIN: ICD-10-CM

## 2022-09-13 LAB
BILIRUBIN, POC: NORMAL
BLOOD URINE, POC: NORMAL
CLARITY, POC: CLEAR
COLOR, POC: YELLOW
GLUCOSE URINE, POC: NORMAL
KETONES, POC: NORMAL
LEUKOCYTE EST, POC: NORMAL
NITRITE, POC: NORMAL
PH, POC: 6.5
PROTEIN, POC: NORMAL
SPECIFIC GRAVITY, POC: >=1.03
UROBILINOGEN, POC: NORMAL

## 2022-09-13 PROCEDURE — 99213 OFFICE O/P EST LOW 20 MIN: CPT | Performed by: NURSE PRACTITIONER

## 2022-09-13 PROCEDURE — 81002 URINALYSIS NONAUTO W/O SCOPE: CPT | Performed by: NURSE PRACTITIONER

## 2022-09-13 RX ORDER — NITROFURANTOIN 25; 75 MG/1; MG/1
100 CAPSULE ORAL 2 TIMES DAILY
Qty: 20 CAPSULE | Refills: 0 | Status: SHIPPED | OUTPATIENT
Start: 2022-09-13 | End: 2022-09-23

## 2022-09-13 SDOH — ECONOMIC STABILITY: FOOD INSECURITY: WITHIN THE PAST 12 MONTHS, THE FOOD YOU BOUGHT JUST DIDN'T LAST AND YOU DIDN'T HAVE MONEY TO GET MORE.: NEVER TRUE

## 2022-09-13 SDOH — ECONOMIC STABILITY: FOOD INSECURITY: WITHIN THE PAST 12 MONTHS, YOU WORRIED THAT YOUR FOOD WOULD RUN OUT BEFORE YOU GOT MONEY TO BUY MORE.: NEVER TRUE

## 2022-09-13 ASSESSMENT — SOCIAL DETERMINANTS OF HEALTH (SDOH): HOW HARD IS IT FOR YOU TO PAY FOR THE VERY BASICS LIKE FOOD, HOUSING, MEDICAL CARE, AND HEATING?: NOT HARD AT ALL

## 2022-09-13 NOTE — PROGRESS NOTES
Chief Complaint:   Flank Pain (X couple days/) and Chills      History of Present Illness   Source of history provided by:  patient. Marvel Kocher is a 50 y.o. old female who has a past medical history of:   Past Medical History:   Diagnosis Date    Asthma     Bladder leak     for OR 9-18-20     Depression     Fibromyalgia     High cholesterol     Irritable bowel     Mitral valve prolapse     f/u w/ PCP only, no issues, no treatment     PONV (postoperative nausea and vomiting)     Prediabetes     Spinal stenosis         Pt presents to the ready care for right flank pain and chills for the past few days. Pt is afebrile. .  Denies any vaginal discharge, vaginal bleeding, vomiting, hematuria, urinary frequency/urgency, diarrhea, or lethargy. No LMP recorded (lmp unknown). Patient has had a hysterectomy. ROS    Unless otherwise stated in this report or unable to obtain because of the patient's clinical or mental status as evidenced by the medical record, this patients's positive and negative responses for Review of Systems, constitutional, psych, eyes, ENT, cardiovascular, respiratory, gastrointestinal, neurological, genitourinary, musculoskeletal, integument systems and systems related to the presenting problem are either stated in the preceding or were not pertinent or were negative for the symptoms and/or complaints related to the medical problem. Past Surgical history:   Past Surgical History:   Procedure Laterality Date    COLONOSCOPY      CYSTOSCOPY N/A 9/18/2020    CYSTOSCOPY WITH BOTOX INJECTION OF BLADDER 100 UNITS, BLADDER DILATION performed by Dk Mendoza MD at [address] [place] [address], [facility]      ENDOSCOPY, COLON, DIAGNOSTIC      HYSTERECTOMY (CERVIX STATUS UNKNOWN)      partial    LAPAROSCOPY       Social History:  reports that she has never smoked. She has never used smokeless tobacco. She reports that she does not drink alcohol and does not use drugs.   Family History: family history includes Diabetes in her father; Heart Disease in her mother; High Blood Pressure in her mother. Allergies: Latex, Adhesive tape, Aspirin, Benadryl [diphenhydramine hcl], Codeine, Demerol, Famotidine, Pregabalin, and Phenergan [promethazine hcl]    Physical Exam         VS:  /76   Pulse 58   Temp 97.2 °F (36.2 °C) (Temporal)   Resp 18   Ht 5' 1\" (1.549 m)   Wt 220 lb (99.8 kg)   LMP  (LMP Unknown)   SpO2 98%   BMI 41.57 kg/m²    Oxygen Saturation Interpretation: Normal.    Constitutional:  Alert, development consistent with age. HEENT:  Atraumatic. Airway patent. PERRL. Neck:  Normal ROM. Supple. Lungs:  Clear to auscultation and breath sounds equal.  Heart:  Regular rate and rhythm, normal heart sounds, without pathological murmurs, ectopy, gallops, or rubs. Abdomen: no suprapubic tenderness  soft and non distended,  No organomegaly. Back: Right CVA tenderness. Skin:  Normal turgor. Warm, dry, without visible rash, unless noted elsewhere. Neurological:  Alert and oriented. Motor functions intact. Responds to verbal commands. Lab / Imaging Results   (All laboratory and radiology results have been personally reviewed by myself)  Labs:  Results for orders placed or performed in visit on 09/13/22   POCT Urinalysis no Micro   Result Value Ref Range    Color, UA yellow     Clarity, UA clear     Glucose, UA POC neg     Bilirubin, UA neg     Ketones, UA neg     Spec Grav, UA >=1.030     Blood, UA POC neg     pH, UA 6.5     Protein, UA POC neg     Urobilinogen, UA 2.0 E.U. /dL     Leukocytes, UA neg     Nitrite, UA neg        Imaging: All Radiology results interpreted by Radiologist unless otherwise noted. No orders to display       Medical Decision Making:    Patient is well appearing, non toxic and appropriate for outpatient management. Plan is for symptom management and PCP follow up. Assessment / Plan     Impression(s):  1. Right flank pain    2.  Chills Disposition:  Disposition: Advised UA was unremarkable. Will treat w/Macrobid based on symptoms, urine culture to lab, continue to increase water intake, do not hold urine.  Advised to go to ED w/any worsening or concerning medical issues

## 2022-09-14 LAB — URINE CULTURE, ROUTINE: NORMAL

## 2022-09-29 ENCOUNTER — OFFICE VISIT (OUTPATIENT)
Dept: PRIMARY CARE CLINIC | Age: 47
End: 2022-09-29
Payer: OTHER GOVERNMENT

## 2022-09-29 VITALS
HEART RATE: 65 BPM | BODY MASS INDEX: 41.16 KG/M2 | TEMPERATURE: 97.7 F | WEIGHT: 218 LBS | HEIGHT: 61 IN | OXYGEN SATURATION: 97 % | SYSTOLIC BLOOD PRESSURE: 148 MMHG | DIASTOLIC BLOOD PRESSURE: 82 MMHG | RESPIRATION RATE: 18 BRPM

## 2022-09-29 DIAGNOSIS — R05.8 PRODUCTIVE COUGH: ICD-10-CM

## 2022-09-29 DIAGNOSIS — J06.9 ACUTE URI: Primary | ICD-10-CM

## 2022-09-29 PROCEDURE — 87426 SARSCOV CORONAVIRUS AG IA: CPT | Performed by: NURSE PRACTITIONER

## 2022-09-29 PROCEDURE — 99213 OFFICE O/P EST LOW 20 MIN: CPT | Performed by: NURSE PRACTITIONER

## 2022-09-29 RX ORDER — FLUCONAZOLE 150 MG/1
150 TABLET ORAL ONCE
Qty: 1 TABLET | Refills: 0 | Status: SHIPPED | OUTPATIENT
Start: 2022-09-29 | End: 2022-09-29

## 2022-09-29 RX ORDER — DOXYCYCLINE HYCLATE 100 MG
100 TABLET ORAL 2 TIMES DAILY
Qty: 14 TABLET | Refills: 0 | Status: SHIPPED | OUTPATIENT
Start: 2022-09-29 | End: 2022-10-06

## 2022-09-29 NOTE — PROGRESS NOTES
Chief Complaint:   Cough and Fever      History of Present Illness   Source of history provided by:  patient. Jacob Perales is a 50 y.o. old female with a past medical history of:   Past Medical History:   Diagnosis Date    Asthma     Bladder leak     for OR 9-18-20     Depression     Fibromyalgia     High cholesterol     Irritable bowel     Mitral valve prolapse     f/u w/ PCP only, no issues, no treatment     PONV (postoperative nausea and vomiting)     Prediabetes     Spinal stenosis         Pt presents to the [facility] care with a cough/congestion/fevers for the past several days. States the cough is  productive with yellow sputum. Subjective fever noted. Denies any N/V/D, abdominal pain, CP, progressive SOB, dizziness, or lethargy. ROS    Unless otherwise stated in this report or unable to obtain because of the patient's clinical or mental status as evidenced by the medical record, this patients's positive and negative responses for Review of Systems, constitutional, psych, eyes, ENT, cardiovascular, respiratory, gastrointestinal, neurological, genitourinary, musculoskeletal, integument systems and systems related to the presenting problem are either stated in the preceding or were not pertinent or were negative for the symptoms and/or complaints related to the medical problem. Past Surgical History:  has a past surgical history that includes Hysterectomy; laparoscopy; Colonoscopy; Dilatation, esophagus; Endoscopy, colon, diagnostic; and Cystoscopy (N/A, 9/18/2020). Social History:  reports that she has never smoked. She has never used smokeless tobacco. She reports that she does not drink alcohol and does not use drugs. Family History: family history includes Diabetes in her father; Heart Disease in her mother; High Blood Pressure in her mother.    Allergies: Latex, Adhesive tape, Aspirin, Benadryl [diphenhydramine hcl], Codeine, Demerol, Famotidine, Pregabalin, and Phenergan [promethazine hcl]    Physical Exam         VS:  BP (!) 148/82 (Site: Right Upper Arm, Position: Sitting, Cuff Size: Large Adult)   Pulse 65   Temp 97.7 °F (36.5 °C) (Temporal)   Resp 18   Ht 5' 1\" (1.549 m)   Wt 218 lb (98.9 kg)   LMP  (LMP Unknown)   SpO2 97%   BMI 41.19 kg/m²    Oxygen Saturation Interpretation: Normal.    Constitutional:  Alert, development consistent with age. Ears:  External Ears: Normal bilateral pinna. TM's & External Canals: TM's normal bilaterally without perforation. Canals without erythema or drainage. Nose:   There is no obvious septal defect. Mild/moderate redness/edema  Mouth:  Moist bucca mucosa and normal tongue. Throat: Mild posterior pharyngeal erythema without exudates or lesions. Neck:  Supple. There is no obvious adenopathy or neck tenderness. Lungs:   Breath sounds: Normal chest expansion and breath sounds noted throughout. no wheezes, rales, or rhonchi noted. Heart:  Regular rate and rhythm, normal heart sounds, without pathological murmurs, ectopy, gallops, or rubs. Skin:  Normal turgor. Warm, dry, without visible rash. Neurological:  Oriented. Motor functions intact. Lab / Imaging Results   (All laboratory and radiology results have been personally reviewed by myself)  Labs:  No results found for this visit on 09/29/22. Imaging: All Radiology results interpreted by Radiologist unless otherwise noted. No orders to display         Assessment / Plan     Impression(s):  1. Acute URI    2. Productive cough      Disposition:  Disposition: Advised Covid test is negative, start Doxycycline as ordered.  Stay well hydrated, Diflucan as needed for possible yeast infection following antibiotic treatment, return to walk in care w/any worsening or concerning medical issues

## 2023-03-16 NOTE — TELEPHONE ENCOUNTER
Pt would like a RX for trulicity, [facility] hasn't processed her request in the last 2 weeks and she is due for a shot today. Wants to know if Dr. Riaz Blanco can write it.     [facility] [address], [address] [place]

## 2023-03-17 DIAGNOSIS — I10 ESSENTIAL HYPERTENSION: ICD-10-CM

## 2023-03-17 RX ORDER — LOSARTAN POTASSIUM 100 MG/1
TABLET ORAL
Qty: 90 TABLET | Refills: 3 | Status: SHIPPED | OUTPATIENT
Start: 2023-03-17

## 2023-03-17 RX ORDER — DULAGLUTIDE 0.75 MG/.5ML
INJECTION, SOLUTION SUBCUTANEOUS
COMMUNITY
Start: 2023-02-16 | End: 2023-03-17 | Stop reason: SDUPTHER

## 2023-03-17 RX ORDER — DULAGLUTIDE 0.75 MG/.5ML
INJECTION, SOLUTION SUBCUTANEOUS
Qty: 4 ADJUSTABLE DOSE PRE-FILLED PEN SYRINGE | Refills: 0 | Status: SHIPPED | OUTPATIENT
Start: 2023-03-17

## 2023-03-17 RX ORDER — LEVOTHYROXINE SODIUM 0.07 MG/1
TABLET ORAL
Qty: 90 TABLET | Refills: 3 | Status: SHIPPED | OUTPATIENT
Start: 2023-03-17

## 2023-03-21 ENCOUNTER — OFFICE VISIT (OUTPATIENT)
Dept: FAMILY MEDICINE CLINIC | Age: 48
End: 2023-03-21
Payer: OTHER GOVERNMENT

## 2023-03-21 VITALS
TEMPERATURE: 97.3 F | SYSTOLIC BLOOD PRESSURE: 154 MMHG | DIASTOLIC BLOOD PRESSURE: 83 MMHG | WEIGHT: 204.8 LBS | OXYGEN SATURATION: 98 % | BODY MASS INDEX: 38.67 KG/M2 | HEART RATE: 67 BPM | RESPIRATION RATE: 16 BRPM | HEIGHT: 61 IN

## 2023-03-21 DIAGNOSIS — E03.9 ACQUIRED HYPOTHYROIDISM: ICD-10-CM

## 2023-03-21 DIAGNOSIS — Z12.31 SCREENING MAMMOGRAM FOR BREAST CANCER: ICD-10-CM

## 2023-03-21 DIAGNOSIS — I10 ESSENTIAL HYPERTENSION: Primary | ICD-10-CM

## 2023-03-21 DIAGNOSIS — E78.2 MIXED HYPERLIPIDEMIA: ICD-10-CM

## 2023-03-21 DIAGNOSIS — Z76.0 MEDICATION REFILL: ICD-10-CM

## 2023-03-21 DIAGNOSIS — Z98.84 S/P GASTRIC BYPASS: ICD-10-CM

## 2023-03-21 PROBLEM — N83.201 CYST OF RIGHT OVARY: Status: ACTIVE | Noted: 2023-03-21

## 2023-03-21 PROBLEM — R73.03 PREDIABETES: Status: RESOLVED | Noted: 2021-11-12 | Resolved: 2023-03-21

## 2023-03-21 PROCEDURE — 3077F SYST BP >= 140 MM HG: CPT | Performed by: STUDENT IN AN ORGANIZED HEALTH CARE EDUCATION/TRAINING PROGRAM

## 2023-03-21 PROCEDURE — 99214 OFFICE O/P EST MOD 30 MIN: CPT | Performed by: STUDENT IN AN ORGANIZED HEALTH CARE EDUCATION/TRAINING PROGRAM

## 2023-03-21 PROCEDURE — 3079F DIAST BP 80-89 MM HG: CPT | Performed by: STUDENT IN AN ORGANIZED HEALTH CARE EDUCATION/TRAINING PROGRAM

## 2023-03-21 RX ORDER — AMLODIPINE BESYLATE 5 MG/1
5 TABLET ORAL DAILY
Qty: 90 TABLET | Refills: 1 | Status: SHIPPED | OUTPATIENT
Start: 2023-03-21

## 2023-03-21 RX ORDER — FERROUS SULFATE 325(65) MG
1 TABLET ORAL DAILY
COMMUNITY
Start: 2023-02-16

## 2023-03-21 RX ORDER — CHOLECALCIFEROL (VITAMIN D3) 125 MCG
500 CAPSULE ORAL DAILY
COMMUNITY

## 2023-03-21 RX ORDER — KETOCONAZOLE 20 MG/G
CREAM TOPICAL
COMMUNITY
Start: 2023-02-16

## 2023-03-21 RX ORDER — TOPIRAMATE 25 MG/1
50 TABLET ORAL NIGHTLY
Qty: 180 TABLET | Refills: 1 | Status: SHIPPED | OUTPATIENT
Start: 2023-03-21 | End: 2023-06-19

## 2023-03-21 RX ORDER — TOPIRAMATE 25 MG/1
2 TABLET ORAL NIGHTLY
COMMUNITY
Start: 2023-03-10 | End: 2023-03-21 | Stop reason: SDUPTHER

## 2023-03-21 RX ORDER — NYSTATIN 100000 U/G
CREAM TOPICAL
Qty: 1 EACH | Refills: 3 | Status: SHIPPED | OUTPATIENT
Start: 2023-03-21

## 2023-03-21 ASSESSMENT — PATIENT HEALTH QUESTIONNAIRE - PHQ9
8. MOVING OR SPEAKING SO SLOWLY THAT OTHER PEOPLE COULD HAVE NOTICED. OR THE OPPOSITE, BEING SO FIGETY OR RESTLESS THAT YOU HAVE BEEN MOVING AROUND A LOT MORE THAN USUAL: 0
9. THOUGHTS THAT YOU WOULD BE BETTER OFF DEAD, OR OF HURTING YOURSELF: 0
SUM OF ALL RESPONSES TO PHQ9 QUESTIONS 1 & 2: 0
1. LITTLE INTEREST OR PLEASURE IN DOING THINGS: 0
6. FEELING BAD ABOUT YOURSELF - OR THAT YOU ARE A FAILURE OR HAVE LET YOURSELF OR YOUR FAMILY DOWN: 0
5. POOR APPETITE OR OVEREATING: 0
SUM OF ALL RESPONSES TO PHQ QUESTIONS 1-9: 0
4. FEELING TIRED OR HAVING LITTLE ENERGY: 0
SUM OF ALL RESPONSES TO PHQ QUESTIONS 1-9: 0
SUM OF ALL RESPONSES TO PHQ QUESTIONS 1-9: 0
10. IF YOU CHECKED OFF ANY PROBLEMS, HOW DIFFICULT HAVE THESE PROBLEMS MADE IT FOR YOU TO DO YOUR WORK, TAKE CARE OF THINGS AT HOME, OR GET ALONG WITH OTHER PEOPLE: 0
2. FEELING DOWN, DEPRESSED OR HOPELESS: 0
SUM OF ALL RESPONSES TO PHQ QUESTIONS 1-9: 0
7. TROUBLE CONCENTRATING ON THINGS, SUCH AS READING THE NEWSPAPER OR WATCHING TELEVISION: 0
3. TROUBLE FALLING OR STAYING ASLEEP: 0

## 2023-03-21 NOTE — PROGRESS NOTES
normal tsh on last check. No change. Pt working on weight loss s/p gastric bypass. Uses topamax and had injection of trulicity previously. Would like to continue both. Colon cancer screening due  Mammo due. Ordered. Essential hypertension  -     amLODIPine (NORVASC) 5 MG tablet; Take 1 tablet by mouth daily, Disp-90 tablet, R-1Normal  Mixed hyperlipidemia  Acquired hypothyroidism  S/P gastric bypass  Screening mammogram for breast cancer  -     GLEN DIGITAL SCREEN BILATERAL PER PROTOCOL; Future  Medication refill  -     nystatin (MYCOSTATIN) 582084 UNIT/GM cream; Apply topically 2 times daily. , Disp-1 each, R-3, Normal  -     amLODIPine (NORVASC) 5 MG tablet; Take 1 tablet by mouth daily, Disp-90 tablet, R-1Normal  -     topiramate (TOPAMAX) 25 MG tablet; Take 2 tablets by mouth at bedtime, Disp-180 tablet, R-1Normal        Return in about 4 weeks (around 4/18/2023) for HTN. Luanne Doctor, DO     This document may have been prepared at least partially through the use of voice recognition software. Although effort is taken to assure the accuracy of this document, it is possible that grammatical, syntax,  or spelling errors may occur.

## 2023-04-11 PROBLEM — E11.9 DIABETES MELLITUS WITHOUT COMPLICATION (HCC): Status: ACTIVE | Noted: 2022-11-28

## 2023-05-01 ENCOUNTER — TELEPHONE (OUTPATIENT)
Dept: CARDIOLOGY | Age: 48
End: 2023-05-01

## 2023-05-01 NOTE — TELEPHONE ENCOUNTER
Left message on voice mail to remind patient of pharmacological stress test appointment on May 2, 2023 at 0800. Instructions for test,including holding toprol morning of test, and COVID-19 preprocedure information left on voice mail. Asked patient to call with any questions or if unable to keep appointment.

## 2023-05-03 ENCOUNTER — HOSPITAL ENCOUNTER (OUTPATIENT)
Dept: CARDIOLOGY | Age: 48
Discharge: HOME | End: 2023-05-03
Payer: OTHER GOVERNMENT

## 2023-05-03 ENCOUNTER — PATIENT MESSAGE (OUTPATIENT)
Dept: FAMILY MEDICINE CLINIC | Age: 48
End: 2023-05-03

## 2023-05-03 VITALS
RESPIRATION RATE: 16 BRPM | HEART RATE: 60 BPM | DIASTOLIC BLOOD PRESSURE: 88 MMHG | HEIGHT: 60 IN | WEIGHT: 210 LBS | SYSTOLIC BLOOD PRESSURE: 130 MMHG | BODY MASS INDEX: 41.23 KG/M2

## 2023-05-03 DIAGNOSIS — I51.7 LVH (LEFT VENTRICULAR HYPERTROPHY): ICD-10-CM

## 2023-05-03 DIAGNOSIS — I44.7 LEFT BUNDLE BRANCH BLOCK: ICD-10-CM

## 2023-05-03 PROCEDURE — A9500 TC99M SESTAMIBI: HCPCS | Performed by: INTERNAL MEDICINE

## 2023-05-03 PROCEDURE — 93017 CV STRESS TEST TRACING ONLY: CPT

## 2023-05-03 PROCEDURE — 2580000003 HC RX 258: Performed by: INTERNAL MEDICINE

## 2023-05-03 PROCEDURE — 78452 HT MUSCLE IMAGE SPECT MULT: CPT

## 2023-05-03 PROCEDURE — 6360000002 HC RX W HCPCS: Performed by: STUDENT IN AN ORGANIZED HEALTH CARE EDUCATION/TRAINING PROGRAM

## 2023-05-03 PROCEDURE — 3430000000 HC RX DIAGNOSTIC RADIOPHARMACEUTICAL: Performed by: INTERNAL MEDICINE

## 2023-05-03 RX ORDER — TETRAKIS(2-METHOXYISOBUTYLISOCYANIDE)COPPER(I) TETRAFLUOROBORATE 1 MG/ML
10.2 INJECTION, POWDER, LYOPHILIZED, FOR SOLUTION INTRAVENOUS
Status: COMPLETED | OUTPATIENT
Start: 2023-05-03 | End: 2023-05-03

## 2023-05-03 RX ORDER — TETRAKIS(2-METHOXYISOBUTYLISOCYANIDE)COPPER(I) TETRAFLUOROBORATE 1 MG/ML
28.7 INJECTION, POWDER, LYOPHILIZED, FOR SOLUTION INTRAVENOUS
Status: COMPLETED | OUTPATIENT
Start: 2023-05-03 | End: 2023-05-03

## 2023-05-03 RX ORDER — SODIUM CHLORIDE 0.9 % (FLUSH) 0.9 %
10 SYRINGE (ML) INJECTION PRN
Status: DISCONTINUED | OUTPATIENT
Start: 2023-05-03 | End: 2023-05-04 | Stop reason: HOSPADM

## 2023-05-03 RX ADMIN — SODIUM CHLORIDE, PRESERVATIVE FREE 10 ML: 5 INJECTION INTRAVENOUS at 08:11

## 2023-05-03 RX ADMIN — REGADENOSON 0.4 MG: 0.08 INJECTION, SOLUTION INTRAVENOUS at 10:38

## 2023-05-03 RX ADMIN — Medication 10.2 MILLICURIE: at 08:11

## 2023-05-03 RX ADMIN — Medication 28.7 MILLICURIE: at 10:39

## 2023-05-03 RX ADMIN — SODIUM CHLORIDE, PRESERVATIVE FREE 10 ML: 5 INJECTION INTRAVENOUS at 10:39

## 2023-05-03 RX ADMIN — SODIUM CHLORIDE, PRESERVATIVE FREE 10 ML: 5 INJECTION INTRAVENOUS at 10:38

## 2023-05-03 NOTE — TELEPHONE ENCOUNTER
From: Ruth Ann Sharma  To: Dr. Hellen Walters  Sent: 5/3/2023 3:00 PM EDT  Subject: Abnormal stress test results     Hello my results are abnormal of stress test and I need to see a Cardiologist. Can you put in a referral for me please? My preference is Dr Chet Carpenter but he is booked out so far per  . so anyone first available at [facility] is who I am wanting to see. I trust everyone there. Thank u so much. Do u still want to see me?  I scheduled appt for May 10 th!

## 2023-05-03 NOTE — RESULT ENCOUNTER NOTE
Stress test was abnormal. I would recommend fu with cardiology to consider next steps.  If she does not have a cardiologist, we will place referral.

## 2023-05-03 NOTE — DISCHARGE INSTRUCTIONS
[address]      Instructions to Patients    The following are the instructions for patients who have had a procedure in our office today. Patient name: Jose Arambula    Radionuclide Activity: 40mCi of 99mTc-Sestamibi    Date Administered: 5/3/2023    Expires: 48 hours after scheduled appointment time      Patient may resume normal activity unless otherwise instructed. Patient may resume medications as normal.  If the need should arise, patient may call [phone] between the hours of 8:00am-5:00pm.  After hours there is at least one physician on-call at all times for those patients needing assistance. Patients may call [phone] and the answering service will direct the patient to one of our physicians for assistance. After the patient's test if they are going to be leaving from an airport in the near future they should take this letter with them to verify the test and radionuclide used for their test.      This letter verifies that the above named bearer received an injection of a radionuclide for medical purpose/usage only.         Electronically signed by Marcus Duverney on 5/3/2023 at 10:30 AM

## 2023-05-03 NOTE — PROCEDURES
[address],[facility] [address]  [phone]                Pharmacologic Stress Nuclear Gated SPECT Study    Name: [address] Account Number: [de-identified]    :  1975          Sex: female         Date of Study:  5/3/2023    Height: 5' (152.4 cm)         Weight - Scale: 210 lb (95.3 kg)     Ordering Provider: Delfino Mitchell DO          PCP: Delfino Mitchell DO      Cardiologist: none             Interpreting Physician: Bety Zhou MD  _________________________________________________________________________________    Indication:   Detecting the presence and location of coronary artery disease    Clinical History:   Patient has no known history of coronary artery disease. Resting ECG:    OH int .18 sec, QRS int . 10 sec, QT int . 40 sec; HR 58 bpm  Normal sinus rhythm and Nonspecific ST-T wave changes    Procedure:   Pharmacologic stress testing was performed with regadenoson 0.4 mg for 15 seconds. Additionally, low-level exercise was performed along with the infusion. The heart rate was 58 at baseline and julio to 106 beats during the infusion. This corresponds to 62% of maximum predicted heart rate. The blood pressure at baseline was 130/88 and blood pressure at the end of infusion was 140/80. Blood pressure response was normal during the stress procedure. The patient experienced a headache during the infusion that resolved in recovery. ECG during the infusion did not change. IMAGING: Myocardial perfusion imaging was performed at rest 30-35 minutes following the intravenous injection of 10.2 mCi of (Tc-Sestamibi) followed by 10 ml of Normal Saline. As per infusion protocol, the patient was injected intravenously with 28.7 mCi of (Tc-Sestamibi) followed by 10 ml of Normal Saline. Gated post-stress tomographic imaging was performed 20-25 minutes after stress.      FINDINGS: The overall quality

## 2023-05-04 ENCOUNTER — TELEPHONE (OUTPATIENT)
Dept: ADMINISTRATIVE | Age: 48
End: 2023-05-04

## 2023-05-04 DIAGNOSIS — R94.39 ABNORMAL CARDIOVASCULAR STRESS TEST: ICD-10-CM

## 2023-05-04 DIAGNOSIS — R94.39 ABNORMAL STRESS ECG: Primary | ICD-10-CM

## 2023-05-04 NOTE — TELEPHONE ENCOUNTER
NP scheduled from the [facility] beds at the [facility]. Patient Appointment Form:      PCP: Dr. Anayeli Dominguez  Referring: Dr. Anayeli Dominguez    Has the Patient:    Seen a Cardiologist? Yes [facility] 2017 IP Consult    Had a heart catheterization? no    Had heart surgery? no    Had a stress test or nuclear stress test? Yes 4/11/23 [address]    Had an echocardiogram? Yes 2017 Epic    Had a vascular ultrasound? Yes 2021 US DUP Epic    Had a 24/48 heart monitor or extended cardiac event monitor? no    Had recent blood work in the last 6 months?  Yes 4/11/23 Epic PCP      Had a pacemaker/ICD/ILR implant? no    Seen an Electrophysiologist? no        Will send records via: Recent Stress in Epic Prior IP 2017 179-[address] R[address] Martines in Medicine in Practice - PCP recs in Medicine in Practice       Date & time of appointment:  5/8/23 @ 12:15 with Dr. Alejandro Talbot

## 2023-05-07 PROBLEM — R94.39 ABNORMAL NUCLEAR STRESS TEST: Status: ACTIVE | Noted: 2023-05-07

## 2023-05-07 PROBLEM — Z92.89 HISTORY OF ECHOCARDIOGRAM: Status: RESOLVED | Noted: 2022-01-13 | Resolved: 2023-05-07

## 2023-05-07 PROBLEM — M62.838 MUSCLE SPASMS OF BOTH LOWER EXTREMITIES: Status: RESOLVED | Noted: 2021-07-09 | Resolved: 2023-05-07

## 2023-05-08 ENCOUNTER — OFFICE VISIT (OUTPATIENT)
Dept: CARDIOLOGY CLINIC | Age: 48
End: 2023-05-08
Payer: OTHER GOVERNMENT

## 2023-05-08 VITALS
HEART RATE: 52 BPM | WEIGHT: 198.2 LBS | SYSTOLIC BLOOD PRESSURE: 135 MMHG | OXYGEN SATURATION: 98 % | HEIGHT: 60 IN | BODY MASS INDEX: 38.91 KG/M2 | DIASTOLIC BLOOD PRESSURE: 81 MMHG | RESPIRATION RATE: 16 BRPM

## 2023-05-08 DIAGNOSIS — R94.39 ABNORMAL NUCLEAR STRESS TEST: Primary | ICD-10-CM

## 2023-05-08 PROCEDURE — 3075F SYST BP GE 130 - 139MM HG: CPT | Performed by: INTERNAL MEDICINE

## 2023-05-08 PROCEDURE — 93000 ELECTROCARDIOGRAM COMPLETE: CPT | Performed by: INTERNAL MEDICINE

## 2023-05-08 PROCEDURE — 99204 OFFICE O/P NEW MOD 45 MIN: CPT | Performed by: INTERNAL MEDICINE

## 2023-05-08 PROCEDURE — 3079F DIAST BP 80-89 MM HG: CPT | Performed by: INTERNAL MEDICINE

## 2023-05-08 NOTE — PROGRESS NOTES
Chief Complaint   Patient presents with    Abnormal Test Results       Patient Active Problem List    Diagnosis Date Noted    Abnormal nuclear stress test 05/07/2023     Overview Note:     MARLEE Colmenares 5/3/2023 (Tojeffery Felt):  moderate sized completely reversible defect in the mid anterolateral  wall  Overall left ventricular systolic function was normal       Cyst of right ovary 03/21/2023    Diabetes mellitus without complication ([facility] [address] 43/90/2703    S/P gastric bypass 03/08/2022    Essential hypertension 03/08/2022    GERD (gastroesophageal reflux disease) 01/13/2022     Overview Note:     History esophageal ulcer      Mixed hyperlipidemia 07/16/2021     Overview Note:     [id number]  hypertrigly       Migraine headache 12/28/2020     Overview Note:     Last Assessment & Plan:   Formatting of this note might be different from the original.  Assessment: occurs 2x/week. Does not take Topamax anymore      Seasonal affective disorder ([facility] [address] 11/15/2016    OCD (obsessive compulsive disorder) 05/12/2016    JOI (obstructive sleep apnea) 05/12/2016    Acquired hypothyroidism 05/12/2016    Spinal stenosis 05/12/2016    Fibromyalgia 05/12/2016    Overflow incontinence 05/12/2016     Overview Note:     botox injection in past.   Thought is will need pacer.  Hasn't seen urology yet      Mild intermittent asthma without complication 47/89/4010     Overview Note:     pft normal         Current Outpatient Medications   Medication Sig Dispense Refill    S-Adenosylmethionine (SAME) 400 MG TABS Take 2 tablets by mouth daily      dulaglutide (TRULICITY) 1.5 WI/7.4NE SC injection Inject 0.5 mLs into the skin once a week 12 Adjustable Dose Pre-filled Pen Syringe 1    acetaminophen (TYLENOL) 500 MG tablet Take 1 tablet by mouth 4 times daily as needed for Pain 20 tablet 1    ketoconazole (NIZORAL) 2 % cream APPLY TOPICALLY TO THE AFFECTED AREA EVERY DAY      vitamin B-12 (CYANOCOBALAMIN) 500 MCG tablet Take 1 tablet by mouth daily      nystatin

## 2023-05-10 ENCOUNTER — OFFICE VISIT (OUTPATIENT)
Dept: FAMILY MEDICINE CLINIC | Age: 48
End: 2023-05-10
Payer: OTHER GOVERNMENT

## 2023-05-10 VITALS
TEMPERATURE: 97.3 F | DIASTOLIC BLOOD PRESSURE: 88 MMHG | WEIGHT: 199.8 LBS | SYSTOLIC BLOOD PRESSURE: 127 MMHG | HEIGHT: 60 IN | RESPIRATION RATE: 16 BRPM | HEART RATE: 60 BPM | BODY MASS INDEX: 39.23 KG/M2 | OXYGEN SATURATION: 98 %

## 2023-05-10 DIAGNOSIS — R73.01 ELEVATED FASTING GLUCOSE: ICD-10-CM

## 2023-05-10 DIAGNOSIS — I10 ESSENTIAL HYPERTENSION: ICD-10-CM

## 2023-05-10 DIAGNOSIS — R94.39 ABNORMAL NUCLEAR STRESS TEST: ICD-10-CM

## 2023-05-10 DIAGNOSIS — I25.118 CORONARY ARTERY DISEASE OF NATIVE ARTERY OF NATIVE HEART WITH STABLE ANGINA PECTORIS (HCC): ICD-10-CM

## 2023-05-10 DIAGNOSIS — E11.9 DIABETES MELLITUS WITHOUT COMPLICATION (HCC): ICD-10-CM

## 2023-05-10 DIAGNOSIS — I25.118 CORONARY ARTERY DISEASE OF NATIVE ARTERY OF NATIVE HEART WITH STABLE ANGINA PECTORIS (HCC): Primary | ICD-10-CM

## 2023-05-10 DIAGNOSIS — E78.2 MIXED HYPERLIPIDEMIA: ICD-10-CM

## 2023-05-10 LAB — HBA1C MFR BLD: 4.9 %

## 2023-05-10 PROCEDURE — 99214 OFFICE O/P EST MOD 30 MIN: CPT | Performed by: STUDENT IN AN ORGANIZED HEALTH CARE EDUCATION/TRAINING PROGRAM

## 2023-05-10 PROCEDURE — 3079F DIAST BP 80-89 MM HG: CPT | Performed by: STUDENT IN AN ORGANIZED HEALTH CARE EDUCATION/TRAINING PROGRAM

## 2023-05-10 PROCEDURE — 3074F SYST BP LT 130 MM HG: CPT | Performed by: STUDENT IN AN ORGANIZED HEALTH CARE EDUCATION/TRAINING PROGRAM

## 2023-05-10 PROCEDURE — 83036 HEMOGLOBIN GLYCOSYLATED A1C: CPT | Performed by: STUDENT IN AN ORGANIZED HEALTH CARE EDUCATION/TRAINING PROGRAM

## 2023-05-10 PROCEDURE — 3044F HG A1C LEVEL LT 7.0%: CPT | Performed by: STUDENT IN AN ORGANIZED HEALTH CARE EDUCATION/TRAINING PROGRAM

## 2023-05-10 RX ORDER — ASPIRIN 81 MG/1
81 TABLET ORAL DAILY
Qty: 30 TABLET | Refills: 0 | Status: SHIPPED | OUTPATIENT
Start: 2023-05-10

## 2023-05-10 RX ORDER — ROSUVASTATIN CALCIUM 10 MG/1
10 TABLET, COATED ORAL DAILY
Qty: 90 TABLET | Refills: 1 | Status: SHIPPED
Start: 2023-05-10 | End: 2023-05-10 | Stop reason: SDUPTHER

## 2023-05-10 RX ORDER — ASPIRIN 81 MG/1
81 TABLET ORAL DAILY
Qty: 90 TABLET | Refills: 1 | Status: SHIPPED
Start: 2023-05-10 | End: 2023-05-10 | Stop reason: SDUPTHER

## 2023-05-10 RX ORDER — METOPROLOL SUCCINATE 50 MG/1
50 TABLET, EXTENDED RELEASE ORAL DAILY
Qty: 90 TABLET | Refills: 1 | Status: SHIPPED | OUTPATIENT
Start: 2023-05-10 | End: 2023-11-06

## 2023-05-10 RX ORDER — ROSUVASTATIN CALCIUM 10 MG/1
10 TABLET, COATED ORAL DAILY
Qty: 30 TABLET | Refills: 0 | Status: SHIPPED | OUTPATIENT
Start: 2023-05-10 | End: 2023-11-06

## 2023-05-10 NOTE — PROGRESS NOTES
Patient:  Scarlett Alvarado 46 y.o. female     05/10/23      Chiefcomplaint:   Chief Complaint   Patient presents with    Results     Stress test results     History of Present Illness     Pt with abnormal stress test. She saw cardiology. It was not a positive experience. She is currently on bb, arb. She denies cp, chest pressure, new onset sob or lightheadedness. Health Maintenance Due   Topic Date Due    Diabetic foot exam  Never done    HIV screen  Never done    Diabetic Alb to Cr ratio (uACR) test  Never done    Hepatitis C screen  Never done    Hepatitis B vaccine (1 of 3 - Risk 3-dose series) Never done    DTaP/Tdap/Td vaccine (1 - Tdap) Never done    Colorectal Cancer Screen  Never done    Lipids  06/07/2018    Diabetic retinal exam  11/22/2022    Breast cancer screen  Never done    Shingles vaccine (2 of 2) 05/09/2023      History:  Prior to Visit Medications    Medication Sig Taking? Authorizing Provider   metoprolol succinate (TOPROL XL) 50 MG extended release tablet Take 1 tablet by mouth daily Yes Fox Gabriel DO   aspirin EC 81 MG EC tablet Take 1 tablet by mouth daily Yes Fox Gabriel DO   Dulaglutide 3 MG/0.5ML SOPN Inject 3 mg into the skin once a week Yes Fox Gabriel DO   rosuvastatin (CRESTOR) 10 MG tablet Take 1 tablet by mouth daily Yes Fox Gabriel DO   S-Adenosylmethionine (SAME) 400 MG TABS Take 2 tablets by mouth daily Yes Historical Provider, MD   acetaminophen (TYLENOL) 500 MG tablet Take 1 tablet by mouth 4 times daily as needed for Pain Yes SHANIQUA An - CNP   ketoconazole (NIZORAL) 2 % cream APPLY TOPICALLY TO THE AFFECTED AREA EVERY DAY Yes Historical Provider, MD   vitamin B-12 (CYANOCOBALAMIN) 500 MCG tablet Take 1 tablet by mouth daily Yes Historical Provider, MD   nystatin (MYCOSTATIN) 207471 UNIT/GM cream Apply topically 2 times daily.  Yes Fox Gabriel DO   amLODIPine (NORVASC) 5 MG tablet Take 1 tablet by mouth daily Yes Fox Gabriel DO

## 2023-05-17 ENCOUNTER — TELEPHONE (OUTPATIENT)
Dept: FAMILY MEDICINE CLINIC | Age: 48
End: 2023-05-17

## 2023-05-17 NOTE — TELEPHONE ENCOUNTER
Discussed with patient. She called 911. They found normal glucose, normal vitals. She denies chest pain, sob, or really any other symptoms other than she doesn't quite feel herself. I would recommend she come in tomorrow for evaluation. She should hold her next dose of trulicity. If she develops chest pain, sob, fatigue she could go to emergency for evaluation.

## 2023-05-17 NOTE — TELEPHONE ENCOUNTER
Patient had blood work done yesterday at [facility] and her blood sugar was 39. She is on Trulicity for weight loss. [facility] advised her to contact her PCP about the possibility of going to the hospital because she isn't feeling well. She is scheduled for stress test at [facility] next week. Pt wants to know if she should go to hospital, and if so, [facility] or [facility], as [facility] Shell will be the one treating her heart.

## 2023-05-18 NOTE — TELEPHONE ENCOUNTER
Called patient to offer her appt for today. She went to [facility] yesterday and was admitted, may be discharged today. They did recommend she decrease trulicity to 1.5 instead of 3. Scheduled her a f/u appt for next week.

## 2023-06-06 DIAGNOSIS — I25.118 CORONARY ARTERY DISEASE OF NATIVE ARTERY OF NATIVE HEART WITH STABLE ANGINA PECTORIS (HCC): ICD-10-CM

## 2023-06-06 RX ORDER — ASPIRIN 81 MG/1
TABLET, COATED ORAL
Qty: 30 TABLET | Refills: 0 | Status: SHIPPED | OUTPATIENT
Start: 2023-06-06

## 2023-06-06 RX ORDER — ROSUVASTATIN CALCIUM 10 MG/1
10 TABLET, COATED ORAL DAILY
Qty: 30 TABLET | Refills: 0 | Status: SHIPPED | OUTPATIENT
Start: 2023-06-06 | End: 2023-12-03

## 2023-06-09 ENCOUNTER — OFFICE VISIT (OUTPATIENT)
Dept: FAMILY MEDICINE CLINIC | Age: 48
End: 2023-06-09
Payer: OTHER GOVERNMENT

## 2023-06-09 VITALS
DIASTOLIC BLOOD PRESSURE: 78 MMHG | BODY MASS INDEX: 39.07 KG/M2 | HEART RATE: 65 BPM | HEIGHT: 60 IN | SYSTOLIC BLOOD PRESSURE: 124 MMHG | TEMPERATURE: 97.1 F | WEIGHT: 199 LBS | OXYGEN SATURATION: 98 %

## 2023-06-09 DIAGNOSIS — Z98.84 S/P GASTRIC BYPASS: ICD-10-CM

## 2023-06-09 DIAGNOSIS — E03.9 ACQUIRED HYPOTHYROIDISM: ICD-10-CM

## 2023-06-09 DIAGNOSIS — I10 ESSENTIAL HYPERTENSION: ICD-10-CM

## 2023-06-09 DIAGNOSIS — R94.39 ABNORMAL NUCLEAR STRESS TEST: ICD-10-CM

## 2023-06-09 DIAGNOSIS — I25.118 CORONARY ARTERY DISEASE OF NATIVE ARTERY OF NATIVE HEART WITH STABLE ANGINA PECTORIS (HCC): ICD-10-CM

## 2023-06-09 DIAGNOSIS — M79.7 FIBROMYALGIA: ICD-10-CM

## 2023-06-09 DIAGNOSIS — E11.9 DIABETES MELLITUS WITHOUT COMPLICATION (HCC): ICD-10-CM

## 2023-06-09 DIAGNOSIS — R53.82 CHRONIC FATIGUE: Primary | ICD-10-CM

## 2023-06-09 PROCEDURE — 3074F SYST BP LT 130 MM HG: CPT | Performed by: STUDENT IN AN ORGANIZED HEALTH CARE EDUCATION/TRAINING PROGRAM

## 2023-06-09 PROCEDURE — 99214 OFFICE O/P EST MOD 30 MIN: CPT | Performed by: STUDENT IN AN ORGANIZED HEALTH CARE EDUCATION/TRAINING PROGRAM

## 2023-06-09 PROCEDURE — 3044F HG A1C LEVEL LT 7.0%: CPT | Performed by: STUDENT IN AN ORGANIZED HEALTH CARE EDUCATION/TRAINING PROGRAM

## 2023-06-09 PROCEDURE — 3078F DIAST BP <80 MM HG: CPT | Performed by: STUDENT IN AN ORGANIZED HEALTH CARE EDUCATION/TRAINING PROGRAM

## 2023-06-09 RX ORDER — METOPROLOL SUCCINATE 25 MG/1
25 TABLET, EXTENDED RELEASE ORAL DAILY
Qty: 90 TABLET | Refills: 1
Start: 2023-06-09

## 2023-06-09 NOTE — PROGRESS NOTES
fatigue with exertion. Possible fibro, possibly just reduced exercise capacity. She was previously a \"couch potato\" and is trying to increase activity gradually. Continue to increase activity. S/p bypass- she stopped topamax and trulicity so will need to monitor weight and increase activity. Also discussed referral to dietician. She would like to hold off for now. Hx hyperglycemia/dm2 - a1c is normal. Off trulicity. Will monitor. Thyroid levels appropriate on synthroid, no change in plan. Acquired hypothyroidism  Coronary artery disease of native artery of native heart with stable angina pectoris (HCC)  -     metoprolol succinate (TOPROL XL) 25 MG extended release tablet; Take 1 tablet by mouth daily, Disp-90 tablet, R-1Adjust Sig  Essential hypertension  Fibromyalgia  Abnormal nuclear stress test  S/P gastric bypass  Diabetes mellitus without complication (HCC)      No follow-ups on file. Kelli Irizarry, DO     This document may have been prepared at least partially through the use of voice recognition software. Although effort is taken to assure the accuracy of this document, it is possible that grammatical, syntax,  or spelling errors may occur.

## 2023-06-30 DIAGNOSIS — I25.118 CORONARY ARTERY DISEASE OF NATIVE ARTERY OF NATIVE HEART WITH STABLE ANGINA PECTORIS (HCC): ICD-10-CM

## 2023-06-30 RX ORDER — ROSUVASTATIN CALCIUM 10 MG/1
10 TABLET, COATED ORAL DAILY
Qty: 90 TABLET | Refills: 1 | Status: SHIPPED | OUTPATIENT
Start: 2023-06-30 | End: 2023-12-27

## 2023-07-06 DIAGNOSIS — E11.9 DIABETES MELLITUS WITHOUT COMPLICATION (HCC): ICD-10-CM

## 2023-07-06 RX ORDER — DULAGLUTIDE 1.5 MG/.5ML
1.5 INJECTION, SOLUTION SUBCUTANEOUS WEEKLY
Qty: 12 ADJUSTABLE DOSE PRE-FILLED PEN SYRINGE | Refills: 1 | Status: SHIPPED | OUTPATIENT
Start: 2023-07-06

## 2023-08-07 DIAGNOSIS — I10 ESSENTIAL HYPERTENSION: ICD-10-CM

## 2023-08-07 DIAGNOSIS — Z76.0 MEDICATION REFILL: ICD-10-CM

## 2023-08-08 RX ORDER — AMLODIPINE BESYLATE 5 MG/1
TABLET ORAL
Qty: 90 TABLET | Refills: 3 | Status: SHIPPED | OUTPATIENT
Start: 2023-08-08

## 2023-08-08 NOTE — TELEPHONE ENCOUNTER
Last Appointment:  6/9/2023  No future appointments.      Medication Dispense Information    AMLODIPINE BESYLATE 5 MG TAB   Dispensed: 5/27/2023 12:00 AM   Unit strength: 5 MG   Unit form: Tablet Therapy Pack   Days supply: 90   Quantity: 90 tablet   Prior authorization number: [id number]   Pharmacy: [address] [place] [address],[address] [place] [address] [phone] - F [phone]   [address] [place], [place] [address] [id number]   Phone: [phone]   Fax: [phone]

## 2023-08-16 DIAGNOSIS — Z76.0 MEDICATION REFILL: ICD-10-CM

## 2023-08-16 RX ORDER — NYSTATIN 100000 U/G
CREAM TOPICAL
Qty: 15 G | Refills: 3 | Status: SHIPPED | OUTPATIENT
Start: 2023-08-16

## 2023-08-30 RX ORDER — MONTELUKAST SODIUM 10 MG/1
TABLET ORAL
Qty: 90 TABLET | Refills: 0 | Status: SHIPPED | OUTPATIENT
Start: 2023-08-30

## 2023-09-12 DIAGNOSIS — E11.9 DIABETES MELLITUS WITHOUT COMPLICATION (HCC): ICD-10-CM

## 2023-09-26 DIAGNOSIS — I25.118 CORONARY ARTERY DISEASE OF NATIVE ARTERY OF NATIVE HEART WITH STABLE ANGINA PECTORIS (HCC): ICD-10-CM

## 2023-09-26 RX ORDER — METOPROLOL SUCCINATE 50 MG/1
50 TABLET, EXTENDED RELEASE ORAL DAILY
Qty: 90 TABLET | Refills: 3 | OUTPATIENT
Start: 2023-09-26

## 2023-11-06 ENCOUNTER — PATIENT MESSAGE (OUTPATIENT)
Dept: FAMILY MEDICINE CLINIC | Age: 48
End: 2023-11-06

## 2023-11-06 DIAGNOSIS — E11.9 DIABETES MELLITUS WITHOUT COMPLICATION (HCC): ICD-10-CM

## 2023-11-06 NOTE — TELEPHONE ENCOUNTER
Nirav DE JESUS yx [facility] Clinical Staff (supporting Ihsan Rhoades DO) 4 hours ago (10:02 AM)       So I woke up to no refills of 3 mg trulicity. Can you call that in for me asap please? Rosa Bruner is having spine surgery in [facility] tomorrow so I will be up there starting at 3 pm today. U can call it in to [facility] right there by your office. Thank u.    You are awesome

## 2023-11-07 RX ORDER — DULAGLUTIDE 1.5 MG/.5ML
1.5 INJECTION, SOLUTION SUBCUTANEOUS WEEKLY
Qty: 12 ADJUSTABLE DOSE PRE-FILLED PEN SYRINGE | Refills: 1 | Status: SHIPPED | OUTPATIENT
Start: 2023-11-07

## 2023-11-15 RX ORDER — MONTELUKAST SODIUM 10 MG/1
TABLET ORAL
Qty: 90 TABLET | Refills: 3 | Status: SHIPPED | OUTPATIENT
Start: 2023-11-15

## 2023-11-20 ENCOUNTER — TELEPHONE (OUTPATIENT)
Dept: FAMILY MEDICINE CLINIC | Age: 48
End: 2023-11-20

## 2023-11-20 NOTE — TELEPHONE ENCOUNTER
Pt is having Bi-PAP pressure too high due to 155# weight loss. Spoke with  [facility], order needs to be sent to get pressure set to auto, but needs doctors order .   Fax: [phone]  Please advise pt

## 2023-12-01 DIAGNOSIS — Z12.31 SCREENING MAMMOGRAM FOR BREAST CANCER: ICD-10-CM

## 2023-12-08 ENCOUNTER — HOSPITAL ENCOUNTER (EMERGENCY)
Age: 48
Discharge: ANOTHER HOSPITAL | End: 2023-12-09
Attending: EMERGENCY MEDICINE
Payer: OTHER GOVERNMENT

## 2023-12-08 ENCOUNTER — APPOINTMENT (OUTPATIENT)
Dept: CT IMAGING | Age: 48
End: 2023-12-08
Payer: OTHER GOVERNMENT

## 2023-12-08 DIAGNOSIS — R10.13 EPIGASTRIC PAIN: Primary | ICD-10-CM

## 2023-12-08 DIAGNOSIS — Z98.84 S/P GASTRIC BYPASS: ICD-10-CM

## 2023-12-08 DIAGNOSIS — R11.2 NAUSEA AND VOMITING, UNSPECIFIED VOMITING TYPE: ICD-10-CM

## 2023-12-08 LAB
ALBUMIN SERPL-MCNC: 3.9 G/DL (ref 3.5–5.2)
ALP SERPL-CCNC: 88 U/L (ref 35–104)
ALT SERPL-CCNC: 37 U/L (ref 0–32)
ANION GAP SERPL CALCULATED.3IONS-SCNC: 12 MMOL/L (ref 7–16)
AST SERPL-CCNC: 29 U/L (ref 0–31)
BASOPHILS # BLD: 0.03 K/UL (ref 0–0.2)
BASOPHILS NFR BLD: 1 % (ref 0–2)
BILIRUB DIRECT SERPL-MCNC: 0.2 MG/DL (ref 0–0.3)
BILIRUB INDIRECT SERPL-MCNC: 0.5 MG/DL (ref 0–1)
BILIRUB SERPL-MCNC: 0.7 MG/DL (ref 0–1.2)
BILIRUB UR QL STRIP: NEGATIVE
BUN SERPL-MCNC: 10 MG/DL (ref 6–20)
CALCIUM SERPL-MCNC: 8.9 MG/DL (ref 8.6–10.2)
CHLORIDE SERPL-SCNC: 103 MMOL/L (ref 98–107)
CLARITY UR: CLEAR
CO2 SERPL-SCNC: 24 MMOL/L (ref 22–29)
COLOR UR: YELLOW
COMMENT: ABNORMAL
CREAT SERPL-MCNC: 0.8 MG/DL (ref 0.5–1)
EOSINOPHIL # BLD: 0.08 K/UL (ref 0.05–0.5)
EOSINOPHILS RELATIVE PERCENT: 2 % (ref 0–6)
ERYTHROCYTE [DISTWIDTH] IN BLOOD BY AUTOMATED COUNT: 13.1 % (ref 11.5–15)
GFR SERPL CREATININE-BSD FRML MDRD: >60 ML/MIN/1.73M2
GLUCOSE SERPL-MCNC: 81 MG/DL (ref 74–99)
GLUCOSE UR STRIP-MCNC: NEGATIVE MG/DL
HCT VFR BLD AUTO: 42.5 % (ref 34–48)
HGB BLD-MCNC: 13.9 G/DL (ref 11.5–15.5)
HGB UR QL STRIP.AUTO: NEGATIVE
IMM GRANULOCYTES # BLD AUTO: <0.03 K/UL (ref 0–0.58)
IMM GRANULOCYTES NFR BLD: 0 % (ref 0–5)
KETONES UR STRIP-MCNC: NEGATIVE MG/DL
LACTATE BLDV-SCNC: 0.6 MMOL/L (ref 0.5–2.2)
LEUKOCYTE ESTERASE UR QL STRIP: NEGATIVE
LIPASE SERPL-CCNC: 16 U/L (ref 13–60)
LYMPHOCYTES NFR BLD: 1.68 K/UL (ref 1.5–4)
LYMPHOCYTES RELATIVE PERCENT: 35 % (ref 20–42)
MCH RBC QN AUTO: 31.2 PG (ref 26–35)
MCHC RBC AUTO-ENTMCNC: 32.7 G/DL (ref 32–34.5)
MCV RBC AUTO: 95.5 FL (ref 80–99.9)
MONOCYTES NFR BLD: 0.24 K/UL (ref 0.1–0.95)
MONOCYTES NFR BLD: 5 % (ref 2–12)
NEUTROPHILS NFR BLD: 57 % (ref 43–80)
NEUTS SEG NFR BLD: 2.75 K/UL (ref 1.8–7.3)
NITRITE UR QL STRIP: NEGATIVE
PH UR STRIP: 7 [PH] (ref 5–9)
PLATELET # BLD AUTO: 205 K/UL (ref 130–450)
PMV BLD AUTO: 10.1 FL (ref 7–12)
POTASSIUM SERPL-SCNC: 3.8 MMOL/L (ref 3.5–5)
PROT SERPL-MCNC: 6.6 G/DL (ref 6.4–8.3)
PROT UR STRIP-MCNC: NEGATIVE MG/DL
RBC # BLD AUTO: 4.45 M/UL (ref 3.5–5.5)
SODIUM SERPL-SCNC: 139 MMOL/L (ref 132–146)
SP GR UR STRIP: 1.01 (ref 1–1.03)
UROBILINOGEN UR STRIP-ACNC: 2 EU/DL (ref 0–1)
WBC OTHER # BLD: 4.8 K/UL (ref 4.5–11.5)

## 2023-12-08 PROCEDURE — 82248 BILIRUBIN DIRECT: CPT

## 2023-12-08 PROCEDURE — 96372 THER/PROPH/DIAG INJ SC/IM: CPT

## 2023-12-08 PROCEDURE — 6360000002 HC RX W HCPCS

## 2023-12-08 PROCEDURE — 74177 CT ABD & PELVIS W/CONTRAST: CPT

## 2023-12-08 PROCEDURE — 85025 COMPLETE CBC W/AUTO DIFF WBC: CPT

## 2023-12-08 PROCEDURE — 6360000004 HC RX CONTRAST MEDICATION: Performed by: RADIOLOGY

## 2023-12-08 PROCEDURE — 80053 COMPREHEN METABOLIC PANEL: CPT

## 2023-12-08 PROCEDURE — 99285 EMERGENCY DEPT VISIT HI MDM: CPT

## 2023-12-08 PROCEDURE — 83690 ASSAY OF LIPASE: CPT

## 2023-12-08 PROCEDURE — 81003 URINALYSIS AUTO W/O SCOPE: CPT

## 2023-12-08 PROCEDURE — 83605 ASSAY OF LACTIC ACID: CPT

## 2023-12-08 PROCEDURE — 96374 THER/PROPH/DIAG INJ IV PUSH: CPT

## 2023-12-08 RX ORDER — DICYCLOMINE HYDROCHLORIDE 10 MG/ML
20 INJECTION INTRAMUSCULAR
Status: COMPLETED | OUTPATIENT
Start: 2023-12-08 | End: 2023-12-08

## 2023-12-08 RX ORDER — ONDANSETRON 2 MG/ML
4 INJECTION INTRAMUSCULAR; INTRAVENOUS ONCE
Status: COMPLETED | OUTPATIENT
Start: 2023-12-08 | End: 2023-12-08

## 2023-12-08 RX ADMIN — ONDANSETRON 4 MG: 2 INJECTION INTRAMUSCULAR; INTRAVENOUS at 18:54

## 2023-12-08 RX ADMIN — DICYCLOMINE HYDROCHLORIDE 20 MG: 10 INJECTION, SOLUTION INTRAMUSCULAR at 18:54

## 2023-12-08 RX ADMIN — IOPAMIDOL 75 ML: 755 INJECTION, SOLUTION INTRAVENOUS at 21:56

## 2023-12-08 RX ADMIN — IOPAMIDOL 18 ML: 755 INJECTION, SOLUTION INTRAVENOUS at 21:56

## 2023-12-08 ASSESSMENT — ENCOUNTER SYMPTOMS
DIARRHEA: 0
VOICE CHANGE: 0
ABDOMINAL PAIN: 1
TROUBLE SWALLOWING: 0
COUGH: 0
PHOTOPHOBIA: 0
NAUSEA: 1
WHEEZING: 0
CONSTIPATION: 1
VOMITING: 1
SHORTNESS OF BREATH: 0

## 2023-12-08 ASSESSMENT — PAIN DESCRIPTION - ORIENTATION: ORIENTATION: MID

## 2023-12-08 ASSESSMENT — PAIN DESCRIPTION - LOCATION: LOCATION: ABDOMEN

## 2023-12-08 NOTE — ED NOTES
Department of Emergency Medicine  FIRST PROVIDER TRIAGE NOTE             Independent MLP           12/8/23  5:10 PM EST    Date of Encounter: 12/8/23   MRN: [id number]      HPI: Aly Giles is a 46 y.o. female who presents to the ED for No chief complaint on file. Had gastric bypass surgery in 2021 at the [facility]. States has been having abdominal pain for last couple days she called the surgeon at the [facility] and was advised to come here if her pain persisted. They were trying to schedule an upper GI but was unable to do so until 18 December. ROS: Negative for cp or sob. PE: Gen Appearance/Constitutional: alert  CV: regular rate     Initial Plan of Care: All treatment areas with department are currently occupied. Plan to order/Initiate the following while awaiting opening in ED: labs and imaging studies.   Initiate Treatment-Testing, Proceed toTreatment Area When Bed Available for ED Attending/MLP to Continue Care    Electronically signed by SHANIQUA Dang CNP   DD: 12/8/23       SHANIQUA Gil CNP  12/08/23 2952

## 2023-12-09 ENCOUNTER — APPOINTMENT (OUTPATIENT)
Dept: GENERAL RADIOLOGY | Age: 48
End: 2023-12-09
Payer: OTHER GOVERNMENT

## 2023-12-09 ENCOUNTER — HOSPITAL ENCOUNTER (OUTPATIENT)
Age: 48
Setting detail: OBSERVATION
Discharge: ANOTHER HOSPITAL | End: 2023-12-10
Attending: FAMILY MEDICINE | Admitting: FAMILY MEDICINE
Payer: OTHER GOVERNMENT

## 2023-12-09 VITALS
OXYGEN SATURATION: 100 % | WEIGHT: 180 LBS | DIASTOLIC BLOOD PRESSURE: 97 MMHG | TEMPERATURE: 97.8 F | SYSTOLIC BLOOD PRESSURE: 151 MMHG | BODY MASS INDEX: 35.15 KG/M2 | HEART RATE: 63 BPM | RESPIRATION RATE: 18 BRPM

## 2023-12-09 PROBLEM — R10.9 ABDOMINAL PAIN: Status: ACTIVE | Noted: 2023-12-09

## 2023-12-09 PROCEDURE — 99222 1ST HOSP IP/OBS MODERATE 55: CPT | Performed by: INTERNAL MEDICINE

## 2023-12-09 PROCEDURE — 1200000000 HC SEMI PRIVATE

## 2023-12-09 PROCEDURE — G0379 DIRECT REFER HOSPITAL OBSERV: HCPCS

## 2023-12-09 PROCEDURE — 2580000003 HC RX 258

## 2023-12-09 PROCEDURE — 6370000000 HC RX 637 (ALT 250 FOR IP): Performed by: INTERNAL MEDICINE

## 2023-12-09 PROCEDURE — G0378 HOSPITAL OBSERVATION PER HR: HCPCS

## 2023-12-09 PROCEDURE — 2580000003 HC RX 258: Performed by: INTERNAL MEDICINE

## 2023-12-09 RX ORDER — MIDAZOLAM HYDROCHLORIDE 2 MG/2ML
0.5 INJECTION, SOLUTION INTRAMUSCULAR; INTRAVENOUS ONCE
Status: DISCONTINUED | OUTPATIENT
Start: 2023-12-09 | End: 2023-12-09 | Stop reason: HOSPADM

## 2023-12-09 RX ORDER — SODIUM CHLORIDE 0.9 % (FLUSH) 0.9 %
5-40 SYRINGE (ML) INJECTION EVERY 12 HOURS SCHEDULED
Status: DISCONTINUED | OUTPATIENT
Start: 2023-12-09 | End: 2023-12-10 | Stop reason: HOSPADM

## 2023-12-09 RX ORDER — SODIUM CHLORIDE 9 MG/ML
INJECTION, SOLUTION INTRAVENOUS PRN
Status: DISCONTINUED | OUTPATIENT
Start: 2023-12-09 | End: 2023-12-10 | Stop reason: HOSPADM

## 2023-12-09 RX ORDER — ONDANSETRON 4 MG/1
4 TABLET, ORALLY DISINTEGRATING ORAL EVERY 8 HOURS PRN
Status: DISCONTINUED | OUTPATIENT
Start: 2023-12-09 | End: 2023-12-10 | Stop reason: HOSPADM

## 2023-12-09 RX ORDER — SODIUM CHLORIDE, SODIUM LACTATE, POTASSIUM CHLORIDE, CALCIUM CHLORIDE 600; 310; 30; 20 MG/100ML; MG/100ML; MG/100ML; MG/100ML
INJECTION, SOLUTION INTRAVENOUS CONTINUOUS
Status: DISCONTINUED | OUTPATIENT
Start: 2023-12-09 | End: 2023-12-10 | Stop reason: HOSPADM

## 2023-12-09 RX ORDER — ONDANSETRON 2 MG/ML
4 INJECTION INTRAMUSCULAR; INTRAVENOUS EVERY 6 HOURS PRN
Status: DISCONTINUED | OUTPATIENT
Start: 2023-12-09 | End: 2023-12-10 | Stop reason: HOSPADM

## 2023-12-09 RX ORDER — MECOBALAMIN 5000 MCG
5 TABLET,DISINTEGRATING ORAL NIGHTLY
Status: DISCONTINUED | OUTPATIENT
Start: 2023-12-09 | End: 2023-12-10 | Stop reason: HOSPADM

## 2023-12-09 RX ORDER — ENOXAPARIN SODIUM 100 MG/ML
40 INJECTION SUBCUTANEOUS DAILY
Status: DISCONTINUED | OUTPATIENT
Start: 2023-12-09 | End: 2023-12-10 | Stop reason: HOSPADM

## 2023-12-09 RX ORDER — ACETAMINOPHEN 650 MG/1
650 SUPPOSITORY RECTAL EVERY 6 HOURS PRN
Status: DISCONTINUED | OUTPATIENT
Start: 2023-12-09 | End: 2023-12-10 | Stop reason: HOSPADM

## 2023-12-09 RX ORDER — SODIUM CHLORIDE 0.9 % (FLUSH) 0.9 %
5-40 SYRINGE (ML) INJECTION PRN
Status: DISCONTINUED | OUTPATIENT
Start: 2023-12-09 | End: 2023-12-10 | Stop reason: HOSPADM

## 2023-12-09 RX ORDER — POLYETHYLENE GLYCOL 3350 17 G/17G
17 POWDER, FOR SOLUTION ORAL DAILY PRN
Status: DISCONTINUED | OUTPATIENT
Start: 2023-12-09 | End: 2023-12-10 | Stop reason: HOSPADM

## 2023-12-09 RX ORDER — ACETAMINOPHEN 325 MG/1
650 TABLET ORAL EVERY 6 HOURS PRN
Status: DISCONTINUED | OUTPATIENT
Start: 2023-12-09 | End: 2023-12-10 | Stop reason: HOSPADM

## 2023-12-09 RX ORDER — SODIUM CHLORIDE 9 MG/ML
INJECTION, SOLUTION INTRAVENOUS CONTINUOUS
Status: DISCONTINUED | OUTPATIENT
Start: 2023-12-09 | End: 2023-12-09 | Stop reason: HOSPADM

## 2023-12-09 RX ADMIN — SODIUM CHLORIDE, POTASSIUM CHLORIDE, SODIUM LACTATE AND CALCIUM CHLORIDE: 600; 310; 30; 20 INJECTION, SOLUTION INTRAVENOUS at 15:54

## 2023-12-09 RX ADMIN — Medication 5 MG: at 20:38

## 2023-12-09 RX ADMIN — SODIUM CHLORIDE: 9 INJECTION, SOLUTION INTRAVENOUS at 04:00

## 2023-12-09 ASSESSMENT — PAIN SCALES - GENERAL: PAINLEVEL_OUTOF10: 0

## 2023-12-09 NOTE — ED NOTES
Patient given contrast dye to drink at 2000. Pt aware to drink second glass at 2040.       Eliazar Keane RN  12/08/23 1954

## 2023-12-09 NOTE — ED NOTES
Pt finished with drinking second glass of dye and CT notified.       Catherine Payton RN  12/08/23 2044

## 2023-12-09 NOTE — ED NOTES
Patient was signed out to me awaiting CT scan results. 52-year-old lady with a history of Chasidy-en-Y gastric bypass in 2022 presented with abdominal pain and nausea. CT scan suggestive of mesenteric swirl concerning for internal hernia/small bowel mesenteric torsion. On my assessment, she was hemodynamically stable complaining of nausea. Abdomen is soft, mild tenderness present. The possible complication of bariatric surgery, protocol at the [facility] surgery who accepted the patient for transfer but wanted general surgery to evaluate the patient in case of acute emergency. I spoke to the bariatric surgery team at [facility] and they accepted the patient to be admitted to medicine. NG tube placement orders placed in the emergency room and awaiting transfer to [facility]. Patient refused NG tube placement. ED Course as of 12/09/23 0507   Fri Dec 08, 2023   2329 Call out to [facility] surgery. [SD]   Sat Dec 09, 2023   0017 Spoke to the Chief surgical resident in [facility], said that she does not have admitting privileges and the access centre should reach out to the surgeon. Access centre trying to reach out.  [SD]   0050 I spoke with Dr Elías eLos,  [SD]   2546 Patient not having peritoneal signs or rigidity or guarding. She was having significantly more pain last night. States her pain is 4 out of 10 right now but it was much more severe yesterday. Offered pain medication but she says she is very sensitive to certain medications and would rather wait until her pain gets more severe before getting some these medications. [SO]   0100 I spoke to her surgeon in [facility], recommends NG decompression and evaluation by surgical team here in need of acute surgical intervention. He will initiate the transfer and accepted the patient [SD]   0136 I spoke to Dr Tracey Khan surgery form [facility]. He wants to transfer her and admit to [facility].   [SD]   2350 No beds in [facility] but will be available in the

## 2023-12-09 NOTE — ED NOTES
Assumed care of pt at this time; patient voicing concerns regarding NG tube placement due to having a history of bariatric surgery; this nurse expressed her concerns to the resident in ED; patient placed on monitor bp cycling; needs met; call bell in reach; will monitor     Tyra Haile RN  12/09/23 8087

## 2023-12-09 NOTE — H&P
[facility]   History and Physical      CHIEF COMPLAINT:  abdominal pain, diarrhea     History of Present Illness:  46 y.o. female with a history of gastric bypass surgery, anxiety, obstructive sleep apnea on home CPAP, primary hypertension, hypothyroidism, dyslipidemia, prediabetes presenting with abdominal pain and diarrhea. Over the last week or 2, she has had abdominal pain worse with eating, relieved a few hours after. She subsequently developed diarrhea, but diarrhea resolved and she has not had a bowel movement in 3 days. No fevers or chills. No melena or hematochezia. No known sick contacts. CT of the abdomen pelvis done at [facility] with [facility] shows possible mesenteric torsion versus internal hernia. Plan was to transfer to [facility], but bed was not available immediately so decision was made to transfer to [facility]. Prior to arrival to this facility, bed was available at [facility], but she was transferred here nonetheless. Now awaiting transport to [facility]. At this time she is awake, alert, denies any complaints. Informant(s) for H&P: Patient and chart review    REVIEW OF SYSTEMS:  no fevers, chills, cp, sob, n/v, ha, vision/hearing changes, wt changes, hot/cold flashes, other open skin lesions, diarrhea, constipation, dysuria/hematuria unless noted in HPI. Complete ROS performed with the patient and is otherwise negative. PMH:  Past Medical History:   Diagnosis Date    Anxiety     Asthma     Bladder leak     for OR 9-18-20     Chronic back pain 1989    Depression     Fibromyalgia     GERD (gastroesophageal reflux disease) 1988    Headache 1989    Hearing loss 2016    High cholesterol     Hypertension 2018? Hypothyroidism     Irritable bowel     Mitral valve prolapse     f/u w/ PCP only, no issues, no treatment     Obesity 2006    PONV (postoperative nausea and vomiting)     Prediabetes     Sleep apnea ?

## 2023-12-09 NOTE — DISCHARGE SUMMARY
Patient admitted while awaiting bed at [facility]. See H&P. Stable for transfer. NOTE: Portions of this report were transcribed using voice recognition software. Every effort was made to ensure accuracy; however, inadvertent computerized transcription errors may be present.     Electronically signed by Juanita Higuera DO on 12/9/2023 at 3:58 PM

## 2023-12-09 NOTE — ED NOTES
Bed Ready at [address] [place] [address] Dr [id number]  Report number [phone]     Airam Valdes RN  12/09/23 9791

## 2023-12-10 VITALS
DIASTOLIC BLOOD PRESSURE: 73 MMHG | RESPIRATION RATE: 17 BRPM | OXYGEN SATURATION: 98 % | TEMPERATURE: 98.2 F | SYSTOLIC BLOOD PRESSURE: 129 MMHG | HEART RATE: 68 BPM

## 2023-12-10 LAB
ALBUMIN SERPL-MCNC: 3.3 G/DL (ref 3.5–5.2)
ALP SERPL-CCNC: 77 U/L (ref 35–104)
ALT SERPL-CCNC: 26 U/L (ref 0–32)
ANION GAP SERPL CALCULATED.3IONS-SCNC: 11 MMOL/L (ref 7–16)
AST SERPL-CCNC: 23 U/L (ref 0–31)
BASOPHILS # BLD: 0.03 K/UL (ref 0–0.2)
BASOPHILS NFR BLD: 1 % (ref 0–2)
BILIRUB SERPL-MCNC: 0.8 MG/DL (ref 0–1.2)
BUN SERPL-MCNC: 7 MG/DL (ref 6–20)
CALCIUM SERPL-MCNC: 8.7 MG/DL (ref 8.6–10.2)
CHLORIDE SERPL-SCNC: 105 MMOL/L (ref 98–107)
CO2 SERPL-SCNC: 23 MMOL/L (ref 22–29)
CREAT SERPL-MCNC: 0.7 MG/DL (ref 0.5–1)
EOSINOPHIL # BLD: 0.09 K/UL (ref 0.05–0.5)
EOSINOPHILS RELATIVE PERCENT: 2 % (ref 0–6)
ERYTHROCYTE [DISTWIDTH] IN BLOOD BY AUTOMATED COUNT: 12.6 % (ref 11.5–15)
GFR SERPL CREATININE-BSD FRML MDRD: >60 ML/MIN/1.73M2
GLUCOSE SERPL-MCNC: 66 MG/DL (ref 74–99)
HCT VFR BLD AUTO: 37.7 % (ref 34–48)
HGB BLD-MCNC: 12.5 G/DL (ref 11.5–15.5)
IMM GRANULOCYTES # BLD AUTO: <0.03 K/UL (ref 0–0.58)
IMM GRANULOCYTES NFR BLD: 0 % (ref 0–5)
LYMPHOCYTES NFR BLD: 1.68 K/UL (ref 1.5–4)
LYMPHOCYTES RELATIVE PERCENT: 34 % (ref 20–42)
MCH RBC QN AUTO: 31.3 PG (ref 26–35)
MCHC RBC AUTO-ENTMCNC: 33.2 G/DL (ref 32–34.5)
MCV RBC AUTO: 94.3 FL (ref 80–99.9)
MONOCYTES NFR BLD: 0.3 K/UL (ref 0.1–0.95)
MONOCYTES NFR BLD: 6 % (ref 2–12)
NEUTROPHILS NFR BLD: 57 % (ref 43–80)
NEUTS SEG NFR BLD: 2.77 K/UL (ref 1.8–7.3)
PLATELET # BLD AUTO: 174 K/UL (ref 130–450)
PMV BLD AUTO: 10.2 FL (ref 7–12)
POTASSIUM SERPL-SCNC: 3.7 MMOL/L (ref 3.5–5)
PROT SERPL-MCNC: 5.7 G/DL (ref 6.4–8.3)
RBC # BLD AUTO: 4 M/UL (ref 3.5–5.5)
SODIUM SERPL-SCNC: 139 MMOL/L (ref 132–146)
WBC OTHER # BLD: 4.9 K/UL (ref 4.5–11.5)

## 2023-12-10 PROCEDURE — 85025 COMPLETE CBC W/AUTO DIFF WBC: CPT

## 2023-12-10 PROCEDURE — 36415 COLL VENOUS BLD VENIPUNCTURE: CPT

## 2023-12-10 PROCEDURE — 80053 COMPREHEN METABOLIC PANEL: CPT

## 2023-12-10 PROCEDURE — 2580000003 HC RX 258: Performed by: INTERNAL MEDICINE

## 2023-12-10 PROCEDURE — 99255 IP/OBS CONSLTJ NEW/EST HI 80: CPT | Performed by: SURGERY

## 2023-12-10 PROCEDURE — G0378 HOSPITAL OBSERVATION PER HR: HCPCS

## 2023-12-10 RX ADMIN — SODIUM CHLORIDE, PRESERVATIVE FREE 10 ML: 5 INJECTION INTRAVENOUS at 08:51

## 2023-12-10 NOTE — CONSULTS
Shoshana Elizabeth  12/10/2023      Surgical Consult    CHIEF COMPLAINT:  abdominal pain    HISTORY OF PRESENT ILLNESS:  Shoshana Elizabeth is a 46 y.o. female 2 years post gastric bypass at [facility], developed abdominal pain several days ago. CT scan shows swirling of the mesentery consistent with an internal hernia. She has requested to be transferred back to the [facility] for surgery. Past Medical History: She has a past medical history of Anxiety, Asthma, Bladder leak, Chronic back pain, Depression, Fibromyalgia, GERD (gastroesophageal reflux disease), Headache, Hearing loss, High cholesterol, Hypertension, Hypothyroidism, Irritable bowel, Mitral valve prolapse, Obesity, PONV (postoperative nausea and vomiting), Prediabetes, Sleep apnea, and Spinal stenosis. Past Surgical History: She has a past surgical history that includes Hysterectomy; laparoscopy; Colonoscopy; Dilatation, esophagus; Endoscopy, colon, diagnostic; Cystoscopy (N/A, 09/18/2020); eye surgery (27 months old eyes went out to walls); hernia repair (umbilical hernia age 1); Partial hysterectomy (2006); and Upper gastrointestinal endoscopy (1980's sometime). Allergies: Latex, Adhesive tape, Aspirin, Benadryl [diphenhydramine hcl], Codeine, Demerol, Famotidine, Meperidine, Pregabalin, and Phenergan [promethazine hcl]    Home Medicines:   Prior to Visit Medications    Medication Sig Taking?  Authorizing Provider   montelukast (SINGULAIR) 10 MG tablet TAKE 1 TABLET DAILY  Patient not taking: Reported on 12/9/2023  Bita Leaf, DO   dulaglutide (TRULICITY) 1.5 SK/2.0JW SC injection Inject 0.5 mLs into the skin once a week  Patient not taking: Reported on 12/9/2023  Darilyn Leaf, DO   nystatin (MYCOSTATIN) 653424 UNIT/GM cream APPLY TOPICALLY TWICE A DAY  Miky Goldberg APRN - CNP   amLODIPine (NORVASC) 5 MG tablet TAKE 1 TABLET DAILY  Darilyn Leaf, DO   rosuvastatin (CRESTOR) 10 MG tablet Take 1 tablet by mouth daily

## 2024-01-05 ENCOUNTER — TELEPHONE (OUTPATIENT)
Dept: FAMILY MEDICINE CLINIC | Age: 49
End: 2024-01-05

## 2024-01-05 NOTE — TELEPHONE ENCOUNTER
Pt calling about the results from her MRI from [facility] [place] [facility] on her Kidney. [facility] was to fax over the results.  She wanted to know if doctor wanted to see her and what is next.    Please advise [phone]

## 2024-01-28 DIAGNOSIS — I10 ESSENTIAL HYPERTENSION: ICD-10-CM

## 2024-01-28 DIAGNOSIS — Z76.0 MEDICATION REFILL: ICD-10-CM

## 2024-01-30 RX ORDER — LEVOTHYROXINE SODIUM 0.07 MG/1
75 TABLET ORAL DAILY
Qty: 90 TABLET | Refills: 3 | Status: SHIPPED | OUTPATIENT
Start: 2024-01-30

## 2024-01-30 RX ORDER — LOSARTAN POTASSIUM 100 MG/1
100 TABLET ORAL DAILY
Qty: 90 TABLET | Refills: 3 | Status: SHIPPED | OUTPATIENT
Start: 2024-01-30

## 2024-01-30 RX ORDER — AMLODIPINE BESYLATE 5 MG/1
5 TABLET ORAL DAILY
Qty: 90 TABLET | Refills: 3 | Status: SHIPPED | OUTPATIENT
Start: 2024-01-30

## 2024-03-15 ENCOUNTER — COMMUNITY OUTREACH (OUTPATIENT)
Dept: FAMILY MEDICINE CLINIC | Age: 49
End: 2024-03-15

## 2024-04-02 DIAGNOSIS — I25.118 CORONARY ARTERY DISEASE OF NATIVE ARTERY OF NATIVE HEART WITH STABLE ANGINA PECTORIS (HCC): ICD-10-CM

## 2024-04-03 RX ORDER — METOPROLOL SUCCINATE 25 MG/1
25 TABLET, EXTENDED RELEASE ORAL DAILY
Qty: 90 TABLET | Refills: 1 | Status: SHIPPED | OUTPATIENT
Start: 2024-04-03

## 2024-05-15 DIAGNOSIS — I25.118 CORONARY ARTERY DISEASE OF NATIVE ARTERY OF NATIVE HEART WITH STABLE ANGINA PECTORIS (HCC): ICD-10-CM

## 2024-05-15 RX ORDER — ROSUVASTATIN CALCIUM 10 MG/1
10 TABLET, COATED ORAL DAILY
Qty: 90 TABLET | Refills: 3 | Status: SHIPPED | OUTPATIENT
Start: 2024-05-15 | End: 2025-05-10

## 2024-05-15 NOTE — TELEPHONE ENCOUNTER
rosuvastatin (CRESTOR) 10 MG tablet       [facility]  Pt is also no longer on Trulicity, she ended up in the ED because to slowed her bowels down to nothing.

## 2024-05-15 NOTE — TELEPHONE ENCOUNTER
Last Appointment:  6/9/2023  No future appointments.     Sent a [employer] message to schedule an appt

## 2024-05-16 ASSESSMENT — PATIENT HEALTH QUESTIONNAIRE - PHQ9
4. FEELING TIRED OR HAVING LITTLE ENERGY: NEARLY EVERY DAY
9. THOUGHTS THAT YOU WOULD BE BETTER OFF DEAD, OR OF HURTING YOURSELF: NOT AT ALL
SUM OF ALL RESPONSES TO PHQ QUESTIONS 1-9: 3
SUM OF ALL RESPONSES TO PHQ QUESTIONS 1-9: 3
10. IF YOU CHECKED OFF ANY PROBLEMS, HOW DIFFICULT HAVE THESE PROBLEMS MADE IT FOR YOU TO DO YOUR WORK, TAKE CARE OF THINGS AT HOME, OR GET ALONG WITH OTHER PEOPLE: SOMEWHAT DIFFICULT
2. FEELING DOWN, DEPRESSED OR HOPELESS: NOT AT ALL
6. FEELING BAD ABOUT YOURSELF - OR THAT YOU ARE A FAILURE OR HAVE LET YOURSELF OR YOUR FAMILY DOWN: NOT AT ALL
8. MOVING OR SPEAKING SO SLOWLY THAT OTHER PEOPLE COULD HAVE NOTICED. OR THE OPPOSITE, BEING SO FIGETY OR RESTLESS THAT YOU HAVE BEEN MOVING AROUND A LOT MORE THAN USUAL: NOT AT ALL
SUM OF ALL RESPONSES TO PHQ QUESTIONS 1-9: 3
7. TROUBLE CONCENTRATING ON THINGS, SUCH AS READING THE NEWSPAPER OR WATCHING TELEVISION: NOT AT ALL
SUM OF ALL RESPONSES TO PHQ QUESTIONS 1-9: 3
5. POOR APPETITE OR OVEREATING: NOT AT ALL
8. MOVING OR SPEAKING SO SLOWLY THAT OTHER PEOPLE COULD HAVE NOTICED. OR THE OPPOSITE - BEING SO FIDGETY OR RESTLESS THAT YOU HAVE BEEN MOVING AROUND A LOT MORE THAN USUAL: NOT AT ALL
6. FEELING BAD ABOUT YOURSELF - OR THAT YOU ARE A FAILURE OR HAVE LET YOURSELF OR YOUR FAMILY DOWN: NOT AT ALL
1. LITTLE INTEREST OR PLEASURE IN DOING THINGS: NOT AT ALL
9. THOUGHTS THAT YOU WOULD BE BETTER OFF DEAD, OR OF HURTING YOURSELF: NOT AT ALL
2. FEELING DOWN, DEPRESSED OR HOPELESS: NOT AT ALL
7. TROUBLE CONCENTRATING ON THINGS, SUCH AS READING THE NEWSPAPER OR WATCHING TELEVISION: NOT AT ALL
10. IF YOU CHECKED OFF ANY PROBLEMS, HOW DIFFICULT HAVE THESE PROBLEMS MADE IT FOR YOU TO DO YOUR WORK, TAKE CARE OF THINGS AT HOME, OR GET ALONG WITH OTHER PEOPLE: SOMEWHAT DIFFICULT
3. TROUBLE FALLING OR STAYING ASLEEP: NOT AT ALL
SUM OF ALL RESPONSES TO PHQ9 QUESTIONS 1 & 2: 0
1. LITTLE INTEREST OR PLEASURE IN DOING THINGS: NOT AT ALL
5. POOR APPETITE OR OVEREATING: NOT AT ALL
4. FEELING TIRED OR HAVING LITTLE ENERGY: NEARLY EVERY DAY
SUM OF ALL RESPONSES TO PHQ QUESTIONS 1-9: 3
3. TROUBLE FALLING OR STAYING ASLEEP: NOT AT ALL

## 2024-05-20 SDOH — ECONOMIC STABILITY: INCOME INSECURITY: HOW HARD IS IT FOR YOU TO PAY FOR THE VERY BASICS LIKE FOOD, HOUSING, MEDICAL CARE, AND HEATING?: NOT HARD AT ALL

## 2024-05-20 SDOH — ECONOMIC STABILITY: FOOD INSECURITY: WITHIN THE PAST 12 MONTHS, YOU WORRIED THAT YOUR FOOD WOULD RUN OUT BEFORE YOU GOT MONEY TO BUY MORE.: NEVER TRUE

## 2024-05-20 SDOH — ECONOMIC STABILITY: FOOD INSECURITY: WITHIN THE PAST 12 MONTHS, THE FOOD YOU BOUGHT JUST DIDN'T LAST AND YOU DIDN'T HAVE MONEY TO GET MORE.: NEVER TRUE

## 2024-05-23 ENCOUNTER — OFFICE VISIT (OUTPATIENT)
Dept: FAMILY MEDICINE CLINIC | Age: 49
End: 2024-05-23
Payer: OTHER GOVERNMENT

## 2024-05-23 VITALS
HEIGHT: 60 IN | BODY MASS INDEX: 39.97 KG/M2 | RESPIRATION RATE: 18 BRPM | HEART RATE: 72 BPM | SYSTOLIC BLOOD PRESSURE: 124 MMHG | TEMPERATURE: 97.2 F | WEIGHT: 203.6 LBS | OXYGEN SATURATION: 99 % | DIASTOLIC BLOOD PRESSURE: 84 MMHG

## 2024-05-23 DIAGNOSIS — Z00.00 ANNUAL PHYSICAL EXAM: Primary | ICD-10-CM

## 2024-05-23 DIAGNOSIS — R93.429 ABNORMAL FINDING ON DIAGNOSTIC IMAGING OF KIDNEY: ICD-10-CM

## 2024-05-23 DIAGNOSIS — I10 ESSENTIAL HYPERTENSION: ICD-10-CM

## 2024-05-23 DIAGNOSIS — Z98.84 S/P GASTRIC BYPASS: ICD-10-CM

## 2024-05-23 PROCEDURE — 99396 PREV VISIT EST AGE 40-64: CPT | Performed by: STUDENT IN AN ORGANIZED HEALTH CARE EDUCATION/TRAINING PROGRAM

## 2024-05-23 PROCEDURE — 3079F DIAST BP 80-89 MM HG: CPT | Performed by: STUDENT IN AN ORGANIZED HEALTH CARE EDUCATION/TRAINING PROGRAM

## 2024-05-23 PROCEDURE — 3074F SYST BP LT 130 MM HG: CPT | Performed by: STUDENT IN AN ORGANIZED HEALTH CARE EDUCATION/TRAINING PROGRAM

## 2024-05-23 NOTE — PROGRESS NOTES
jaundice  Neuro: Alert and oriented        Psych: Appropriate mood and appropriate affect    Assessment and Plan     Exam wnl  Will fu specialist for kidney re-evaluation  Fu specialists for history bypass with low protein levels in past. She is trying to eat high protein diet.   HTN at goal.   History of issue with the cologuard, should consider colonoscopy with bariatric team or gen surg. Did have hernia surgery, doing well from this. No change in plan.   Remain off trulicity for various issues hypoglycemia, constipation, etc.     Annual physical exam  Abnormal finding on diagnostic imaging of kidney  S/P gastric bypass  Essential hypertension    No follow-ups on file.      Harris Marshall DO     This document may have been prepared at least partially through the use of voice recognition software. Although effort is taken to assure the accuracy of this document, it is possible that grammatical, syntax,  or spelling errors may occur.

## 2024-07-26 DIAGNOSIS — I25.118 CORONARY ARTERY DISEASE OF NATIVE ARTERY OF NATIVE HEART WITH STABLE ANGINA PECTORIS (HCC): ICD-10-CM

## 2024-07-26 DIAGNOSIS — I10 ESSENTIAL HYPERTENSION: ICD-10-CM

## 2024-07-26 DIAGNOSIS — Z76.0 MEDICATION REFILL: ICD-10-CM

## 2024-07-26 RX ORDER — AMLODIPINE BESYLATE 5 MG/1
5 TABLET ORAL DAILY
Qty: 90 TABLET | Refills: 3 | OUTPATIENT
Start: 2024-07-26

## 2024-07-26 RX ORDER — METOPROLOL SUCCINATE 25 MG/1
25 TABLET, EXTENDED RELEASE ORAL DAILY
Qty: 90 TABLET | Refills: 1 | OUTPATIENT
Start: 2024-07-26

## 2024-08-13 ENCOUNTER — APPOINTMENT (OUTPATIENT)
Dept: GENERAL RADIOLOGY | Age: 49
End: 2024-08-13
Payer: OTHER MISCELLANEOUS

## 2024-08-13 ENCOUNTER — HOSPITAL ENCOUNTER (EMERGENCY)
Age: 49
Discharge: HOME | End: 2024-08-13
Payer: OTHER MISCELLANEOUS

## 2024-08-13 VITALS
SYSTOLIC BLOOD PRESSURE: 165 MMHG | TEMPERATURE: 98 F | DIASTOLIC BLOOD PRESSURE: 93 MMHG | OXYGEN SATURATION: 97 % | BODY MASS INDEX: 41.01 KG/M2 | WEIGHT: 210 LBS | HEART RATE: 72 BPM | RESPIRATION RATE: 16 BRPM
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
## 2024-08-13 DIAGNOSIS — S90.31XA CONTUSION OF RIGHT FOOT, INITIAL ENCOUNTER: ICD-10-CM

## 2024-08-13 PROCEDURE — 73630 X-RAY EXAM OF FOOT: CPT

## 2024-08-13 PROCEDURE — 99283 EMERGENCY DEPT VISIT LOW MDM: CPT

## 2024-08-13 ASSESSMENT — PAIN DESCRIPTION - DESCRIPTORS: DESCRIPTORS: ACHING;DISCOMFORT;DULL

## 2024-08-13 ASSESSMENT — PAIN - FUNCTIONAL ASSESSMENT: PAIN_FUNCTIONAL_ASSESSMENT: 0-10

## 2024-08-13 ASSESSMENT — PAIN DESCRIPTION - LOCATION: LOCATION: FOOT

## 2024-08-13 ASSESSMENT — PAIN SCALES - GENERAL: PAINLEVEL_OUTOF10: 7

## 2024-08-13 ASSESSMENT — PAIN DESCRIPTION - ORIENTATION: ORIENTATION: RIGHT
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
## 2024-08-16 DIAGNOSIS — I25.118 CORONARY ARTERY DISEASE OF NATIVE ARTERY OF NATIVE HEART WITH STABLE ANGINA PECTORIS (HCC): ICD-10-CM

## 2024-08-16 RX ORDER — METOPROLOL SUCCINATE 25 MG/1
25 TABLET, EXTENDED RELEASE ORAL DAILY
Qty: 90 TABLET | Refills: 3 | Status: SHIPPED | OUTPATIENT
Start: 2024-08-16

## 2024-08-26 DIAGNOSIS — Z76.0 MEDICATION REFILL: ICD-10-CM

## 2024-08-26 NOTE — TELEPHONE ENCOUNTER
Last Appointment:  5/23/2024  Future Appointments   Date Time Provider Department Center   11/19/2024  9:00 AM Harris Marshall DO [facility] DEP

## 2024-08-27 RX ORDER — NYSTATIN 100000 U/G
CREAM TOPICAL
Qty: 15 G | Refills: 5 | Status: SHIPPED | OUTPATIENT
Start: 2024-08-27

## 2024-10-05 ENCOUNTER — PATIENT MESSAGE (OUTPATIENT)
Dept: FAMILY MEDICINE CLINIC | Age: 49
End: 2024-10-05

## 2024-10-08 ENCOUNTER — PATIENT MESSAGE (OUTPATIENT)
Dept: FAMILY MEDICINE CLINIC | Age: 49
End: 2024-10-08

## 2024-10-09 DIAGNOSIS — M79.7 FIBROMYALGIA: Primary | ICD-10-CM

## 2024-10-10 ENCOUNTER — TELEPHONE (OUTPATIENT)
Dept: FAMILY MEDICINE CLINIC | Age: 49
End: 2024-10-10

## 2024-10-10 DIAGNOSIS — I25.118 CORONARY ARTERY DISEASE OF NATIVE ARTERY OF NATIVE HEART WITH STABLE ANGINA PECTORIS (HCC): ICD-10-CM

## 2024-10-10 DIAGNOSIS — I10 ESSENTIAL HYPERTENSION: ICD-10-CM

## 2024-10-10 DIAGNOSIS — Z76.0 MEDICATION REFILL: ICD-10-CM

## 2024-10-10 RX ORDER — LOSARTAN POTASSIUM 100 MG/1
100 TABLET ORAL DAILY
Qty: 90 TABLET | Refills: 3 | Status: CANCELLED | OUTPATIENT
Start: 2024-10-10

## 2024-10-10 RX ORDER — METOPROLOL SUCCINATE 25 MG/1
25 TABLET, EXTENDED RELEASE ORAL DAILY
Qty: 90 TABLET | Refills: 3 | Status: CANCELLED | OUTPATIENT
Start: 2024-10-10

## 2024-10-10 RX ORDER — LEVOTHYROXINE SODIUM 75 UG/1
75 TABLET ORAL DAILY
Qty: 90 TABLET | Refills: 3 | Status: CANCELLED | OUTPATIENT
Start: 2024-10-10

## 2024-10-10 RX ORDER — NYSTATIN 100000 U/G
CREAM TOPICAL
Qty: 15 G | Refills: 5 | Status: CANCELLED | OUTPATIENT
Start: 2024-10-10

## 2024-10-10 RX ORDER — ONDANSETRON 4 MG/1
4 TABLET, FILM COATED ORAL EVERY 8 HOURS PRN
Status: CANCELLED | OUTPATIENT
Start: 2024-10-10

## 2024-10-10 RX ORDER — AMLODIPINE BESYLATE 5 MG/1
5 TABLET ORAL DAILY
Qty: 90 TABLET | Refills: 3 | Status: CANCELLED | OUTPATIENT
Start: 2024-10-10

## 2024-10-10 RX ORDER — ROSUVASTATIN CALCIUM 10 MG/1
10 TABLET, COATED ORAL DAILY
Qty: 90 TABLET | Refills: 3 | Status: CANCELLED | OUTPATIENT
Start: 2024-10-10 | End: 2025-10-05

## 2024-10-10 NOTE — TELEPHONE ENCOUNTER
Pt called in requesting Rx for the following:   - nystatin (MYCOSTATIN) 537093 UNIT/GM cream   - metoprolol succinate (TOPROL XL) 25 MG extended release tablet   -rosuvastatin (CRESTOR) 10 MG tablet   -amLODIPine (NORVASC) 5 MG tablet   -losartan (COZAAR) 100 MG tablet and    - ondansetron (ZOFRAN) 4 MG tablet  Pt would like these to go to [facility], [address].

## 2024-10-10 NOTE — TELEPHONE ENCOUNTER
Pt called back in reports that [facility] texted her at 3:02pm stating they are going to send out Rx's, however pt was told prior that they did not have her in the system nor did they ever have her taking Levothyroxine. Pt just wanted this information documented in chart.

## 2024-10-14 ENCOUNTER — PATIENT MESSAGE (OUTPATIENT)
Dept: FAMILY MEDICINE CLINIC | Age: 49
End: 2024-10-14

## 2024-10-15 NOTE — TELEPHONE ENCOUNTER
Patient states [facility] what [facility] but she hasn't gotten the order yet and has been out since Sunday. She wants this sent to [facility] please.

## 2024-10-16 DIAGNOSIS — I25.118 CORONARY ARTERY DISEASE OF NATIVE ARTERY OF NATIVE HEART WITH STABLE ANGINA PECTORIS (HCC): ICD-10-CM

## 2024-10-16 DIAGNOSIS — Z76.0 MEDICATION REFILL: ICD-10-CM

## 2024-10-16 DIAGNOSIS — I10 ESSENTIAL HYPERTENSION: ICD-10-CM

## 2024-10-16 RX ORDER — AMLODIPINE BESYLATE 5 MG/1
5 TABLET ORAL DAILY
Qty: 90 TABLET | Refills: 3 | Status: SHIPPED | OUTPATIENT
Start: 2024-10-16

## 2024-10-16 RX ORDER — ROSUVASTATIN CALCIUM 10 MG/1
10 TABLET, COATED ORAL DAILY
Qty: 90 TABLET | Refills: 3 | Status: SHIPPED | OUTPATIENT
Start: 2024-10-16 | End: 2025-10-11

## 2024-10-16 RX ORDER — METOPROLOL SUCCINATE 25 MG/1
25 TABLET, EXTENDED RELEASE ORAL DAILY
Qty: 90 TABLET | Refills: 3 | Status: SHIPPED | OUTPATIENT
Start: 2024-10-16

## 2024-10-16 RX ORDER — LOSARTAN POTASSIUM 100 MG/1
100 TABLET ORAL DAILY
Qty: 90 TABLET | Refills: 3 | Status: SHIPPED | OUTPATIENT
Start: 2024-10-16

## 2024-10-16 RX ORDER — ONDANSETRON 4 MG/1
4 TABLET, FILM COATED ORAL EVERY 8 HOURS PRN
Qty: 30 TABLET | Refills: 1 | Status: SHIPPED | OUTPATIENT
Start: 2024-10-16 | End: 2024-11-15

## 2024-10-16 RX ORDER — LEVOTHYROXINE SODIUM 75 UG/1
75 TABLET ORAL DAILY
Qty: 30 TABLET | Refills: 0 | Status: SHIPPED | OUTPATIENT
Start: 2024-10-16 | End: 2024-11-15

## 2024-10-16 RX ORDER — NYSTATIN 100000 U/G
CREAM TOPICAL
Qty: 15 G | Refills: 5 | Status: SHIPPED | OUTPATIENT
Start: 2024-10-16

## 2024-10-16 NOTE — TELEPHONE ENCOUNTER
Pt called in to check on status of Rx's, I advised pt Levothyroxine was sent to [facility] and all other Rx's were still pending. Pt does not [facility] use [facility] any longer and would like refills sent to [facility]. Please advise.

## 2024-10-28 ENCOUNTER — TELEPHONE (OUTPATIENT)
Dept: FAMILY MEDICINE CLINIC | Age: 49
End: 2024-10-28

## 2024-10-28 DIAGNOSIS — M79.7 FIBROMYALGIA: Primary | ICD-10-CM

## 2024-10-28 NOTE — TELEPHONE ENCOUNTER
Micheline from [facility] called saying that this pt needs [facility] for her Fibromyalgia   P [phone] and their F [phone].

## 2024-11-05 NOTE — TELEPHONE ENCOUNTER
Pt called for a refill on this med.    levothyroxine (SYNTHROID) 75 MCG     [id number]   [address] [phone] - f [phone]

## 2024-11-06 RX ORDER — LEVOTHYROXINE SODIUM 75 UG/1
75 TABLET ORAL DAILY
Qty: 30 TABLET | Refills: 5 | Status: SHIPPED | OUTPATIENT
Start: 2024-11-06 | End: 2025-05-05

## 2024-11-19 LAB — MAMMOGRAPHY, EXTERNAL: NORMAL

## 2024-12-10 DIAGNOSIS — G89.29 ACUTE EXACERBATION OF CHRONIC LOW BACK PAIN: Primary | ICD-10-CM

## 2024-12-10 DIAGNOSIS — M54.50 ACUTE EXACERBATION OF CHRONIC LOW BACK PAIN: Primary | ICD-10-CM

## 2024-12-10 PROBLEM — F41.1 GENERALIZED ANXIETY DISORDER: Status: ACTIVE | Noted: 2024-12-10

## 2024-12-10 RX ORDER — METHOCARBAMOL 500 MG/1
500 TABLET, FILM COATED ORAL 3 TIMES DAILY
Qty: 15 TABLET | Refills: 0 | Status: SHIPPED | OUTPATIENT
Start: 2024-12-10 | End: 2024-12-15

## 2024-12-10 RX ORDER — LEVOTHYROXINE SODIUM 75 UG/1
75 TABLET ORAL DAILY
Qty: 30 TABLET | Refills: 0 | Status: SHIPPED | OUTPATIENT
Start: 2024-12-10 | End: 2025-01-09

## 2024-12-10 NOTE — TELEPHONE ENCOUNTER
Pt was out of town and dropped Levothyroxine in the garbage, pt called pharmacy and they instructed pt to call PCP to get an emergency Rx sent so pharmacy can fill for pt. Please advise ASAP pt has been without medication since Saturday.   Pt is also requesting Rx for - Methocarbamol 500mg ( I did not see on pts medication list) Pt said that sciatica is acting up again and she has taken this in the past. Pt would like a call with response from doctor.

## 2024-12-16 ENCOUNTER — OFFICE VISIT (OUTPATIENT)
Dept: PRIMARY CARE CLINIC | Age: 49
End: 2024-12-16
Payer: OTHER GOVERNMENT

## 2024-12-16 VITALS
DIASTOLIC BLOOD PRESSURE: 93 MMHG | HEART RATE: 75 BPM | WEIGHT: 229 LBS | BODY MASS INDEX: 44.96 KG/M2 | HEIGHT: 60 IN | RESPIRATION RATE: 17 BRPM | OXYGEN SATURATION: 98 % | SYSTOLIC BLOOD PRESSURE: 153 MMHG | TEMPERATURE: 97.5 F

## 2024-12-16 DIAGNOSIS — J22 ACUTE LOWER RESPIRATORY TRACT INFECTION: ICD-10-CM

## 2024-12-16 DIAGNOSIS — R05.9 COUGH IN ADULT PATIENT: ICD-10-CM

## 2024-12-16 LAB
INFLUENZA A ANTIBODY: NORMAL
INFLUENZA B ANTIBODY: NORMAL
Lab: NORMAL
PERFORMING INSTRUMENT: NORMAL
QC PASS/FAIL: NORMAL
SARS-COV-2, POC: NORMAL

## 2024-12-16 PROCEDURE — 87426 SARSCOV CORONAVIRUS AG IA: CPT | Performed by: NURSE PRACTITIONER

## 2024-12-16 PROCEDURE — 3077F SYST BP >= 140 MM HG: CPT | Performed by: NURSE PRACTITIONER

## 2024-12-16 PROCEDURE — 87804 INFLUENZA ASSAY W/OPTIC: CPT | Performed by: NURSE PRACTITIONER

## 2024-12-16 PROCEDURE — 3080F DIAST BP >= 90 MM HG: CPT | Performed by: NURSE PRACTITIONER

## 2024-12-16 PROCEDURE — 99213 OFFICE O/P EST LOW 20 MIN: CPT | Performed by: NURSE PRACTITIONER

## 2024-12-16 RX ORDER — DOXYCYCLINE HYCLATE 100 MG
100 TABLET ORAL 2 TIMES DAILY
Qty: 20 TABLET | Refills: 0 | Status: SHIPPED | OUTPATIENT
Start: 2024-12-16 | End: 2024-12-26

## 2024-12-16 RX ORDER — BROMPHENIRAMINE MALEATE, PSEUDOEPHEDRINE HYDROCHLORIDE, AND DEXTROMETHORPHAN HYDROBROMIDE 2; 30; 10 MG/5ML; MG/5ML; MG/5ML
5 SYRUP ORAL 4 TIMES DAILY PRN
Qty: 118 ML | Refills: 0 | Status: SHIPPED | OUTPATIENT
Start: 2024-12-16

## 2024-12-16 RX ORDER — PREDNISONE 10 MG/1
10 TABLET ORAL 2 TIMES DAILY
Qty: 10 TABLET | Refills: 0 | Status: SHIPPED | OUTPATIENT
Start: 2024-12-16 | End: 2024-12-21

## 2024-12-16 NOTE — PROGRESS NOTES
Chief Complaint:   Cough, Shortness of Breath, and Congestion (Since friday)      History of Present Illness   Source of history provided by:  patient.      Trinidad Weinberg is a 49 y.o. old female with a past medical history of:   Past Medical History:   Diagnosis Date    Anxiety     Asthma     Bladder leak     for OR 9-18-20     Chronic back pain 1989    Depression     Fibromyalgia     GERD (gastroesophageal reflux disease) 1988    Headache 1989    Hearing loss 2016    High cholesterol     Hypertension 2018?    Hypothyroidism     Irritable bowel     Mitral valve prolapse     f/u w/ PCP only, no issues, no treatment     Obesity 2006    PONV (postoperative nausea and vomiting)     Prediabetes     Sleep apnea ?    Spinal stenosis        Pt  p[facility] with a cough/chest congestion/SOB for the past several days.  States the cough is  productive.  No  fever noted.    Denies any N/V/D, abdominal pain, CP,  dizziness, or lethargy.   Pt stated her son had similar symptoms        ROS    Unless otherwise stated in this report or unable to obtain because of the patient's clinical or mental status as evidenced by the medical record, this patients's positive and negative responses for Review of Systems, constitutional, psych, eyes, ENT, cardiovascular, respiratory, gastrointestinal, neurological, genitourinary, musculoskeletal, integument systems and systems related to the presenting problem are either stated in the preceding or were not pertinent or were negative for the symptoms and/or complaints related to the medical problem.    Past Surgical History:  has a past surgical history that includes Hysterectomy; laparoscopy; Colonoscopy; Dilatation, esophagus; Endoscopy, colon, diagnostic; Cystoscopy (N/A, 09/18/2020); eye surgery (30 months old eyes went out to walls); hernia repair (umbilical hernia age 1); Partial hysterectomy (2006); and Upper gastrointestinal endoscopy (1980's sometime).  Social History:

## 2024-12-19 NOTE — PROGRESS NOTES
ID OFFICE WAS NOTIFIED OF CONSULT. Subjective:      History was provided by the mother.    Luis Felipe Andrews is a 2 year old male who is brought infor this well-child visit.    Immunization History   Administered Date(s) Administered    DTaP 03/14/2022    DTaP/Hep B/IPV 02/08/2021, 04/05/2021, 06/07/2021    Hep A, ped/adol, 2 dose 12/17/2021, 06/27/2022    Hep B, Unspecified Formulation 2020    Hib (PRP-OMP) 02/08/2021, 04/05/2021, 03/14/2022    Influenza, split virus, quadrivalent, PF 09/30/2021, 11/01/2021, 10/24/2022, 10/06/2023    Influenza, split virus, trivalent, PF 10/04/2024    MMR 12/17/2021    Pneumococcal conjugate PCV 13 02/08/2021, 04/05/2021, 06/07/2021, 03/14/2022    Rotavirus - pentavalent 02/08/2021, 04/05/2021, 06/07/2021    Varicella 12/17/2021     Luis Felipe has Encounter for routine child health examination without abnormal findings and Astigmatism of right eye on their problem list.    Current Issues:  Current concerns include speech steadily improving.  Toilet trained? yes  Concerns regarding hearing? no  Does patient snore? no     Review of Nutrition:  Current diet: Good  Balanced diet? no    Social Screening:  Current child-care arrangements:   Sibling relations:  Good  Parental coping and self-care: doing well; no concerns  Opportunities for peer interaction? yes -   Concerns regarding behavior with peers? no  Secondhand smoke exposure? no    Screening Questions:  Risk factors for anemia: no  Risk factors for tuberculosis: no  Risk factors for lead toxicity: no  Risk factors for dyslipidemia: no    Objective:     Vitals:    12/19/24 1601   BP: 90/60   Pulse: 103   Temp: 97.3 °F (36.3 °C)   SpO2: 97%   Weight: 18.7 kg (41 lb 3.2 oz)   Height: 3' 4.39\" (1.026 m)     Growth parameters are noted and are appropriate for age.    General:   alert and cooperative   Gait:   normal   Skin:   normal, warm and dry, with normal turgor   Oral cavity:   lips, mucosa, and tongue normal; teeth and gums normal   Eyes:   sclerae white,  pupils equal and reactive, red reflex normal bilaterally   Ears:   normal bilaterally   Neck:   no adenopathy, no carotid bruit, no JVD, supple, symmetrical, trachea midline, and thyroid not enlarged, symmetric, no tenderness/mass/nodules   Lungs:  clear to auscultation bilaterally   Heart:   regular rate and rhythm, S1, S2 normal, no murmur, click, rub or gallop   Abdomen:  Soft, non-tender; bowel sounds normal; no masses, no hepatosplenomegaly   :  normal male - testes descended bilaterally   Extremities:   extremities normal, atraumatic, no cyanosis or edema   Neuro:  normal without focal findings, mental status, speech normal, alert and oriented x3, NANO, muscle tone and strength normal and symmetric, and reflexes normal and symmetric       Assessment:     Healthy 2 year old male child.  Vision screening shows a mild tendency towards a stigmatism and has been seen by ophthalmology-Dr. Danielson  He has pseudostrabismus at this time     Plan:     1. Anticipatory guidance discussed.  Gave handout on well-child issues at this age.    2.  Weight management:  The patient was counseled regarding nutrition and physical activity.    3. Development: appropriate for age    4. Immunizations today: per orders.  History of previous adverse reactions to immunizations? no    5. Follow-up visit in 1 year for next well child visit, or sooner as needed.    Jalil Saenz MD, FAAP

## 2024-12-20 ENCOUNTER — OFFICE VISIT (OUTPATIENT)
Dept: FAMILY MEDICINE CLINIC | Age: 49
End: 2024-12-20
Payer: OTHER GOVERNMENT

## 2024-12-20 VITALS
TEMPERATURE: 97 F | DIASTOLIC BLOOD PRESSURE: 75 MMHG | HEART RATE: 77 BPM | OXYGEN SATURATION: 99 % | WEIGHT: 228.9 LBS | HEIGHT: 60 IN | SYSTOLIC BLOOD PRESSURE: 117 MMHG | RESPIRATION RATE: 18 BRPM | BODY MASS INDEX: 44.94 KG/M2

## 2024-12-20 DIAGNOSIS — E03.9 ACQUIRED HYPOTHYROIDISM: ICD-10-CM

## 2024-12-20 DIAGNOSIS — M54.31 SCIATICA OF RIGHT SIDE: ICD-10-CM

## 2024-12-20 DIAGNOSIS — Z12.11 SCREEN FOR COLON CANCER: ICD-10-CM

## 2024-12-20 DIAGNOSIS — R73.01 ELEVATED FASTING GLUCOSE: ICD-10-CM

## 2024-12-20 DIAGNOSIS — I10 ESSENTIAL HYPERTENSION: ICD-10-CM

## 2024-12-20 DIAGNOSIS — R05.1 ACUTE COUGH: Primary | ICD-10-CM

## 2024-12-20 PROCEDURE — 3078F DIAST BP <80 MM HG: CPT | Performed by: STUDENT IN AN ORGANIZED HEALTH CARE EDUCATION/TRAINING PROGRAM

## 2024-12-20 PROCEDURE — 3074F SYST BP LT 130 MM HG: CPT | Performed by: STUDENT IN AN ORGANIZED HEALTH CARE EDUCATION/TRAINING PROGRAM

## 2024-12-20 PROCEDURE — 99214 OFFICE O/P EST MOD 30 MIN: CPT | Performed by: STUDENT IN AN ORGANIZED HEALTH CARE EDUCATION/TRAINING PROGRAM

## 2024-12-20 RX ORDER — METHOCARBAMOL 750 MG/1
750 TABLET, FILM COATED ORAL 2 TIMES DAILY
Qty: 20 TABLET | Refills: 0 | Status: SHIPPED | OUTPATIENT
Start: 2024-12-20 | End: 2024-12-30

## 2024-12-20 NOTE — PROGRESS NOTES
distress  HEENT: No scleral icterus. No visible discharge from eyes  Neck: Not rigid. No visible masses  Chest wall/Lung: Clear to auscultation bilaterally,  respirations unlabored. No ronchi/wheezing/rales  Heart: RRR, no murmur  Extremities:  No edema  Skin: No rashes. No jaundice  Neuro: Alert and oriented        Psych: Appropriate mood and appropriate affect    Assessment and Plan   Lungs sound clear, no change oin O2, moreso post nasal drip with sinusitis causing cough. Continue doxy. Repeat eval 2 weeks.     Pt with likely sciatica, no red flags. Can continue muscle relaxer which is helping but has to use very sparingly given hx. Re-eval 2 weeks for this also. Consider pt or pmr fu if not responding.    Had xray with likely nipple shadow can recheck at fu    Htn at goal today no change in plan - continue amlodipine 5, losartan 100  hypothyroid, uncertain control of the latter  Acute cough  Sciatica of right side  -     methocarbamol (ROBAXIN-750) 750 MG tablet; Take 1 tablet by mouth in the morning and at bedtime for 10 days, Disp-20 tablet, R-0Normal  Screen for colon cancer  -     Cologuard (Fecal DNA Colorectal Cancer Screening)  Essential hypertension  -     Lipid Panel; Future  -     Comprehensive Metabolic Panel; Future  -     CBC with Auto Differential; Future  Acquired hypothyroidism  -     TSH; Future  Elevated fasting glucose  -     Hemoglobin A1C; Future    Return in about 2 weeks (around 1/3/2025).      Harris Marshall,      This document may have been prepared at least partially through the use of voice recognition software. Although effort is taken to assure the accuracy of this document, it is possible that grammatical, syntax,  or spelling errors may occur.

## 2025-01-03 ENCOUNTER — OFFICE VISIT (OUTPATIENT)
Dept: FAMILY MEDICINE CLINIC | Age: 50
End: 2025-01-03
Payer: OTHER GOVERNMENT

## 2025-01-03 VITALS
SYSTOLIC BLOOD PRESSURE: 123 MMHG | WEIGHT: 230 LBS | HEIGHT: 60 IN | DIASTOLIC BLOOD PRESSURE: 77 MMHG | TEMPERATURE: 97 F | OXYGEN SATURATION: 99 % | RESPIRATION RATE: 18 BRPM | HEART RATE: 67 BPM | BODY MASS INDEX: 45.16 KG/M2

## 2025-01-03 DIAGNOSIS — R05.1 ACUTE COUGH: Primary | ICD-10-CM

## 2025-01-03 DIAGNOSIS — E66.01 MORBID OBESITY: ICD-10-CM

## 2025-01-03 DIAGNOSIS — Z98.84 S/P GASTRIC BYPASS: ICD-10-CM

## 2025-01-03 PROCEDURE — 3078F DIAST BP <80 MM HG: CPT | Performed by: STUDENT IN AN ORGANIZED HEALTH CARE EDUCATION/TRAINING PROGRAM

## 2025-01-03 PROCEDURE — 3074F SYST BP LT 130 MM HG: CPT | Performed by: STUDENT IN AN ORGANIZED HEALTH CARE EDUCATION/TRAINING PROGRAM

## 2025-01-03 PROCEDURE — 99213 OFFICE O/P EST LOW 20 MIN: CPT | Performed by: STUDENT IN AN ORGANIZED HEALTH CARE EDUCATION/TRAINING PROGRAM

## 2025-01-03 ASSESSMENT — PATIENT HEALTH QUESTIONNAIRE - PHQ9
SUM OF ALL RESPONSES TO PHQ9 QUESTIONS 1 & 2: 5
10. IF YOU CHECKED OFF ANY PROBLEMS, HOW DIFFICULT HAVE THESE PROBLEMS MADE IT FOR YOU TO DO YOUR WORK, TAKE CARE OF THINGS AT HOME, OR GET ALONG WITH OTHER PEOPLE: NOT DIFFICULT AT ALL
7. TROUBLE CONCENTRATING ON THINGS, SUCH AS READING THE NEWSPAPER OR WATCHING TELEVISION: NOT AT ALL
SUM OF ALL RESPONSES TO PHQ QUESTIONS 1-9: 11
3. TROUBLE FALLING OR STAYING ASLEEP: NEARLY EVERY DAY
8. MOVING OR SPEAKING SO SLOWLY THAT OTHER PEOPLE COULD HAVE NOTICED. OR THE OPPOSITE, BEING SO FIGETY OR RESTLESS THAT YOU HAVE BEEN MOVING AROUND A LOT MORE THAN USUAL: NOT AT ALL
1. LITTLE INTEREST OR PLEASURE IN DOING THINGS: NEARLY EVERY DAY
6. FEELING BAD ABOUT YOURSELF - OR THAT YOU ARE A FAILURE OR HAVE LET YOURSELF OR YOUR FAMILY DOWN: NOT AT ALL
SUM OF ALL RESPONSES TO PHQ QUESTIONS 1-9: 11
9. THOUGHTS THAT YOU WOULD BE BETTER OFF DEAD, OR OF HURTING YOURSELF: NOT AT ALL
SUM OF ALL RESPONSES TO PHQ QUESTIONS 1-9: 11
5. POOR APPETITE OR OVEREATING: NOT AT ALL
2. FEELING DOWN, DEPRESSED OR HOPELESS: MORE THAN HALF THE DAYS
SUM OF ALL RESPONSES TO PHQ QUESTIONS 1-9: 11
4. FEELING TIRED OR HAVING LITTLE ENERGY: NEARLY EVERY DAY

## 2025-01-03 NOTE — PROGRESS NOTES
Cholecalciferol (VITAMIN D) 2000 UNITS CAPS capsule Take 5,000 Units by mouth   Provider, MD Eric      Past Medical History:   Diagnosis Date    Anxiety     Asthma     Bladder leak     for OR 9-18-20     Chronic back pain 1989    Depression     Fibromyalgia     GERD (gastroesophageal reflux disease) 1988    Headache 1989    Hearing loss 2016    High cholesterol     Hypertension 2018?    Hypothyroidism     Irritable bowel     Mitral valve prolapse     f/u w/ PCP only, no issues, no treatment     Obesity 2006    PONV (postoperative nausea and vomiting)     Prediabetes     Sleep apnea ?    Spinal stenosis      Physical Exam   Vitals: /77   Pulse 67   Temp 97 °F (36.1 °C)   Resp 18   Ht 1.524 m (5')   Wt 104.3 kg (230 lb)   LMP  (LMP Unknown)   SpO2 99%   BMI 44.92 kg/m²   General Appearance: Alert, oriented, no acute distress  HEENT: No scleral icterus. No visible discharge from eyes  Neck: Not rigid. No visible masses  Chest wall/Lung: Clear to auscultation bilaterally,  respirations unlabored. No ronchi/wheezing/rales  Heart: RRR, no murmur  Extremities:  No edema  Skin: No rashes. No jaundice  Neuro: Alert and oriented        Psych: Appropriate mood and appropriate affect    Assessment and Plan   Re-eval cough in 2 weeks. Delsym as needed.   Can also discuss weight loss options. Had issues with glp1 causing blockage and hypoglycemia. Consider other options.   No si, hi. No worsening mood. Stable. Notes frustration with weight and persistent cough.       Acute cough  S/P gastric bypass  Morbid obesity    Return in about 2 weeks (around 1/17/2025).      Harris Marshall,      This document may have been prepared at least partially through the use of voice recognition software. Although effort is taken to assure the accuracy of this document, it is possible that grammatical, syntax,  or spelling errors may occur.

## 2025-01-08 ENCOUNTER — HOSPITAL ENCOUNTER (OUTPATIENT)
Age: 50
Discharge: HOME | End: 2025-01-08
Payer: OTHER GOVERNMENT

## 2025-01-08 ENCOUNTER — HOSPITAL ENCOUNTER (EMERGENCY)
Age: 50
Discharge: HOME | End: 2025-01-08
Attending: EMERGENCY MEDICINE
Payer: OTHER GOVERNMENT

## 2025-01-08 ENCOUNTER — TELEPHONE (OUTPATIENT)
Dept: FAMILY MEDICINE CLINIC | Age: 50
End: 2025-01-08

## 2025-01-08 ENCOUNTER — HOSPITAL ENCOUNTER (OUTPATIENT)
Dept: GENERAL RADIOLOGY | Age: 50
Discharge: HOME | End: 2025-01-10
Payer: OTHER GOVERNMENT

## 2025-01-08 VITALS
OXYGEN SATURATION: 99 % | TEMPERATURE: 97.9 F | DIASTOLIC BLOOD PRESSURE: 81 MMHG | SYSTOLIC BLOOD PRESSURE: 152 MMHG | RESPIRATION RATE: 18 BRPM | HEART RATE: 67 BPM

## 2025-01-08 DIAGNOSIS — I10 ESSENTIAL HYPERTENSION: ICD-10-CM

## 2025-01-08 DIAGNOSIS — J06.9 ACUTE UPPER RESPIRATORY INFECTION: Primary | ICD-10-CM

## 2025-01-08 DIAGNOSIS — E03.9 ACQUIRED HYPOTHYROIDISM: ICD-10-CM

## 2025-01-08 DIAGNOSIS — R05.3 CHRONIC COUGH: ICD-10-CM

## 2025-01-08 DIAGNOSIS — R05.3 CHRONIC COUGH: Primary | ICD-10-CM

## 2025-01-08 DIAGNOSIS — R73.01 ELEVATED FASTING GLUCOSE: ICD-10-CM

## 2025-01-08 DIAGNOSIS — E16.2 HYPOGLYCEMIA: ICD-10-CM

## 2025-01-08 LAB
ALBUMIN SERPL-MCNC: 4.2 G/DL (ref 3.5–5.2)
ALP SERPL-CCNC: 98 U/L (ref 35–104)
ALT SERPL-CCNC: 39 U/L (ref 0–32)
ANION GAP SERPL CALCULATED.3IONS-SCNC: 11 MMOL/L (ref 7–16)
AST SERPL-CCNC: 16 U/L (ref 0–31)
BASOPHILS # BLD: 0.04 K/UL (ref 0–0.2)
BASOPHILS NFR BLD: 1 % (ref 0–2)
BILIRUB SERPL-MCNC: 0.3 MG/DL (ref 0–1.2)
BUN SERPL-MCNC: 15 MG/DL (ref 6–20)
CALCIUM SERPL-MCNC: 9.3 MG/DL (ref 8.6–10.2)
CHLORIDE SERPL-SCNC: 106 MMOL/L (ref 98–107)
CHOLEST SERPL-MCNC: 155 MG/DL
CO2 SERPL-SCNC: 27 MMOL/L (ref 22–29)
CREAT SERPL-MCNC: 0.8 MG/DL (ref 0.5–1)
EOSINOPHIL # BLD: 0.12 K/UL (ref 0.05–0.5)
EOSINOPHILS RELATIVE PERCENT: 2 % (ref 0–6)
ERYTHROCYTE [DISTWIDTH] IN BLOOD BY AUTOMATED COUNT: 12.9 % (ref 11.5–15)
FLUAV RNA RESP QL NAA+PROBE: NOT DETECTED
FLUBV RNA RESP QL NAA+PROBE: NOT DETECTED
GFR, ESTIMATED: >90 ML/MIN/1.73M2
GLUCOSE BLD-MCNC: 91 MG/DL (ref 74–99)
GLUCOSE SERPL-MCNC: 39 MG/DL (ref 74–99)
HBA1C MFR BLD: 5.4 % (ref 4–5.6)
HCT VFR BLD AUTO: 43.5 % (ref 34–48)
HDLC SERPL-MCNC: 70 MG/DL
HGB BLD-MCNC: 14.4 G/DL (ref 11.5–15.5)
IMM GRANULOCYTES # BLD AUTO: <0.03 K/UL (ref 0–0.58)
IMM GRANULOCYTES NFR BLD: 0 % (ref 0–5)
LDLC SERPL CALC-MCNC: 67 MG/DL
LYMPHOCYTES NFR BLD: 2.36 K/UL (ref 1.5–4)
LYMPHOCYTES RELATIVE PERCENT: 36 % (ref 20–42)
MCH RBC QN AUTO: 30.4 PG (ref 26–35)
MCHC RBC AUTO-ENTMCNC: 33.1 G/DL (ref 32–34.5)
MCV RBC AUTO: 92 FL (ref 80–99.9)
MONOCYTES NFR BLD: 0.49 K/UL (ref 0.1–0.95)
MONOCYTES NFR BLD: 7 % (ref 2–12)
NEUTROPHILS NFR BLD: 54 % (ref 43–80)
NEUTS SEG NFR BLD: 3.59 K/UL (ref 1.8–7.3)
PLATELET # BLD AUTO: 249 K/UL (ref 130–450)
PMV BLD AUTO: 9.5 FL (ref 7–12)
POTASSIUM SERPL-SCNC: 3.8 MMOL/L (ref 3.5–5)
PROT SERPL-MCNC: 7.3 G/DL (ref 6.4–8.3)
RBC # BLD AUTO: 4.73 M/UL (ref 3.5–5.5)
SARS-COV-2 RNA RESP QL NAA+PROBE: NOT DETECTED
SODIUM SERPL-SCNC: 144 MMOL/L (ref 132–146)
SOURCE: NORMAL
SPECIMEN DESCRIPTION: NORMAL
TRIGL SERPL-MCNC: 91 MG/DL
VLDLC SERPL CALC-MCNC: 18 MG/DL
WBC OTHER # BLD: 6.6 K/UL (ref 4.5–11.5)

## 2025-01-08 PROCEDURE — 80061 LIPID PANEL: CPT

## 2025-01-08 PROCEDURE — 85025 COMPLETE CBC W/AUTO DIFF WBC: CPT

## 2025-01-08 PROCEDURE — 99283 EMERGENCY DEPT VISIT LOW MDM: CPT

## 2025-01-08 PROCEDURE — 82962 GLUCOSE BLOOD TEST: CPT

## 2025-01-08 PROCEDURE — 84443 ASSAY THYROID STIM HORMONE: CPT

## 2025-01-08 PROCEDURE — 80053 COMPREHEN METABOLIC PANEL: CPT

## 2025-01-08 PROCEDURE — 83036 HEMOGLOBIN GLYCOSYLATED A1C: CPT

## 2025-01-08 PROCEDURE — 36415 COLL VENOUS BLD VENIPUNCTURE: CPT

## 2025-01-08 PROCEDURE — 87636 SARSCOV2 & INF A&B AMP PRB: CPT

## 2025-01-08 PROCEDURE — 71046 X-RAY EXAM CHEST 2 VIEWS: CPT

## 2025-01-08 ASSESSMENT — LIFESTYLE VARIABLES
HOW MANY STANDARD DRINKS CONTAINING ALCOHOL DO YOU HAVE ON A TYPICAL DAY: PATIENT DOES NOT DRINK
HOW OFTEN DO YOU HAVE A DRINK CONTAINING ALCOHOL: NEVER

## 2025-01-08 NOTE — TELEPHONE ENCOUNTER
Called regarding patient labs-glucose was 39  Not on any diabetic medication  She has been sick recently-since early December, reports appetite has not been affected that much  Hx of gastric bypass  Did have some food, bread earlier today  Called and woke her up from a nap-reports she is fatigued and sweating  Recommended drinking and or eating something sugary and proceeding to the nearest emergency room to ensure that it goes up

## 2025-01-08 NOTE — TELEPHONE ENCOUNTER
Pt is wanting to know if chest XR can be ordered, pt went to get u/s done today but it was breast u/s and will not see the lungs, pt is wanting to go get done today if possible, states she has been a little emotional. Pt would like a call back with response.    Detail Level: Zone

## 2025-01-08 NOTE — PROGRESS NOTES
IMPRESSION:  New focal rounded density abutting against the left hemidiaphragm visualized  on the frontal projection. This may represent a nipple shadow.  Recommend  repeat exam with nipple markers.    Repeat cxr ordered for chronic cough 6 weeks and for re-eval previous density

## 2025-01-09 LAB — TSH SERPL DL<=0.05 MIU/L-ACNC: 2.32 UIU/ML (ref 0.27–4.2)

## 2025-01-09 NOTE — ED NOTES
Department of Emergency Medicine  FIRST PROVIDER TRIAGE NOTE             Independent MLP           1/8/25  9:19 PM EST    Date of Encounter: 1/8/25   MRN: [id number]      HPI: Trinidad Weinberg is a 49 y.o. female who presents to the ED for Cold Symptoms (Cough only currently, but did have sore throat and h/a) and Hypoglycemia (Pt states sugar on fasting labs this morning was 39, feels tired now)       ROS: Negative for fever.    PE: Gen Appearance/Constitutional: alert  Musculoskeletal: moves all extremities x 4    Initial Plan of Care: All treatment areas with department are currently occupied.      Plan to order/Initiate the following while awaiting opening in ED: Triage evaluation  .     Provider-Patient relationship only established for Provider In Triage (PIT).  Full assessment, HPI and examination not performed, therefore, it is not yet possible to state whether or not an emergency medical condition exists     Initial Plan of Care: Initiate Treatment-Testing, Proceed toTreatment Area When Bed Available for ED Attending/MLP to Continue Care  Secondary to high volume, low staffing, and/or boarding- patient to await bed availability.     This ends my PIT-Patient relationship.  Care of patient relinquished after triage         Electronically signed by SHANIQUA Cr CNP   DD: 1/8/25

## 2025-01-09 NOTE — ED PROVIDER NOTES
---------------------------------------------  Past Medical History:  has a past medical history of Anxiety, Asthma, Bladder leak, Chronic back pain, Depression, Fibromyalgia, GERD (gastroesophageal reflux disease), Headache, Hearing loss, High cholesterol, Hypertension, Hypothyroidism, Irritable bowel, Mitral valve prolapse, Obesity, PONV (postoperative nausea and vomiting), Prediabetes, Sleep apnea, and Spinal stenosis.    Past Surgical History:  has a past surgical history that includes Hysterectomy; laparoscopy; Colonoscopy; Dilatation, esophagus; Endoscopy, colon, diagnostic; Cystoscopy (N/A, 09/18/2020); eye surgery (30 months old eyes went out to walls); hernia repair (umbilical hernia age 1); Partial hysterectomy (2006); and Upper gastrointestinal endoscopy (1980's sometime).    Social History:  reports that she has never smoked. She has never used smokeless tobacco. She reports that she does not currently use alcohol. She reports that she does not use drugs.    Family History: family history includes Alcohol Abuse in her father; Arrhythmia in her father; Arthritis in her father; Atrial Fibrillation in her father; Coronary Art Dis in her father; Depression in her father and mother; Diabetes in her father and mother; Hearing Loss in her father; Heart Attack in her father; Heart Disease in her father and mother; High Blood Pressure in her father and mother; High Cholesterol in her father and mother; Mental Illness in her father and mother; Stroke in her maternal grandmother.     The patient’s home medications have been reviewed.    Allergies: Latex, Adhesive tape, Aspirin, Benadryl [diphenhydramine hcl], Codeine, Demerol, Famotidine, Meperidine, Pregabalin, and Phenergan [promethazine hcl]    REVIEW OF EXTERNAL NOTE :      Chart reviewed: Patient was seen in family [facility] office January 3 of this year for acute cough, status post gastric bypass and morbid obesity    Previous Echo reviewed by me:  Lab Results  Not Detected   POCT Glucose   Result Value Ref Range    POC Glucose 91 74 - 99 mg/dL       RADIOLOGY:   Non-plain film images such as CT, Ultrasound and MRI are read by the radiologist. Plain radiographic images are visualized and preliminarily interpreted by the ED Provider with the below findings:      Interpretation per the Radiologist below, if available at the time of this note:    No orders to display     XR CHEST (2 VW)    Result Date: 1/8/2025  EXAMINATION: TWO XRAY VIEWS OF THE CHEST 1/8/2025 4:57 pm COMPARISON: 07/14/2021 HISTORY: ORDERING SYSTEM PROVIDED HISTORY: Chronic cough TECHNOLOGIST PROVIDED HISTORY: Reason for exam:->chronic cough, re-eval new focal density noted on previous xray FINDINGS: The lungs are without acute focal process.  There is no effusion or pneumothorax. The cardiomediastinal silhouette is without acute process. The osseous structures are without acute process.     No acute process.       No results found.    Procedures:      ------------------------- NURSING NOTES AND VITALS REVIEWED ---------------------------   The nursing notes within the ED encounter and vital signs as below have been reviewed by myself and ED attending.  BP (!) 152/81   Pulse 67   Temp 97.9 °F (36.6 °C) (Oral)   Resp 18   LMP  (LMP Unknown)   SpO2 99%   Oxygen Saturation Interpretation: Normal    The patient’s available past medical records and past encounters were reviewed by myself and ED attending        ------------------------------ ED COURSE/MEDICAL DECISION MAKING----------------------    Medical Decision Making/Differential Diagnosis:    CC/HPI Summary, Pertinent Physical Exam Findings, Social Determinants of health, Records Reviewed, DDx, testing done/not done, ED Course, Reassessment, disposition considerations/shared decision making with patient, consults, disposition:      Medical Decision Making/ED COURSE:    Chronic Conditions affecting care:    has a past medical history of Anxiety,

## 2025-05-07 NOTE — TELEPHONE ENCOUNTER
Name of Medication(s) Requested:  Requested Prescriptions     Pending Prescriptions Disp Refills    levothyroxine (SYNTHROID) 75 MCG tablet [Pharmacy Med Name: LEVOTHYROXINE 0.075MG (75MCG) TABS] 30 tablet 0     Sig: TAKE 1 TABLET BY MOUTH DAILY       Medication is on current medication list Yes    Dosage and directions were verified? Yes    Quantity verified: 30 day supply     Pharmacy Verified?  Yes    Last Appointment:  1/3/2025    Future appts:  No future appointments.     (If no appt send self scheduling link. .REFILLAPPT)  Scheduling request sent?     [] Yes  [] No    Does patient need updated?  [] Yes  [] No

## 2025-05-08 RX ORDER — LEVOTHYROXINE SODIUM 75 UG/1
75 TABLET ORAL DAILY
Qty: 30 TABLET | Refills: 3 | Status: SHIPPED | OUTPATIENT
Start: 2025-05-08 | End: 2025-06-07

## 2025-06-07 ENCOUNTER — APPOINTMENT (OUTPATIENT)
Dept: CT IMAGING | Age: 50
End: 2025-06-07
Attending: EMERGENCY MEDICINE
Payer: OTHER GOVERNMENT

## 2025-06-07 LAB
ALBUMIN SERPL-MCNC: 4 G/DL (ref 3.5–5.2)
ALP SERPL-CCNC: 106 U/L (ref 35–104)
ALT SERPL-CCNC: 41 U/L (ref 0–35)
ANION GAP SERPL CALCULATED.3IONS-SCNC: 10 MMOL/L (ref 7–16)
AST SERPL-CCNC: 39 U/L (ref 0–35)
BASOPHILS # BLD: 0.04 K/UL (ref 0–0.2)
BASOPHILS NFR BLD: 0 % (ref 0–2)
BILIRUB SERPL-MCNC: 0.3 MG/DL (ref 0–1.2)
BUN SERPL-MCNC: 19 MG/DL (ref 6–20)
CALCIUM SERPL-MCNC: 9.4 MG/DL (ref 8.6–10)
CHLORIDE SERPL-SCNC: 106 MMOL/L (ref 98–107)
CO2 SERPL-SCNC: 24 MMOL/L (ref 22–29)
CREAT SERPL-MCNC: 0.7 MG/DL (ref 0.5–1)
EOSINOPHIL # BLD: 0.06 K/UL (ref 0.05–0.5)
EOSINOPHILS RELATIVE PERCENT: 1 % (ref 0–6)
ERYTHROCYTE [DISTWIDTH] IN BLOOD BY AUTOMATED COUNT: 13.2 % (ref 11.5–15)
GFR, ESTIMATED: >90 ML/MIN/1.73M2
GLUCOSE SERPL-MCNC: 138 MG/DL (ref 74–99)
HCT VFR BLD AUTO: 45.8 % (ref 34–48)
HGB BLD-MCNC: 15.2 G/DL (ref 11.5–15.5)
IMM GRANULOCYTES # BLD AUTO: <0.03 K/UL (ref 0–0.58)
IMM GRANULOCYTES NFR BLD: 0 % (ref 0–5)
LACTATE BLDV-SCNC: 1.2 MMOL/L (ref 0.5–2.2)
LACTATE BLDV-SCNC: 2.9 MMOL/L (ref 0.5–2.2)
LIPASE SERPL-CCNC: 27 U/L (ref 13–60)
LYMPHOCYTES NFR BLD: 1.49 K/UL (ref 1.5–4)
LYMPHOCYTES RELATIVE PERCENT: 16 % (ref 20–42)
MCH RBC QN AUTO: 31.2 PG (ref 26–35)
MCHC RBC AUTO-ENTMCNC: 33.2 G/DL (ref 32–34.5)
MCV RBC AUTO: 94 FL (ref 80–99.9)
MONOCYTES NFR BLD: 0.46 K/UL (ref 0.1–0.95)
MONOCYTES NFR BLD: 5 % (ref 2–12)
NEUTROPHILS NFR BLD: 77 % (ref 43–80)
NEUTS SEG NFR BLD: 7.03 K/UL (ref 1.8–7.3)
PLATELET # BLD AUTO: 255 K/UL (ref 130–450)
PMV BLD AUTO: 9.8 FL (ref 7–12)
POTASSIUM SERPL-SCNC: 4.3 MMOL/L (ref 3.5–5.1)
PROT SERPL-MCNC: 7.2 G/DL (ref 6.4–8.3)
RBC # BLD AUTO: 4.87 M/UL (ref 3.5–5.5)
SODIUM SERPL-SCNC: 140 MMOL/L (ref 136–145)
TROPONIN I SERPL HS-MCNC: <6 NG/L (ref 0–14)
WBC OTHER # BLD: 9.1 K/UL (ref 4.5–11.5)

## 2025-06-07 PROCEDURE — 99285 EMERGENCY DEPT VISIT HI MDM: CPT

## 2025-06-07 PROCEDURE — 80053 COMPREHEN METABOLIC PANEL: CPT

## 2025-06-07 PROCEDURE — 93005 ELECTROCARDIOGRAM TRACING: CPT | Performed by: EMERGENCY MEDICINE

## 2025-06-07 PROCEDURE — 83605 ASSAY OF LACTIC ACID: CPT

## 2025-06-07 PROCEDURE — 83690 ASSAY OF LIPASE: CPT

## 2025-06-07 PROCEDURE — 96372 THER/PROPH/DIAG INJ SC/IM: CPT

## 2025-06-07 PROCEDURE — 6360000004 HC RX CONTRAST MEDICATION: Performed by: RADIOLOGY

## 2025-06-07 PROCEDURE — 74177 CT ABD & PELVIS W/CONTRAST: CPT

## 2025-06-07 PROCEDURE — 2580000003 HC RX 258: Performed by: EMERGENCY MEDICINE

## 2025-06-07 PROCEDURE — 96374 THER/PROPH/DIAG INJ IV PUSH: CPT

## 2025-06-07 PROCEDURE — 84484 ASSAY OF TROPONIN QUANT: CPT

## 2025-06-07 PROCEDURE — 85025 COMPLETE CBC W/AUTO DIFF WBC: CPT

## 2025-06-07 PROCEDURE — 6360000002 HC RX W HCPCS: Performed by: EMERGENCY MEDICINE

## 2025-06-07 RX ORDER — IOPAMIDOL 755 MG/ML
18 INJECTION, SOLUTION INTRAVASCULAR
Status: COMPLETED | OUTPATIENT
Start: 2025-06-07 | End: 2025-06-07

## 2025-06-07 RX ORDER — IOPAMIDOL 755 MG/ML
75 INJECTION, SOLUTION INTRAVASCULAR
Status: COMPLETED | OUTPATIENT
Start: 2025-06-07 | End: 2025-06-07

## 2025-06-07 RX ORDER — ONDANSETRON 2 MG/ML
4 INJECTION INTRAMUSCULAR; INTRAVENOUS ONCE
Status: COMPLETED | OUTPATIENT
Start: 2025-06-07 | End: 2025-06-07

## 2025-06-07 RX ORDER — FENTANYL CITRATE 50 UG/ML
50 INJECTION, SOLUTION INTRAMUSCULAR; INTRAVENOUS ONCE
Status: COMPLETED | OUTPATIENT
Start: 2025-06-07 | End: 2025-06-07

## 2025-06-07 RX ORDER — 0.9 % SODIUM CHLORIDE 0.9 %
1000 INTRAVENOUS SOLUTION INTRAVENOUS ONCE
Status: COMPLETED | OUTPATIENT
Start: 2025-06-07 | End: 2025-06-07

## 2025-06-07 RX ADMIN — IOPAMIDOL 18 ML: 755 INJECTION, SOLUTION INTRAVENOUS at 20:56

## 2025-06-07 RX ADMIN — ONDANSETRON HYDROCHLORIDE 4 MG: 2 INJECTION, SOLUTION INTRAMUSCULAR; INTRAVENOUS at 18:48

## 2025-06-07 RX ADMIN — FENTANYL CITRATE 50 MCG: 50 INJECTION INTRAMUSCULAR; INTRAVENOUS at 18:54

## 2025-06-07 RX ADMIN — IOPAMIDOL 75 ML: 755 INJECTION, SOLUTION INTRAVENOUS at 20:56

## 2025-06-07 RX ADMIN — SODIUM CHLORIDE 1000 ML: 9 INJECTION, SOLUTION INTRAVENOUS at 18:47

## 2025-06-07 ASSESSMENT — PAIN SCALES - GENERAL: PAINLEVEL_OUTOF10: 7

## 2025-06-07 ASSESSMENT — PAIN DESCRIPTION - ORIENTATION: ORIENTATION: MID

## 2025-06-07 ASSESSMENT — PAIN DESCRIPTION - LOCATION: LOCATION: BACK;ABDOMEN

## 2025-06-07 NOTE — ED PROVIDER NOTES
abdomen        DISPOSITION  Disposition: Patient signed out to oncoming physician pending CT and final disposition  Patient condition is stable      NOTE: This report was transcribed using voice recognition software. Every effort was made to ensure accuracy; however, inadvertent computerized transcription errors may be present    Hanna COOK MD, am the primary provider of this record        Hanna Atkinson MD  06/07/25 9654

## 2025-06-07 NOTE — ED NOTES
Radiology Procedure Waiver   Name: Trinidad Weinberg  : 1975  MRN: [id number]    Date:  25    Time: 6:23 PM EDT    Benefits of immediately proceeding with Radiology exam(s) without pre-testing outweigh the risks or are not indicated as specified below and therefore the following is/are being waived:    [] Pregnancy test   [] Patients LMP on-time and regular.   [] Patient had Tubal Ligation or has other Contraception Device.   [] Patient  is Menopausal or Premenarcheal.    [] Patient had Full or Partial Hysterectomy.    [] Protocol for Iodine allergy    [] MRI Questionnaire     [x] BUN/Creatinine   [] Patient age w/no hx of renal dysfunction.   [] Patient on Dialysis.   [] Recent Normal Labs.  Electronically signed by Hanna Atkinson MD on 25 at 6:23 PM EDT          Emergent      Hanna Atkinson MD  [phone]

## 2025-06-08 ENCOUNTER — HOSPITAL ENCOUNTER (EMERGENCY)
Age: 50
Discharge: HOME | End: 2025-06-08
Attending: EMERGENCY MEDICINE
Payer: OTHER GOVERNMENT

## 2025-06-08 VITALS
DIASTOLIC BLOOD PRESSURE: 79 MMHG | OXYGEN SATURATION: 97 % | SYSTOLIC BLOOD PRESSURE: 148 MMHG | RESPIRATION RATE: 20 BRPM | HEART RATE: 73 BPM | TEMPERATURE: 98.9 F

## 2025-06-08 DIAGNOSIS — R10.10 PAIN OF UPPER ABDOMEN: Primary | ICD-10-CM

## 2025-06-08 NOTE — ED NOTES
Patient was seen by prior ED physician.  Patient has prior history of gastric bypass.  Patient reporting abdominal pain this around 4:00 this afternoon she reports nausea vomit she reports she did have bowel movement yesterday she did not have a bowel movement today.  Patient reporting she did pass gas earlier this morning but has not since her episode of vomiting.  She reports no chest pain or difficulty breathing she was medicated IV fluids as well as given fentanyl here in the emergency room.  Patient was examined by me after CT results.  She is having very minimal pain on exam.  She is in no distress.  Patient CT ab pelvis shows no signs of obstruction I did speak to [facility] Dr. Noah Salmon discussed findings on CT.  Patient plan will be to discharge home.       I did reassess patient while after 1:35 AM.  Patient in no distress was able to drink water here.  Patient made aware of need for follow-up and need to return if symptoms worsen or persist.  Swapnil Poon MD  06/08/25 0052       Swapnil Poon MD  06/08/25 0141

## 2025-06-08 NOTE — ED NOTES
Vital signs rechecked and pt given discharge instructions with voiced understanding for follow-up.

## 2025-06-09 LAB
EKG ATRIAL RATE: 64 BPM
EKG P AXIS: 50 DEGREES
EKG P-R INTERVAL: 146 MS
EKG Q-T INTERVAL: 430 MS
EKG QRS DURATION: 86 MS
EKG QTC CALCULATION (BAZETT): 443 MS
EKG R AXIS: -11 DEGREES
EKG T AXIS: 5 DEGREES
EKG VENTRICULAR RATE: 64 BPM

## 2025-06-09 PROCEDURE — 93010 ELECTROCARDIOGRAM REPORT: CPT | Performed by: INTERNAL MEDICINE

## 2025-06-17 ENCOUNTER — OFFICE VISIT (OUTPATIENT)
Dept: FAMILY MEDICINE CLINIC | Age: 50
End: 2025-06-17
Payer: OTHER GOVERNMENT

## 2025-06-17 VITALS
WEIGHT: 244 LBS | RESPIRATION RATE: 18 BRPM | DIASTOLIC BLOOD PRESSURE: 80 MMHG | BODY MASS INDEX: 47.91 KG/M2 | OXYGEN SATURATION: 97 % | TEMPERATURE: 96.8 F | HEIGHT: 60 IN | HEART RATE: 64 BPM | SYSTOLIC BLOOD PRESSURE: 123 MMHG

## 2025-06-17 DIAGNOSIS — R63.5 WEIGHT GAIN: ICD-10-CM

## 2025-06-17 DIAGNOSIS — R00.1 BRADYCARDIA: ICD-10-CM

## 2025-06-17 DIAGNOSIS — Z98.84 S/P GASTRIC BYPASS: ICD-10-CM

## 2025-06-17 DIAGNOSIS — K56.609 SMALL BOWEL OBSTRUCTION (HCC): Primary | ICD-10-CM

## 2025-06-17 PROCEDURE — G2211 COMPLEX E/M VISIT ADD ON: HCPCS | Performed by: STUDENT IN AN ORGANIZED HEALTH CARE EDUCATION/TRAINING PROGRAM

## 2025-06-17 PROCEDURE — 99214 OFFICE O/P EST MOD 30 MIN: CPT | Performed by: STUDENT IN AN ORGANIZED HEALTH CARE EDUCATION/TRAINING PROGRAM

## 2025-06-17 PROCEDURE — 3079F DIAST BP 80-89 MM HG: CPT | Performed by: STUDENT IN AN ORGANIZED HEALTH CARE EDUCATION/TRAINING PROGRAM

## 2025-06-17 PROCEDURE — 3074F SYST BP LT 130 MM HG: CPT | Performed by: STUDENT IN AN ORGANIZED HEALTH CARE EDUCATION/TRAINING PROGRAM

## 2025-06-17 RX ORDER — POLYETHYLENE GLYCOL 3350 17 G/17G
17 POWDER, FOR SOLUTION ORAL DAILY
COMMUNITY
Start: 2025-06-14 | End: 2025-06-21

## 2025-06-17 RX ORDER — PANTOPRAZOLE SODIUM 40 MG/1
40 TABLET, DELAYED RELEASE ORAL DAILY
Status: ON HOLD | COMMUNITY
Start: 2025-06-08

## 2025-06-17 RX ORDER — METOCLOPRAMIDE 5 MG/1
5 TABLET ORAL DAILY PRN
Status: ON HOLD | COMMUNITY
Start: 2025-06-08 | End: 2025-06-22 | Stop reason: ALTCHOICE

## 2025-06-17 SDOH — ECONOMIC STABILITY: FOOD INSECURITY: WITHIN THE PAST 12 MONTHS, THE FOOD YOU BOUGHT JUST DIDN'T LAST AND YOU DIDN'T HAVE MONEY TO GET MORE.: NEVER TRUE

## 2025-06-17 SDOH — ECONOMIC STABILITY: FOOD INSECURITY: WITHIN THE PAST 12 MONTHS, YOU WORRIED THAT YOUR FOOD WOULD RUN OUT BEFORE YOU GOT MONEY TO BUY MORE.: NEVER TRUE

## 2025-06-17 NOTE — PROGRESS NOTES
Patient:  Trinidad Weinberg 50 y.o. female     06/17/25  Chiefcomplaint:   Chief Complaint   Patient presents with    Follow-Up from Hospital     06/11/25 Small bowel obstruction     History of Present Illness  The patient presents for evaluation of bowel obstruction, weight gain, and heart rate management.    She reports a persistent feeling of weakness, which she attributes to her recent health issues. She was at [facility] on 06/13/2025, where she waited from 5:30 PM to 2:00 AM without seeing a doctor. A CT scan was ordered, and she was eventually discharged despite still being in the waiting room and vomiting. She continued to vomit on 06/14/2025 and went to [facility], where she was diagnosed with gastroparesis and prescribed Reglan. Despite this, her vomiting persisted, culminating in 48 episodes on 06/10/2025, including one instance of coffee ground emesis. This led to her transfer to Main Surgery at [facility], where she underwent surgery for an internal hernia with bowel obstruction on 06/11/2025. She was informed that the NG tube insertion was not recommended due to her bariatric status, but it was necessary to prevent aspiration during surgery. Post-surgery, she experienced symptoms suggestive of bronchitis or pneumonia and was placed on a heart monitor due to concerns about her metoprolol dosage.    She continues to experience abdominal pain at the surgical sites but reports no new nausea, vomiting, bleeding, or dark stools. She had her first normal bowel movement today, following two small ones yesterday. She is scheduled for a follow-up with the surgeon on 07/18/2025. She has gained weight since discontinuing Trulicity, which was previously suspected to have caused an obstruction. She is adhering to a strict diet but continues to gain weight. She is considering increasing her Colace dosage to two tablets daily and is seeking advice on whether to take them both in the

## 2025-06-22 ENCOUNTER — HOSPITAL ENCOUNTER (EMERGENCY)
Age: 50
Discharge: ANOTHER HOSPITAL | End: 2025-06-22
Attending: STUDENT IN AN ORGANIZED HEALTH CARE EDUCATION/TRAINING PROGRAM
Payer: OTHER GOVERNMENT

## 2025-06-22 ENCOUNTER — HOSPITAL ENCOUNTER (OUTPATIENT)
Age: 50
Setting detail: OBSERVATION
Discharge: HOME | End: 2025-06-23
Attending: STUDENT IN AN ORGANIZED HEALTH CARE EDUCATION/TRAINING PROGRAM | Admitting: STUDENT IN AN ORGANIZED HEALTH CARE EDUCATION/TRAINING PROGRAM
Payer: OTHER GOVERNMENT

## 2025-06-22 ENCOUNTER — APPOINTMENT (OUTPATIENT)
Dept: GENERAL RADIOLOGY | Age: 50
End: 2025-06-22
Payer: OTHER GOVERNMENT

## 2025-06-22 ENCOUNTER — APPOINTMENT (OUTPATIENT)
Dept: CT IMAGING | Age: 50
End: 2025-06-22
Payer: OTHER GOVERNMENT

## 2025-06-22 VITALS
DIASTOLIC BLOOD PRESSURE: 115 MMHG | HEART RATE: 65 BPM | OXYGEN SATURATION: 95 % | TEMPERATURE: 98.1 F | BODY MASS INDEX: 47.66 KG/M2 | WEIGHT: 244.05 LBS | RESPIRATION RATE: 20 BRPM | SYSTOLIC BLOOD PRESSURE: 149 MMHG

## 2025-06-22 DIAGNOSIS — R07.9 CHEST PAIN, UNSPECIFIED TYPE: ICD-10-CM

## 2025-06-22 DIAGNOSIS — M54.6 ACUTE RIGHT-SIDED THORACIC BACK PAIN: ICD-10-CM

## 2025-06-22 DIAGNOSIS — I26.93 SINGLE SUBSEGMENTAL PULMONARY EMBOLISM WITHOUT ACUTE COR PULMONALE (HCC): Primary | ICD-10-CM

## 2025-06-22 DIAGNOSIS — I26.99 ACUTE PULMONARY EMBOLISM WITHOUT ACUTE COR PULMONALE, UNSPECIFIED PULMONARY EMBOLISM TYPE (HCC): Primary | ICD-10-CM

## 2025-06-22 LAB
ALBUMIN SERPL-MCNC: 3.8 G/DL (ref 3.5–5.2)
ALP SERPL-CCNC: 81 U/L (ref 35–104)
ALT SERPL-CCNC: 21 U/L (ref 0–35)
ANION GAP SERPL CALCULATED.3IONS-SCNC: 9 MMOL/L (ref 7–16)
AST SERPL-CCNC: 20 U/L (ref 0–35)
BASOPHILS # BLD: 0.03 K/UL (ref 0–0.2)
BASOPHILS NFR BLD: 1 % (ref 0–2)
BILIRUB SERPL-MCNC: 0.4 MG/DL (ref 0–1.2)
BNP SERPL-MCNC: 117 PG/ML (ref 0–125)
BUN SERPL-MCNC: 17 MG/DL (ref 6–20)
CALCIUM SERPL-MCNC: 8.8 MG/DL (ref 8.6–10)
CHLORIDE SERPL-SCNC: 108 MMOL/L (ref 98–107)
CO2 SERPL-SCNC: 25 MMOL/L (ref 22–29)
CREAT SERPL-MCNC: 0.8 MG/DL (ref 0.5–1)
EKG ATRIAL RATE: 57 BPM
EKG P AXIS: 61 DEGREES
EKG P-R INTERVAL: 160 MS
EKG Q-T INTERVAL: 448 MS
EKG QRS DURATION: 100 MS
EKG QTC CALCULATION (BAZETT): 436 MS
EKG R AXIS: 1 DEGREES
EKG T AXIS: 7 DEGREES
EKG VENTRICULAR RATE: 57 BPM
EOSINOPHIL # BLD: 0.1 K/UL (ref 0.05–0.5)
EOSINOPHILS RELATIVE PERCENT: 2 % (ref 0–6)
ERYTHROCYTE [DISTWIDTH] IN BLOOD BY AUTOMATED COUNT: 13.2 % (ref 11.5–15)
ERYTHROCYTE [DISTWIDTH] IN BLOOD BY AUTOMATED COUNT: 13.3 % (ref 11.5–15)
GFR, ESTIMATED: 85 ML/MIN/1.73M2
GLUCOSE SERPL-MCNC: 95 MG/DL (ref 74–99)
HCT VFR BLD AUTO: 38.5 % (ref 34–48)
HCT VFR BLD AUTO: 39.4 % (ref 34–48)
HGB BLD-MCNC: 13 G/DL (ref 11.5–15.5)
HGB BLD-MCNC: 13.2 G/DL (ref 11.5–15.5)
IMM GRANULOCYTES # BLD AUTO: 0.05 K/UL (ref 0–0.58)
IMM GRANULOCYTES NFR BLD: 1 % (ref 0–5)
LYMPHOCYTES NFR BLD: 2.03 K/UL (ref 1.5–4)
LYMPHOCYTES RELATIVE PERCENT: 31 % (ref 20–42)
MCH RBC QN AUTO: 30.8 PG (ref 26–35)
MCH RBC QN AUTO: 31.4 PG (ref 26–35)
MCHC RBC AUTO-ENTMCNC: 33.5 G/DL (ref 32–34.5)
MCHC RBC AUTO-ENTMCNC: 33.8 G/DL (ref 32–34.5)
MCV RBC AUTO: 92.1 FL (ref 80–99.9)
MCV RBC AUTO: 93 FL (ref 80–99.9)
MONOCYTES NFR BLD: 0.47 K/UL (ref 0.1–0.95)
MONOCYTES NFR BLD: 7 % (ref 2–12)
NEUTROPHILS NFR BLD: 59 % (ref 43–80)
NEUTS SEG NFR BLD: 3.82 K/UL (ref 1.8–7.3)
PARTIAL THROMBOPLASTIN TIME: 32.1 SEC (ref 24.5–35.1)
PLATELET # BLD AUTO: 241 K/UL (ref 130–450)
PLATELET # BLD AUTO: 250 K/UL (ref 130–450)
PMV BLD AUTO: 9.5 FL (ref 7–12)
PMV BLD AUTO: 9.6 FL (ref 7–12)
POTASSIUM SERPL-SCNC: 4.8 MMOL/L (ref 3.5–5.1)
PROT SERPL-MCNC: 6.2 G/DL (ref 6.4–8.3)
RBC # BLD AUTO: 4.14 M/UL (ref 3.5–5.5)
RBC # BLD AUTO: 4.28 M/UL (ref 3.5–5.5)
SODIUM SERPL-SCNC: 143 MMOL/L (ref 136–145)
TROPONIN I SERPL HS-MCNC: <6 NG/L (ref 0–14)
TROPONIN I SERPL HS-MCNC: <6 NG/L (ref 0–14)
WBC OTHER # BLD: 6.2 K/UL (ref 4.5–11.5)
WBC OTHER # BLD: 6.5 K/UL (ref 4.5–11.5)

## 2025-06-22 PROCEDURE — 6360000004 HC RX CONTRAST MEDICATION: Performed by: RADIOLOGY

## 2025-06-22 PROCEDURE — 2500000003 HC RX 250 WO HCPCS: Performed by: STUDENT IN AN ORGANIZED HEALTH CARE EDUCATION/TRAINING PROGRAM

## 2025-06-22 PROCEDURE — 6370000000 HC RX 637 (ALT 250 FOR IP): Performed by: STUDENT IN AN ORGANIZED HEALTH CARE EDUCATION/TRAINING PROGRAM

## 2025-06-22 PROCEDURE — G0379 DIRECT REFER HOSPITAL OBSERV: HCPCS

## 2025-06-22 PROCEDURE — 6360000002 HC RX W HCPCS: Performed by: STUDENT IN AN ORGANIZED HEALTH CARE EDUCATION/TRAINING PROGRAM

## 2025-06-22 PROCEDURE — 71045 X-RAY EXAM CHEST 1 VIEW: CPT

## 2025-06-22 PROCEDURE — 93010 ELECTROCARDIOGRAM REPORT: CPT | Performed by: INTERNAL MEDICINE

## 2025-06-22 PROCEDURE — 85027 COMPLETE CBC AUTOMATED: CPT

## 2025-06-22 PROCEDURE — 99223 1ST HOSP IP/OBS HIGH 75: CPT | Performed by: STUDENT IN AN ORGANIZED HEALTH CARE EDUCATION/TRAINING PROGRAM

## 2025-06-22 PROCEDURE — 96372 THER/PROPH/DIAG INJ SC/IM: CPT

## 2025-06-22 PROCEDURE — 84484 ASSAY OF TROPONIN QUANT: CPT

## 2025-06-22 PROCEDURE — 96375 TX/PRO/DX INJ NEW DRUG ADDON: CPT

## 2025-06-22 PROCEDURE — 71275 CT ANGIOGRAPHY CHEST: CPT

## 2025-06-22 PROCEDURE — 93005 ELECTROCARDIOGRAM TRACING: CPT | Performed by: STUDENT IN AN ORGANIZED HEALTH CARE EDUCATION/TRAINING PROGRAM

## 2025-06-22 PROCEDURE — 96365 THER/PROPH/DIAG IV INF INIT: CPT

## 2025-06-22 PROCEDURE — 80053 COMPREHEN METABOLIC PANEL: CPT

## 2025-06-22 PROCEDURE — 96366 THER/PROPH/DIAG IV INF ADDON: CPT

## 2025-06-22 PROCEDURE — 83880 ASSAY OF NATRIURETIC PEPTIDE: CPT

## 2025-06-22 PROCEDURE — 85730 THROMBOPLASTIN TIME PARTIAL: CPT

## 2025-06-22 PROCEDURE — 99285 EMERGENCY DEPT VISIT HI MDM: CPT

## 2025-06-22 PROCEDURE — G0378 HOSPITAL OBSERVATION PER HR: HCPCS

## 2025-06-22 PROCEDURE — 85025 COMPLETE CBC W/AUTO DIFF WBC: CPT

## 2025-06-22 RX ORDER — ACETAMINOPHEN 650 MG/1
650 SUPPOSITORY RECTAL EVERY 6 HOURS PRN
Status: DISCONTINUED | OUTPATIENT
Start: 2025-06-22 | End: 2025-06-23 | Stop reason: HOSPADM

## 2025-06-22 RX ORDER — HEPARIN SODIUM 1000 [USP'U]/ML
80 INJECTION, SOLUTION INTRAVENOUS; SUBCUTANEOUS ONCE
Status: DISCONTINUED | OUTPATIENT
Start: 2025-06-22 | End: 2025-06-22 | Stop reason: SDUPTHER

## 2025-06-22 RX ORDER — ASPIRIN 81 MG/1
81 TABLET ORAL DAILY
Status: DISCONTINUED | OUTPATIENT
Start: 2025-06-23 | End: 2025-06-23 | Stop reason: HOSPADM

## 2025-06-22 RX ORDER — HEPARIN SODIUM 1000 [USP'U]/ML
80 INJECTION, SOLUTION INTRAVENOUS; SUBCUTANEOUS PRN
Status: DISCONTINUED | OUTPATIENT
Start: 2025-06-22 | End: 2025-06-22 | Stop reason: SDUPTHER

## 2025-06-22 RX ORDER — HEPARIN SODIUM 1000 [USP'U]/ML
40 INJECTION, SOLUTION INTRAVENOUS; SUBCUTANEOUS PRN
Status: DISCONTINUED | OUTPATIENT
Start: 2025-06-22 | End: 2025-06-22 | Stop reason: SDUPTHER

## 2025-06-22 RX ORDER — HEPARIN SODIUM 1000 [USP'U]/ML
80 INJECTION, SOLUTION INTRAVENOUS; SUBCUTANEOUS ONCE
Status: COMPLETED | OUTPATIENT
Start: 2025-06-22 | End: 2025-06-22

## 2025-06-22 RX ORDER — SODIUM CHLORIDE 0.9 % (FLUSH) 0.9 %
5-40 SYRINGE (ML) INJECTION PRN
Status: DISCONTINUED | OUTPATIENT
Start: 2025-06-22 | End: 2025-06-23 | Stop reason: HOSPADM

## 2025-06-22 RX ORDER — OXYCODONE AND ACETAMINOPHEN 5; 325 MG/1; MG/1
1 TABLET ORAL EVERY 4 HOURS PRN
Refills: 0 | Status: DISCONTINUED | OUTPATIENT
Start: 2025-06-22 | End: 2025-06-23 | Stop reason: HOSPADM

## 2025-06-22 RX ORDER — LOSARTAN POTASSIUM 50 MG/1
100 TABLET ORAL DAILY
Status: DISCONTINUED | OUTPATIENT
Start: 2025-06-23 | End: 2025-06-23 | Stop reason: HOSPADM

## 2025-06-22 RX ORDER — MAGNESIUM SULFATE IN WATER 40 MG/ML
2000 INJECTION, SOLUTION INTRAVENOUS PRN
Status: DISCONTINUED | OUTPATIENT
Start: 2025-06-22 | End: 2025-06-23 | Stop reason: HOSPADM

## 2025-06-22 RX ORDER — PANTOPRAZOLE SODIUM 40 MG/1
40 TABLET, DELAYED RELEASE ORAL
Status: DISCONTINUED | OUTPATIENT
Start: 2025-06-23 | End: 2025-06-23 | Stop reason: HOSPADM

## 2025-06-22 RX ORDER — HEPARIN SODIUM 10000 [USP'U]/100ML
5-30 INJECTION, SOLUTION INTRAVENOUS CONTINUOUS
Status: DISCONTINUED | OUTPATIENT
Start: 2025-06-22 | End: 2025-06-22 | Stop reason: SDUPTHER

## 2025-06-22 RX ORDER — ENOXAPARIN SODIUM 150 MG/ML
1 INJECTION SUBCUTANEOUS 2 TIMES DAILY
Status: DISCONTINUED | OUTPATIENT
Start: 2025-06-22 | End: 2025-06-23

## 2025-06-22 RX ORDER — SUCRALFATE 1 G/1
1 TABLET ORAL ONCE
Status: COMPLETED | OUTPATIENT
Start: 2025-06-22 | End: 2025-06-22

## 2025-06-22 RX ORDER — SODIUM CHLORIDE 0.9 % (FLUSH) 0.9 %
5-40 SYRINGE (ML) INJECTION EVERY 12 HOURS SCHEDULED
Status: DISCONTINUED | OUTPATIENT
Start: 2025-06-22 | End: 2025-06-23 | Stop reason: HOSPADM

## 2025-06-22 RX ORDER — ORPHENADRINE CITRATE 30 MG/ML
60 INJECTION INTRAMUSCULAR; INTRAVENOUS ONCE
Status: COMPLETED | OUTPATIENT
Start: 2025-06-22 | End: 2025-06-22

## 2025-06-22 RX ORDER — ONDANSETRON 2 MG/ML
4 INJECTION INTRAMUSCULAR; INTRAVENOUS EVERY 6 HOURS PRN
Status: DISCONTINUED | OUTPATIENT
Start: 2025-06-22 | End: 2025-06-23 | Stop reason: HOSPADM

## 2025-06-22 RX ORDER — FENTANYL CITRATE 50 UG/ML
25 INJECTION, SOLUTION INTRAMUSCULAR; INTRAVENOUS ONCE
Refills: 0 | Status: COMPLETED | OUTPATIENT
Start: 2025-06-22 | End: 2025-06-22

## 2025-06-22 RX ORDER — ROSUVASTATIN CALCIUM 10 MG/1
10 TABLET, COATED ORAL NIGHTLY
Status: DISCONTINUED | OUTPATIENT
Start: 2025-06-22 | End: 2025-06-23 | Stop reason: HOSPADM

## 2025-06-22 RX ORDER — SODIUM CHLORIDE 9 MG/ML
INJECTION, SOLUTION INTRAVENOUS PRN
Status: DISCONTINUED | OUTPATIENT
Start: 2025-06-22 | End: 2025-06-23

## 2025-06-22 RX ORDER — HEPARIN SODIUM 10000 [USP'U]/100ML
5-30 INJECTION, SOLUTION INTRAVENOUS CONTINUOUS
Status: DISCONTINUED | OUTPATIENT
Start: 2025-06-22 | End: 2025-06-22 | Stop reason: HOSPADM

## 2025-06-22 RX ORDER — LEVOTHYROXINE SODIUM 75 UG/1
75 TABLET ORAL DAILY
Status: DISCONTINUED | OUTPATIENT
Start: 2025-06-23 | End: 2025-06-23 | Stop reason: HOSPADM

## 2025-06-22 RX ORDER — ONDANSETRON 4 MG/1
4 TABLET, FILM COATED ORAL PRN
COMMUNITY

## 2025-06-22 RX ORDER — HEPARIN SODIUM 1000 [USP'U]/ML
80 INJECTION, SOLUTION INTRAVENOUS; SUBCUTANEOUS PRN
Status: DISCONTINUED | OUTPATIENT
Start: 2025-06-22 | End: 2025-06-22 | Stop reason: HOSPADM

## 2025-06-22 RX ORDER — IOPAMIDOL 755 MG/ML
75 INJECTION, SOLUTION INTRAVASCULAR
Status: COMPLETED | OUTPATIENT
Start: 2025-06-22 | End: 2025-06-22

## 2025-06-22 RX ORDER — AMLODIPINE BESYLATE 5 MG/1
5 TABLET ORAL DAILY
Status: DISCONTINUED | OUTPATIENT
Start: 2025-06-23 | End: 2025-06-23 | Stop reason: HOSPADM

## 2025-06-22 RX ORDER — POLYETHYLENE GLYCOL 3350 17 G/17G
17 POWDER, FOR SOLUTION ORAL DAILY PRN
Status: DISCONTINUED | OUTPATIENT
Start: 2025-06-22 | End: 2025-06-23 | Stop reason: HOSPADM

## 2025-06-22 RX ORDER — POTASSIUM CHLORIDE 1500 MG/1
40 TABLET, EXTENDED RELEASE ORAL PRN
Status: DISCONTINUED | OUTPATIENT
Start: 2025-06-22 | End: 2025-06-23 | Stop reason: HOSPADM

## 2025-06-22 RX ORDER — HEPARIN SODIUM 1000 [USP'U]/ML
40 INJECTION, SOLUTION INTRAVENOUS; SUBCUTANEOUS PRN
Status: DISCONTINUED | OUTPATIENT
Start: 2025-06-22 | End: 2025-06-22 | Stop reason: HOSPADM

## 2025-06-22 RX ORDER — POLYETHYLENE GLYCOL 3350 17 G/17G
17 POWDER, FOR SOLUTION ORAL DAILY
COMMUNITY

## 2025-06-22 RX ORDER — ONDANSETRON 4 MG/1
4 TABLET, ORALLY DISINTEGRATING ORAL EVERY 8 HOURS PRN
Status: DISCONTINUED | OUTPATIENT
Start: 2025-06-22 | End: 2025-06-23 | Stop reason: HOSPADM

## 2025-06-22 RX ORDER — POTASSIUM CHLORIDE 7.45 MG/ML
10 INJECTION INTRAVENOUS PRN
Status: DISCONTINUED | OUTPATIENT
Start: 2025-06-22 | End: 2025-06-23 | Stop reason: HOSPADM

## 2025-06-22 RX ORDER — ACETAMINOPHEN 325 MG/1
650 TABLET ORAL EVERY 6 HOURS PRN
Status: DISCONTINUED | OUTPATIENT
Start: 2025-06-22 | End: 2025-06-23 | Stop reason: HOSPADM

## 2025-06-22 RX ADMIN — HEPARIN SODIUM 8900 UNITS: 1000 INJECTION INTRAVENOUS; SUBCUTANEOUS at 13:03

## 2025-06-22 RX ADMIN — OXYCODONE AND ACETAMINOPHEN 1 TABLET: 5; 325 TABLET ORAL at 17:35

## 2025-06-22 RX ADMIN — ORPHENADRINE CITRATE 60 MG: 30 INJECTION, SOLUTION INTRAMUSCULAR; INTRAVENOUS at 12:25

## 2025-06-22 RX ADMIN — SODIUM CHLORIDE, PRESERVATIVE FREE 10 ML: 5 INJECTION INTRAVENOUS at 21:54

## 2025-06-22 RX ADMIN — SUCRALFATE 1 G: 1 TABLET ORAL at 10:53

## 2025-06-22 RX ADMIN — ENOXAPARIN SODIUM 105 MG: 150 INJECTION SUBCUTANEOUS at 21:52

## 2025-06-22 RX ADMIN — IOPAMIDOL 75 ML: 755 INJECTION, SOLUTION INTRAVENOUS at 11:47

## 2025-06-22 RX ADMIN — ONDANSETRON 4 MG: 4 TABLET, ORALLY DISINTEGRATING ORAL at 23:37

## 2025-06-22 RX ADMIN — LIDOCAINE HYDROCHLORIDE: 20 SOLUTION ORAL at 09:25

## 2025-06-22 RX ADMIN — FENTANYL CITRATE 25 MCG: 50 INJECTION INTRAMUSCULAR; INTRAVENOUS at 13:51

## 2025-06-22 RX ADMIN — MICONAZOLE NITRATE: 20 POWDER TOPICAL at 21:53

## 2025-06-22 RX ADMIN — Medication 9 MG: at 21:51

## 2025-06-22 RX ADMIN — HEPARIN SODIUM 18 UNITS/KG/HR: 10000 INJECTION, SOLUTION INTRAVENOUS at 13:08

## 2025-06-22 RX ADMIN — OXYCODONE AND ACETAMINOPHEN 1 TABLET: 5; 325 TABLET ORAL at 21:51

## 2025-06-22 ASSESSMENT — PAIN DESCRIPTION - LOCATION
LOCATION: BACK;CHEST
LOCATION: BACK
LOCATION: CHEST;BACK;SHOULDER
LOCATION: BACK;CHEST

## 2025-06-22 ASSESSMENT — PAIN SCALES - GENERAL
PAINLEVEL_OUTOF10: 6
PAINLEVEL_OUTOF10: 3
PAINLEVEL_OUTOF10: 8
PAINLEVEL_OUTOF10: 10
PAINLEVEL_OUTOF10: 7
PAINLEVEL_OUTOF10: 8

## 2025-06-22 ASSESSMENT — PAIN DESCRIPTION - DESCRIPTORS
DESCRIPTORS: ACHING;DISCOMFORT;DULL
DESCRIPTORS: SHARP;STABBING;DISCOMFORT
DESCRIPTORS: SHARP
DESCRIPTORS: SHARP

## 2025-06-22 ASSESSMENT — PAIN - FUNCTIONAL ASSESSMENT
PAIN_FUNCTIONAL_ASSESSMENT: ACTIVITIES ARE NOT PREVENTED
PAIN_FUNCTIONAL_ASSESSMENT: ACTIVITIES ARE NOT PREVENTED
PAIN_FUNCTIONAL_ASSESSMENT: 0-10

## 2025-06-22 ASSESSMENT — PAIN DESCRIPTION - ORIENTATION
ORIENTATION: MID;RIGHT
ORIENTATION: UPPER;RIGHT

## 2025-06-22 NOTE — PROGRESS NOTES
4 Eyes Skin Assessment     NAME:  Trinidad Weinberg  YOB: 1975  MEDICAL RECORD NUMBER:  [id number]    The patient is being assessed for  Admission    I agree that at least one RN has performed a thorough Head to Toe Skin Assessment on the patient. ALL assessment sites listed below have been assessed.      Areas assessed by both nurses:    Head, Face, Ears, Shoulders, Back, Chest, Arms, Elbows, Hands, Sacrum. Buttock, Coccyx, Ischium, Legs. Feet and Heels, and Under Medical Devices         Does the Patient have a Wound? No noted wound(s)       Joey Prevention initiated by RN: Yes  Wound Care Orders initiated by RN: No    Pressure Injury (Stage 3,4, Unstageable, DTI, NWPT, and Complex wounds) if present, place Wound referral order by RN under : No    New Ostomies, if present place, Ostomy referral order under : No     Nurse 1 eSignature: Electronically signed by Joshua Cooper RN on 6/22/25 at 5:54 PM EDT    **SHARE this note so that the co-signing nurse can place an eSignature**    Nurse 2 eSignature: {Esignature:[id number]}

## 2025-06-22 NOTE — H&P
09:15 AM    CO2 25 06/22/2025 09:15 AM    BUN 17 06/22/2025 09:15 AM    CREATININE 0.8 06/22/2025 09:15 AM    GFRAA >60 04/28/2021 09:31 AM    LABGLOM 85 06/22/2025 09:15 AM    LABGLOM >60 12/10/2023 04:16 AM    GLUCOSE 95 06/22/2025 09:15 AM    GLUCOSE 116 10/21/2011 01:10 PM    CALCIUM 8.8 06/22/2025 09:15 AM    BILITOT 0.4 06/22/2025 09:15 AM    ALKPHOS 81 06/22/2025 09:15 AM    AST 20 06/22/2025 09:15 AM    ALT 21 06/22/2025 09:15 AM     Radiology:   No orders to display     EKG: sinus gomez, TWI III/aVF/V1 w/o ST elevations, left axis, w/o evidence of acute infarct        ASSESSMENT:    Active Problems:    JOI (obstructive sleep apnea)    Acquired hypothyroidism    Fibromyalgia    Mild intermittent asthma without complication    Mixed hyperlipidemia    GERD (gastroesophageal reflux disease)    S/P gastric bypass    Essential hypertension    Diabetes mellitus without complication (HCC)    Acute pulmonary embolism without acute cor pulmonale (HCC)  Resolved Problems:    * No resolved hospital problems. *    PLAN:  Acute provoked PE, posterior RML, w/o cor pulmonale: noted on admit CTPA. Stable on RA. Trop <6 x2, , less c/f RHS, will order ECHO as inpt however could reasonably be pursued as outpt. Lovenox for now, plan Eliquis for dispo. As presumed provoked in setting of recent surgery will need 3-6mo OAC and will likely be able to discontinue OAC thereafter. Percocet for pain  Hx Chasidy-en-Y, recent SBO (6/11/2025) 2/2 internal hernia: s/p lap reduction of internal hernia 6/11 at [facility], bariatric diet  Sinus bradycardia: noted on admit EKG, stop BB  Asthma: not in acute exac, prn nebs  HTN: cont amlodipine, losartan. PRN hydral for SBP >160  Hx PreDM: resolved with gastric bypass, last four A1c values (11/2016-1/2025) 5.1>5.3>4.9>5.4  Hypothyroid: cont synthroid  GERD: cont PPI  HLD: cont statin  JOI: home BiPAP    Code Status: Full  DVT prophylaxis: Lovenox    NOTE: Portions of this report may have been

## 2025-06-22 NOTE — ED NOTES
Esthela HYDE from access line called with bed assignment. Patient going to [facility] room 427-A, report number [phone]. Esthela HYDE to setup transportation and will call back with FELY.

## 2025-06-22 NOTE — PLAN OF CARE
Problem: Chronic Conditions and Co-morbidities  Goal: Patient's chronic conditions and co-morbidity symptoms are monitored and maintained or improved  Outcome: Progressing     Problem: Discharge Planning  Goal: Discharge to home or other facility with appropriate resources  Outcome: Progressing     Problem: Skin/Tissue Integrity  Goal: Skin integrity remains intact  Outcome: Progressing     Problem: ABCDS Injury Assessment  Goal: Absence of physical injury  Outcome: Progressing     Problem: Safety - Adult  Goal: Free from fall injury  Outcome: Progressing

## 2025-06-22 NOTE — ED PROVIDER NOTES
Department of Emergency Medicine   ED  Provider Note  Admit Date/RoomTime: 6/22/2025  8:48 AM  ED Room: 07/07              [facility]     Trinidad Weinberg is a 50 y.o. female with a PMHx significant for JOI, asthma, HLD, GERD, HTN who presents for evaluation of chest pain, beginning prior to arrival. The complaint has been persistent, moderate in severity, and worsened by nothing.   The patient states that she was just discharged from the hospital one week ago after having a small bowel obstruction and hernia. Notes that since being discharged she has been having pain in her chest which she states is sharp in nature with radiation to her back. Notes the pain is an 8 out 10. She has tried taking tylenol and tums with some improvement initially but none today. Notes this morning around 0730 she woke up from sleep with it and it has not gone away, despite medication this time. Notes no associated symptoms.  Denies any radiation or numbness or tingling     Review of Systems   Constitutional:  Negative for chills and fever.   HENT:  Negative for ear pain, sinus pressure and sore throat.    Eyes:  Negative for pain, discharge and redness.   Respiratory:  Negative for cough, shortness of breath and wheezing.    Cardiovascular:  Positive for chest pain.   Gastrointestinal:  Negative for abdominal distention, diarrhea, nausea and vomiting.   Genitourinary:  Negative for dysuria and frequency.   Musculoskeletal:  Negative for arthralgias and back pain.   Skin:  Negative for rash and wound.   Neurological:  Negative for weakness and headaches.   Hematological:  Negative for adenopathy.   All other systems reviewed and are negative.       Physical Exam  Vitals and nursing note reviewed.   Constitutional:       General: She is not in acute distress.     Appearance: Normal appearance. She is well-developed. She is not ill-appearing.   HENT:      Head: Normocephalic and atraumatic.      Right Ear: External ear normal.      Left Ear:

## 2025-06-23 ENCOUNTER — APPOINTMENT (OUTPATIENT)
Age: 50
End: 2025-06-23
Attending: STUDENT IN AN ORGANIZED HEALTH CARE EDUCATION/TRAINING PROGRAM
Payer: OTHER GOVERNMENT

## 2025-06-23 VITALS
RESPIRATION RATE: 16 BRPM | HEIGHT: 60 IN | OXYGEN SATURATION: 96 % | BODY MASS INDEX: 48 KG/M2 | TEMPERATURE: 97.7 F | HEART RATE: 67 BPM | SYSTOLIC BLOOD PRESSURE: 150 MMHG | DIASTOLIC BLOOD PRESSURE: 91 MMHG | WEIGHT: 244.49 LBS

## 2025-06-23 LAB
ANION GAP SERPL CALCULATED.3IONS-SCNC: 14 MMOL/L (ref 7–16)
BASOPHILS # BLD: 0.03 K/UL (ref 0–0.2)
BASOPHILS NFR BLD: 0 % (ref 0–2)
BUN SERPL-MCNC: 12 MG/DL (ref 6–20)
CALCIUM SERPL-MCNC: 8.8 MG/DL (ref 8.6–10.2)
CHLORIDE SERPL-SCNC: 103 MMOL/L (ref 98–107)
CO2 SERPL-SCNC: 24 MMOL/L (ref 22–29)
CREAT SERPL-MCNC: 0.7 MG/DL (ref 0.5–1)
ECHO AO ASC DIAM: 3 CM
ECHO AO ASCENDING AORTA INDEX: 1.48 CM/M2
ECHO AV AREA PEAK VELOCITY: 2.4 CM2
ECHO AV AREA VTI: 2.1 CM2
ECHO AV AREA/BSA PEAK VELOCITY: 1.2 CM2/M2
ECHO AV AREA/BSA VTI: 1 CM2/M2
ECHO AV CUSP MM: 2.1 CM
ECHO AV MEAN GRADIENT: 9 MMHG
ECHO AV MEAN VELOCITY: 1.4 M/S
ECHO AV PEAK GRADIENT: 15 MMHG
ECHO AV PEAK VELOCITY: 1.9 M/S
ECHO AV VELOCITY RATIO: 0.74
ECHO AV VTI: 50.7 CM
ECHO BSA: 2.16 M2
ECHO EST RA PRESSURE: 3 MMHG
ECHO LA DIAMETER INDEX: 1.92 CM/M2
ECHO LA DIAMETER: 3.9 CM
ECHO LA VOL A-L A2C: 86 ML (ref 22–52)
ECHO LA VOL A-L A4C: 80 ML (ref 22–52)
ECHO LA VOL MOD A2C: 86 ML (ref 22–52)
ECHO LA VOL MOD A4C: 75 ML (ref 22–52)
ECHO LA VOLUME AREA LENGTH: 83 ML
ECHO LA VOLUME INDEX A-L A2C: 42 ML/M2 (ref 16–34)
ECHO LA VOLUME INDEX A-L A4C: 39 ML/M2 (ref 16–34)
ECHO LA VOLUME INDEX AREA LENGTH: 41 ML/M2 (ref 16–34)
ECHO LA VOLUME INDEX MOD A2C: 42 ML/M2 (ref 16–34)
ECHO LA VOLUME INDEX MOD A4C: 37 ML/M2 (ref 16–34)
ECHO LV E' LATERAL VELOCITY: 9 CM/S
ECHO LV E' SEPTAL VELOCITY: 10 CM/S
ECHO LV EF PHYSICIAN: 55 %
ECHO LV FRACTIONAL SHORTENING: 37 % (ref 28–44)
ECHO LV INTERNAL DIMENSION DIASTOLE INDEX: 2.51 CM/M2
ECHO LV INTERNAL DIMENSION DIASTOLIC: 5.1 CM (ref 3.9–5.3)
ECHO LV INTERNAL DIMENSION SYSTOLIC INDEX: 1.58 CM/M2
ECHO LV INTERNAL DIMENSION SYSTOLIC: 3.2 CM
ECHO LV ISOVOLUMETRIC RELAXATION TIME (IVRT): 138.4 MS
ECHO LV IVSD: 1.1 CM (ref 0.6–0.9)
ECHO LV IVSS: 1.4 CM
ECHO LV MASS 2D: 200.8 G (ref 67–162)
ECHO LV MASS INDEX 2D: 98.9 G/M2 (ref 43–95)
ECHO LV POSTERIOR WALL DIASTOLIC: 1 CM (ref 0.6–0.9)
ECHO LV POSTERIOR WALL SYSTOLIC: 1.5 CM
ECHO LV RELATIVE WALL THICKNESS RATIO: 0.39
ECHO LVOT AREA: 3.1 CM2
ECHO LVOT AV VTI INDEX: 0.63
ECHO LVOT DIAM: 2 CM
ECHO LVOT MEAN GRADIENT: 4 MMHG
ECHO LVOT PEAK GRADIENT: 8 MMHG
ECHO LVOT PEAK VELOCITY: 1.4 M/S
ECHO LVOT STROKE VOLUME INDEX: 49.7 ML/M2
ECHO LVOT SV: 100.8 ML
ECHO LVOT VTI: 32.1 CM
ECHO MV "A" WAVE DURATION: 143 MSEC
ECHO MV A VELOCITY: 0.8 M/S
ECHO MV AREA PHT: 2.5 CM2
ECHO MV AREA VTI: 2.5 CM2
ECHO MV E DECELERATION TIME (DT): 236.3 MS
ECHO MV E VELOCITY: 1.01 M/S
ECHO MV E/A RATIO: 1.26
ECHO MV E/E' LATERAL: 11.22
ECHO MV E/E' RATIO (AVERAGED): 10.66
ECHO MV E/E' SEPTAL: 10.1
ECHO MV LVOT VTI INDEX: 1.24
ECHO MV MAX VELOCITY: 1 M/S
ECHO MV MEAN GRADIENT: 1 MMHG
ECHO MV MEAN VELOCITY: 0.5 M/S
ECHO MV PEAK GRADIENT: 4 MMHG
ECHO MV PRESSURE HALF TIME (PHT): 87.1 MS
ECHO MV VTI: 39.8 CM
ECHO PULMONARY ARTERY SYSTOLIC PRESSURE (PASP): 29 MMHG
ECHO PV MAX VELOCITY: 1 M/S
ECHO PV MEAN GRADIENT: 2 MMHG
ECHO PV MEAN VELOCITY: 0.7 M/S
ECHO PV PEAK GRADIENT: 4 MMHG
ECHO PV VTI: 18.4 CM
ECHO PVEIN A DURATION: 133.8 MS
ECHO PVEIN A VELOCITY: 0.3 M/S
ECHO PVEIN PEAK D VELOCITY: 0.5 M/S
ECHO PVEIN PEAK S VELOCITY: 0.7 M/S
ECHO PVEIN S/D RATIO: 1.4
ECHO RIGHT VENTRICULAR SYSTOLIC PRESSURE (RVSP): 29 MMHG
ECHO RV INTERNAL DIMENSION: 3.1 CM
ECHO RV TAPSE: 2.9 CM (ref 1.7–?)
ECHO TV REGURGITANT MAX VELOCITY: 2.53 M/S
ECHO TV REGURGITANT PEAK GRADIENT: 26 MMHG
EOSINOPHIL # BLD: 0.08 K/UL (ref 0.05–0.5)
EOSINOPHILS RELATIVE PERCENT: 1 % (ref 0–6)
ERYTHROCYTE [DISTWIDTH] IN BLOOD BY AUTOMATED COUNT: 13.2 % (ref 11.5–15)
GFR, ESTIMATED: >90 ML/MIN/1.73M2
GLUCOSE SERPL-MCNC: 107 MG/DL (ref 74–99)
HCT VFR BLD AUTO: 40.9 % (ref 34–48)
HGB BLD-MCNC: 13.5 G/DL (ref 11.5–15.5)
IMM GRANULOCYTES # BLD AUTO: 0.07 K/UL (ref 0–0.58)
IMM GRANULOCYTES NFR BLD: 1 % (ref 0–5)
LYMPHOCYTES NFR BLD: 1.8 K/UL (ref 1.5–4)
LYMPHOCYTES RELATIVE PERCENT: 23 % (ref 20–42)
MCH RBC QN AUTO: 31 PG (ref 26–35)
MCHC RBC AUTO-ENTMCNC: 33 G/DL (ref 32–34.5)
MCV RBC AUTO: 93.8 FL (ref 80–99.9)
MONOCYTES NFR BLD: 0.35 K/UL (ref 0.1–0.95)
MONOCYTES NFR BLD: 5 % (ref 2–12)
NEUTROPHILS NFR BLD: 70 % (ref 43–80)
NEUTS SEG NFR BLD: 5.48 K/UL (ref 1.8–7.3)
PLATELET # BLD AUTO: 252 K/UL (ref 130–450)
PMV BLD AUTO: 10.4 FL (ref 7–12)
POTASSIUM SERPL-SCNC: 3.9 MMOL/L (ref 3.5–5)
RBC # BLD AUTO: 4.36 M/UL (ref 3.5–5.5)
SODIUM SERPL-SCNC: 141 MMOL/L (ref 132–146)
WBC OTHER # BLD: 7.8 K/UL (ref 4.5–11.5)

## 2025-06-23 PROCEDURE — 6360000002 HC RX W HCPCS: Performed by: STUDENT IN AN ORGANIZED HEALTH CARE EDUCATION/TRAINING PROGRAM

## 2025-06-23 PROCEDURE — 93306 TTE W/DOPPLER COMPLETE: CPT | Performed by: INTERNAL MEDICINE

## 2025-06-23 PROCEDURE — 85025 COMPLETE CBC W/AUTO DIFF WBC: CPT

## 2025-06-23 PROCEDURE — G0378 HOSPITAL OBSERVATION PER HR: HCPCS

## 2025-06-23 PROCEDURE — 6370000000 HC RX 637 (ALT 250 FOR IP): Performed by: STUDENT IN AN ORGANIZED HEALTH CARE EDUCATION/TRAINING PROGRAM

## 2025-06-23 PROCEDURE — 99239 HOSP IP/OBS DSCHRG MGMT >30: CPT | Performed by: STUDENT IN AN ORGANIZED HEALTH CARE EDUCATION/TRAINING PROGRAM

## 2025-06-23 PROCEDURE — 93306 TTE W/DOPPLER COMPLETE: CPT

## 2025-06-23 PROCEDURE — 96372 THER/PROPH/DIAG INJ SC/IM: CPT

## 2025-06-23 PROCEDURE — 2500000003 HC RX 250 WO HCPCS: Performed by: STUDENT IN AN ORGANIZED HEALTH CARE EDUCATION/TRAINING PROGRAM

## 2025-06-23 PROCEDURE — 80048 BASIC METABOLIC PNL TOTAL CA: CPT

## 2025-06-23 RX ORDER — OXYCODONE AND ACETAMINOPHEN 5; 325 MG/1; MG/1
1 TABLET ORAL EVERY 6 HOURS PRN
Qty: 12 TABLET | Refills: 0 | Status: SHIPPED | OUTPATIENT
Start: 2025-06-23 | End: 2025-06-26

## 2025-06-23 RX ORDER — ENOXAPARIN SODIUM 150 MG/ML
1 INJECTION SUBCUTANEOUS DAILY
Status: COMPLETED | OUTPATIENT
Start: 2025-06-23 | End: 2025-06-23

## 2025-06-23 RX ADMIN — OXYCODONE AND ACETAMINOPHEN 1 TABLET: 5; 325 TABLET ORAL at 06:26

## 2025-06-23 RX ADMIN — ACETAMINOPHEN 650 MG: 325 TABLET ORAL at 10:23

## 2025-06-23 RX ADMIN — SODIUM CHLORIDE, PRESERVATIVE FREE 10 ML: 5 INJECTION INTRAVENOUS at 10:20

## 2025-06-23 RX ADMIN — ONDANSETRON 4 MG: 4 TABLET, ORALLY DISINTEGRATING ORAL at 10:24

## 2025-06-23 RX ADMIN — LOSARTAN POTASSIUM 100 MG: 50 TABLET, FILM COATED ORAL at 10:18

## 2025-06-23 RX ADMIN — ASPIRIN 81 MG: 81 TABLET, COATED ORAL at 10:18

## 2025-06-23 RX ADMIN — LEVOTHYROXINE SODIUM 75 MCG: 0.07 TABLET ORAL at 06:26

## 2025-06-23 RX ADMIN — PANTOPRAZOLE SODIUM 40 MG: 40 TABLET, DELAYED RELEASE ORAL at 06:26

## 2025-06-23 RX ADMIN — AMLODIPINE BESYLATE 5 MG: 5 TABLET ORAL at 10:18

## 2025-06-23 RX ADMIN — OXYCODONE AND ACETAMINOPHEN 1 TABLET: 5; 325 TABLET ORAL at 02:32

## 2025-06-23 RX ADMIN — MICONAZOLE NITRATE: 20 POWDER TOPICAL at 10:19

## 2025-06-23 RX ADMIN — ENOXAPARIN SODIUM 105 MG: 150 INJECTION SUBCUTANEOUS at 10:18

## 2025-06-23 ASSESSMENT — PAIN SCALES - GENERAL
PAINLEVEL_OUTOF10: 4
PAINLEVEL_OUTOF10: 3
PAINLEVEL_OUTOF10: 6
PAINLEVEL_OUTOF10: 5
PAINLEVEL_OUTOF10: 2

## 2025-06-23 ASSESSMENT — PAIN DESCRIPTION - LOCATION
LOCATION: SHOULDER
LOCATION: BACK
LOCATION: BACK

## 2025-06-23 ASSESSMENT — PAIN DESCRIPTION - ORIENTATION
ORIENTATION: RIGHT
ORIENTATION: LEFT;UPPER
ORIENTATION: LEFT;UPPER

## 2025-06-23 ASSESSMENT — PAIN DESCRIPTION - DESCRIPTORS: DESCRIPTORS: ACHING;SORE;TENDER

## 2025-06-23 NOTE — PLAN OF CARE
Problem: Chronic Conditions and Co-morbidities  Goal: Patient's chronic conditions and co-morbidity symptoms are monitored and maintained or improved  6/22/2025 2211 by Savanah Espinoza RN  Outcome: Progressing  6/22/2025 1825 by Joshua Cooper RN  Outcome: Progressing     Problem: Discharge Planning  Goal: Discharge to home or other facility with appropriate resources  6/22/2025 2211 by Savanah Espinoza RN  Outcome: Progressing  6/22/2025 1825 by Joshua Cooper RN  Outcome: Progressing     Problem: Skin/Tissue Integrity  Goal: Skin integrity remains intact  Description: 1.  Monitor for areas of redness and/or skin breakdown  2.  Assess vascular access sites hourly  3.  Every 4-6 hours minimum:  Change oxygen saturation probe site  4.  Every 4-6 hours:  If on nasal continuous positive airway pressure, respiratory therapy assess nares and determine need for appliance change or resting period  6/22/2025 2211 by Savanah Espinoza RN  Outcome: Progressing  6/22/2025 1825 by Joshua Cooper RN  Outcome: Progressing     Problem: ABCDS Injury Assessment  Goal: Absence of physical injury  6/22/2025 2211 by Savanah Espinoza RN  Outcome: Progressing  6/22/2025 1825 by Joshua Cooper RN  Outcome: Progressing     Problem: Safety - Adult  Goal: Free from fall injury  6/22/2025 2211 by Savanah Espinoza RN  Outcome: Progressing  6/22/2025 1825 by Joshua Cooper RN  Outcome: Progressing     Problem: Pain  Goal: Verbalizes/displays adequate comfort level or baseline comfort level  Outcome: Progressing

## 2025-06-23 NOTE — PROGRESS NOTES
CLINICAL PHARMACY NOTE: MEDS TO BEDS    Total # of Prescriptions Filled: 2   The following medications were delivered to the patient:  Eliquis 5 mg  Oxycodone 5/ 325 mg    Additional Documentation:

## 2025-06-23 NOTE — DISCHARGE SUMMARY
[facility] Hospitalist Physician Discharge Summary     Harris Marshall DO  [address]  [address]  [facility] [address]  [phone]    Call today      Harris Marshall DO  [address]  [address]  [facility] [address]  [phone]          Activity level: as tolerated  Dispo: home    Condition on discharge: stable    Patient ID:  Trinidad Weinberg, 50 y.o., 1975  [id number]    Admit date: 6/22/2025  Discharge date and time:  6/23/2025  3:20 PM    Admission Diagnoses: Principal Problem:    Acute pulmonary embolism without acute cor pulmonale (HCC)  Active Problems:    JOI (obstructive sleep apnea)    Acquired hypothyroidism    Fibromyalgia    Mild intermittent asthma without complication    Mixed hyperlipidemia    GERD (gastroesophageal reflux disease)    S/P gastric bypass    Essential hypertension    Diabetes mellitus without complication (HCC)    Acute pulmonary embolism without acute cor pulmonale, unspecified pulmonary embolism type (HCC)  Resolved Problems:    * No resolved hospital problems. *    Discharge Diagnoses: Principal Problem:    Acute pulmonary embolism without acute cor pulmonale (HCC)  Active Problems:    JOI (obstructive sleep apnea)    Acquired hypothyroidism    Fibromyalgia    Mild intermittent asthma without complication    Mixed hyperlipidemia    GERD (gastroesophageal reflux disease)    S/P gastric bypass    Essential hypertension    Diabetes mellitus without complication (HCC)    Acute pulmonary embolism without acute cor pulmonale, unspecified pulmonary embolism type (HCC)  Resolved Problems:    * No resolved hospital problems. *    Consults:  IP CONSULT TO SPIRITUAL SERVICES    Procedures:   ECHO 6/23    Left Ventricle: Normal left ventricular systolic function with a visually estimated EF of 55 - 60%. Left ventricle size is normal. Findings consistent with mild eccentric hypertrophy. Normal wall motion. Normal diastolic function.    Right Ventricle: Right

## 2025-06-23 NOTE — PLAN OF CARE
Problem: Chronic Conditions and Co-morbidities  Goal: Patient's chronic conditions and co-morbidity symptoms are monitored and maintained or improved  6/23/2025 1031 by Jennyfer Pérez RN  Outcome: Progressing     Problem: Discharge Planning  Goal: Discharge to home or other facility with appropriate resources  6/23/2025 1031 by Jennyfer Pérez RN  Outcome: Progressing     Problem: Skin/Tissue Integrity  Goal: Skin integrity remains intact  Description: 1.  Monitor for areas of redness and/or skin breakdown  2.  Assess vascular access sites hourly  3.  Every 4-6 hours minimum:  Change oxygen saturation probe site  4.  Every 4-6 hours:  If on nasal continuous positive airway pressure, respiratory therapy assess nares and determine need for appliance change or resting period  6/23/2025 1031 by Jennyfer Pérez RN  Outcome: Progressing

## 2025-06-23 NOTE — PROGRESS NOTES
Spiritual Health History and Assessment/Progress Note  [facility]    Initial Encounter, Attempted Encounter (Patient had been discharged.),  ,  ,      Name: Trinidad Weinberg MRN: [id number]    Age: 50 y.o.     Sex: female   Language: English   Quaker: Other   Acute pulmonary embolism without acute cor pulmonale (HCC)     Date: 6/23/2025                           Spiritual Assessment began in [facility] 4S INTERMEDIATE 1        Referral/Consult From: Rounding   Encounter Overview/Reason: Initial Encounter, Attempted Encounter (Patient had been discharged.)  Service Provided For: Patient    Perla, Belief, Meaning:   Patient unable to assess at this time  Family/Friends No family/friends present      Importance and Influence:  Patient unable to assess at this time  Family/Friends No family/friends present    Community:  Patient Other: Unable to assess  Family/Friends No family/friends present    Assessment and Plan of Care:     Patient Interventions include: Other: None  Family/Friends Interventions include: No family/friends present    Patient Plan of Care: No spiritual needs identified for follow-up  Family/Friends Plan of Care: No spiritual needs identified for follow-up    Electronically signed by Chaplain Nafisa on 6/23/2025 at 4:16 PM

## 2025-06-23 NOTE — CARE COORDINATION
CARE MANAGEMENT.... Patient being discharged. Per nursing, she is independent. Free 30 day and $10/month discount card was provided.

## 2025-06-24 ENCOUNTER — OFFICE VISIT (OUTPATIENT)
Dept: FAMILY MEDICINE CLINIC | Age: 50
End: 2025-06-24
Payer: OTHER GOVERNMENT

## 2025-06-24 ENCOUNTER — TELEPHONE (OUTPATIENT)
Dept: FAMILY MEDICINE CLINIC | Age: 50
End: 2025-06-24

## 2025-06-24 VITALS
HEART RATE: 64 BPM | OXYGEN SATURATION: 96 % | WEIGHT: 239 LBS | TEMPERATURE: 97.3 F | RESPIRATION RATE: 18 BRPM | BODY MASS INDEX: 46.92 KG/M2 | HEIGHT: 60 IN | SYSTOLIC BLOOD PRESSURE: 115 MMHG | DIASTOLIC BLOOD PRESSURE: 75 MMHG

## 2025-06-24 DIAGNOSIS — M54.6 ACUTE RIGHT-SIDED THORACIC BACK PAIN: Primary | ICD-10-CM

## 2025-06-24 DIAGNOSIS — I10 ESSENTIAL HYPERTENSION: ICD-10-CM

## 2025-06-24 DIAGNOSIS — Z76.0 MEDICATION REFILL: ICD-10-CM

## 2025-06-24 DIAGNOSIS — M79.631 RIGHT FOREARM PAIN: ICD-10-CM

## 2025-06-24 PROCEDURE — 3074F SYST BP LT 130 MM HG: CPT | Performed by: STUDENT IN AN ORGANIZED HEALTH CARE EDUCATION/TRAINING PROGRAM

## 2025-06-24 PROCEDURE — 3078F DIAST BP <80 MM HG: CPT | Performed by: STUDENT IN AN ORGANIZED HEALTH CARE EDUCATION/TRAINING PROGRAM

## 2025-06-24 PROCEDURE — 99214 OFFICE O/P EST MOD 30 MIN: CPT | Performed by: STUDENT IN AN ORGANIZED HEALTH CARE EDUCATION/TRAINING PROGRAM

## 2025-06-24 RX ORDER — AMLODIPINE BESYLATE 5 MG/1
5 TABLET ORAL DAILY
Qty: 90 TABLET | Refills: 1 | Status: SHIPPED | OUTPATIENT
Start: 2025-06-24

## 2025-06-24 RX ORDER — LOSARTAN POTASSIUM 100 MG/1
100 TABLET ORAL DAILY
Qty: 90 TABLET | Refills: 1 | Status: SHIPPED | OUTPATIENT
Start: 2025-06-24

## 2025-06-24 RX ORDER — LIDOCAINE 50 MG/G
1 PATCH TOPICAL DAILY
Qty: 10 PATCH | Refills: 0 | Status: SHIPPED | OUTPATIENT
Start: 2025-06-24 | End: 2025-07-04

## 2025-06-24 RX ORDER — LEVOTHYROXINE SODIUM 75 UG/1
75 TABLET ORAL DAILY
Qty: 90 TABLET | Refills: 1 | Status: SHIPPED | OUTPATIENT
Start: 2025-06-24 | End: 2025-12-21

## 2025-06-24 NOTE — PROGRESS NOTES
Diabetic Alb to Cr ratio (uACR) test  Never done    Hepatitis C screen  Never done    Hepatitis B vaccine (1 of 3 - 19+ 3-dose series) Never done    DTaP/Tdap/Td vaccine (1 - Tdap) Never done    Colorectal Cancer Screen  Never done    COVID-19 Vaccine (6 - 2024-25 season) 09/01/2024      Meds prior:  Current Outpatient Medications   Medication Instructions    amLODIPine (NORVASC) 5 mg, Oral, DAILY    apixaban (ELIQUIS) 5 MG TABS tablet Take 2 tablets by mouth 2 times daily for 7 days, THEN 1 tablet 2 times daily.    ASPIRIN LOW DOSE 81 MG EC tablet TAKE 1 TABLET BY MOUTH DAILY    docusate sodium (COLACE) 100 mg, DAILY    levothyroxine (SYNTHROID) 75 mcg, Oral, DAILY    lidocaine (LIDODERM) 5 % 1 patch, TransDERmal, DAILY, 12 hours on, 12 hours off.    losartan (COZAAR) 100 mg, Oral, DAILY    melatonin 10 mg, Nightly    Multiple Vitamin (MULTIVITAMIN, BARIATRIC FUSION COMPLETE, CHEW TAB) 4 tablets, 2 TIMES DAILY    nystatin (MYCOSTATIN) 460569 UNIT/GM cream APPLY TOPICALLY TWICE A DAY    ondansetron (ZOFRAN) 4 mg, PRN    oxyCODONE-acetaminophen (PERCOCET) 5-325 MG per tablet 1 tablet, Oral, EVERY 6 HOURS PRN    pantoprazole (PROTONIX) 40 mg, DAILY    polyethylene glycol (GLYCOLAX) 17 g, DAILY    rosuvastatin (CRESTOR) 10 mg, Oral, DAILY    vitamin B-12 (CYANOCOBALAMIN) 500 mcg, DAILY    vitamin D (CHOLECALCIFEROL) 5,000 Units      Physical Exam   Vitals: /75   Pulse 64   Temp 97.3 °F (36.3 °C)   Resp 18   Ht 1.524 m (5')   Wt 108.4 kg (239 lb)   LMP  (LMP Unknown)   SpO2 96%   BMI 46.68 kg/m²   General Appearance: Alert, oriented, no acute distress  HEENT: No scleral icterus. No visible discharge from eyes  Neck: Not rigid. No visible masses  Chest wall/Lung: Clear to auscultation bilaterally,  respirations unlabored. No ronchi/wheezing/rales  Heart: RRR, no murmur  Abdomen: Soft, nontender  Extremities:  No edema  Skin: No rashes. No jaundice  Neuro: Alert and oriented        Psych: Appropriate mood

## 2025-06-25 ENCOUNTER — TELEPHONE (OUTPATIENT)
Dept: FAMILY MEDICINE CLINIC | Age: 50
End: 2025-06-25

## 2025-07-07 NOTE — TELEPHONE ENCOUNTER
Name of Medication(s) Requested:  Requested Prescriptions     Pending Prescriptions Disp Refills    ondansetron (ZOFRAN) 4 MG tablet [Pharmacy Med Name: ONDANSETRON 4MG TABLETS] 30 tablet      Sig: TAKE 1 TABLET BY MOUTH EVERY 8 HOURS AS NEEDED FOR NAUSEA       Medication is on current medication list Yes    Dosage and directions were verified? Yes    Quantity verified: 30 day supply     Pharmacy Verified?  Yes    Last Appointment:  6/24/2025    Future appts:  No future appointments.     (If no appt send self scheduling link. .REFILLAPPT)  Scheduling request sent?     [] Yes  [] No    Does patient need updated?  [] Yes  [] No

## 2025-07-08 RX ORDER — ONDANSETRON 4 MG/1
4 TABLET, FILM COATED ORAL EVERY 8 HOURS PRN
Qty: 30 TABLET | Refills: 3 | Status: SHIPPED | OUTPATIENT
Start: 2025-07-08

## 2025-08-14 DIAGNOSIS — I25.118 CORONARY ARTERY DISEASE OF NATIVE ARTERY OF NATIVE HEART WITH STABLE ANGINA PECTORIS: ICD-10-CM

## 2025-08-14 RX ORDER — ROSUVASTATIN CALCIUM 10 MG/1
10 TABLET, COATED ORAL DAILY
Qty: 90 TABLET | Refills: 3 | Status: SHIPPED | OUTPATIENT
Start: 2025-08-14 | End: 2026-08-09

## (undated) DEVICE — DRAPE,REIN 53X77,STERILE: Brand: MEDLINE

## (undated) DEVICE — 4-PORT MANIFOLD: Brand: NEPTUNE 2

## (undated) DEVICE — CUP MEDICINE ST

## (undated) DEVICE — NEEDLE FLTR 18GA L1.5IN MEM THK5UM BLNT DISP

## (undated) DEVICE — SET FLD COLL CLR W/ BLT IN WARN SYS FOR HYSTSCP DOLPHIN

## (undated) DEVICE — GAUZE,SPONGE,4"X4",8PLY,STRL,LF,10/TRAY: Brand: MEDLINE

## (undated) DEVICE — SYRINGE, LUER LOCK, 10ML: Brand: MEDLINE

## (undated) DEVICE — SOLUTION IV IRRIG POUR BRL 0.9% SODIUM CHL 2F7124

## (undated) DEVICE — Device

## (undated) DEVICE — GLOVE ORANGE PI 7 1/2   MSG9075

## (undated) DEVICE — PAD,SANITARY,11 IN,MAXI,N-STRL,IND WRAP: Brand: MEDLINE

## (undated) DEVICE — INJECTOR: Brand: INJECTOR

## (undated) DEVICE — Z INACTIVE USE 2635503 SOLUTION IRRIG 3000ML ST H2O USP UROMATIC PLAS CONT

## (undated) DEVICE — SET CYSTOSCOPE 21FR

## (undated) DEVICE — TRAY,VAG PREP,2PR VNYL GLV,4 C: Brand: MEDLINE INDUSTRIES, INC.

## (undated) DEVICE — GOWN,SIRUS,POLYRNF,BRTHSLV,XLN/XL,20/CS: Brand: MEDLINE

## (undated) DEVICE — Y-TYPE TUR/BLADDER IRRIGATION SET, REGULATING CLAMP

## (undated) DEVICE — LEGGINGS, PAIR, 33X51 XL, STERILE: Brand: MEDLINE

## (undated) DEVICE — GOWN,SIRUS,FABRNF,XL,20/CS: Brand: MEDLINE

## (undated) DEVICE — CAMERA STRYKER 1488

## (undated) DEVICE — LENS CYSTOSCOPE 30 DEG